# Patient Record
Sex: FEMALE | Race: WHITE | Employment: FULL TIME | URBAN - METROPOLITAN AREA
[De-identification: names, ages, dates, MRNs, and addresses within clinical notes are randomized per-mention and may not be internally consistent; named-entity substitution may affect disease eponyms.]

---

## 2017-01-04 ENCOUNTER — OFFICE VISIT (OUTPATIENT)
Dept: FAMILY MEDICINE CLINIC | Age: 57
End: 2017-01-04

## 2017-01-04 VITALS
HEART RATE: 85 BPM | RESPIRATION RATE: 17 BRPM | DIASTOLIC BLOOD PRESSURE: 76 MMHG | WEIGHT: 216 LBS | BODY MASS INDEX: 36.88 KG/M2 | SYSTOLIC BLOOD PRESSURE: 118 MMHG | HEIGHT: 64 IN | OXYGEN SATURATION: 97 %

## 2017-01-04 DIAGNOSIS — M79.604 CHRONIC PAIN OF BOTH LOWER EXTREMITIES: ICD-10-CM

## 2017-01-04 DIAGNOSIS — S52.125D CLOSED NONDISPLACED FRACTURE OF HEAD OF LEFT RADIUS WITH ROUTINE HEALING, SUBSEQUENT ENCOUNTER: Primary | ICD-10-CM

## 2017-01-04 DIAGNOSIS — G89.29 CHRONIC PAIN OF BOTH LOWER EXTREMITIES: ICD-10-CM

## 2017-01-04 DIAGNOSIS — M79.605 CHRONIC PAIN OF BOTH LOWER EXTREMITIES: ICD-10-CM

## 2017-01-04 DIAGNOSIS — F41.9 ANXIETY: ICD-10-CM

## 2017-01-04 PROCEDURE — 99213 OFFICE O/P EST LOW 20 MIN: CPT | Performed by: FAMILY MEDICINE

## 2017-01-04 RX ORDER — FLUOXETINE HYDROCHLORIDE 20 MG/1
20 CAPSULE ORAL DAILY
Qty: 30 CAPSULE | Refills: 5 | Status: SHIPPED | OUTPATIENT
Start: 2017-01-04 | End: 2017-03-20

## 2017-01-04 RX ORDER — TIZANIDINE 4 MG/1
4 TABLET ORAL NIGHTLY PRN
Qty: 30 TABLET | Refills: 0 | Status: SHIPPED | OUTPATIENT
Start: 2017-01-04 | End: 2017-02-03 | Stop reason: ALTCHOICE

## 2017-01-04 RX ORDER — ALPRAZOLAM 0.5 MG/1
0.5 TABLET ORAL NIGHTLY PRN
Qty: 30 TABLET | Refills: 0 | Status: SHIPPED | OUTPATIENT
Start: 2017-01-04 | End: 2017-02-15 | Stop reason: SDUPTHER

## 2017-01-04 RX ORDER — HYDROCODONE BITARTRATE AND ACETAMINOPHEN 7.5; 325 MG/1; MG/1
1 TABLET ORAL EVERY 6 HOURS PRN
Qty: 120 TABLET | Refills: 0 | Status: SHIPPED | OUTPATIENT
Start: 2017-01-04 | End: 2017-02-03 | Stop reason: SDUPTHER

## 2017-01-04 RX ORDER — HYDROCODONE BITARTRATE AND ACETAMINOPHEN 7.5; 325 MG/1; MG/1
TABLET ORAL
Qty: 120 TABLET | Refills: 0 | Status: SHIPPED | OUTPATIENT
Start: 2017-01-04 | End: 2017-03-20 | Stop reason: SDUPTHER

## 2017-01-24 ENCOUNTER — HOSPITAL ENCOUNTER (OUTPATIENT)
Dept: OTHER | Age: 57
Discharge: HOME | End: 2017-01-24
Attending: FAMILY MEDICINE | Admitting: FAMILY MEDICINE

## 2017-01-24 DIAGNOSIS — S52.125D CLOSED NONDISPLACED FRACTURE OF HEAD OF LEFT RADIUS WITH ROUTINE HEALING, SUBSEQUENT ENCOUNTER: ICD-10-CM

## 2017-01-30 RX ORDER — TIZANIDINE 4 MG/1
TABLET ORAL
Qty: 30 TABLET | Refills: 0 | OUTPATIENT
Start: 2017-01-30

## 2017-02-02 ENCOUNTER — TELEPHONE (OUTPATIENT)
Dept: FAMILY MEDICINE CLINIC | Age: 57
End: 2017-02-02

## 2017-02-03 ENCOUNTER — OFFICE VISIT (OUTPATIENT)
Dept: FAMILY MEDICINE CLINIC | Age: 57
End: 2017-02-03

## 2017-02-03 VITALS
WEIGHT: 204.4 LBS | TEMPERATURE: 98.3 F | DIASTOLIC BLOOD PRESSURE: 80 MMHG | HEIGHT: 64 IN | BODY MASS INDEX: 34.89 KG/M2 | SYSTOLIC BLOOD PRESSURE: 124 MMHG | HEART RATE: 92 BPM | OXYGEN SATURATION: 98 % | RESPIRATION RATE: 15 BRPM

## 2017-02-03 DIAGNOSIS — I49.9 IRREGULAR CARDIAC RHYTHM: ICD-10-CM

## 2017-02-03 DIAGNOSIS — R00.2 HEART PALPITATIONS: Primary | ICD-10-CM

## 2017-02-03 PROCEDURE — 99213 OFFICE O/P EST LOW 20 MIN: CPT | Performed by: NURSE PRACTITIONER

## 2017-02-03 PROCEDURE — 93000 ELECTROCARDIOGRAM COMPLETE: CPT | Performed by: NURSE PRACTITIONER

## 2017-02-06 ENCOUNTER — HOSPITAL ENCOUNTER (OUTPATIENT)
Dept: NON INVASIVE DIAGNOSTICS | Age: 57
Discharge: HOME | End: 2017-02-06
Attending: NURSE PRACTITIONER | Admitting: NURSE PRACTITIONER

## 2017-02-06 DIAGNOSIS — R00.2 PALPITATIONS: ICD-10-CM

## 2017-02-10 ENCOUNTER — TELEPHONE (OUTPATIENT)
Dept: FAMILY MEDICINE CLINIC | Age: 57
End: 2017-02-10

## 2017-02-10 LAB
ACQUISITION DURATION: NORMAL S
AVERAGE HEART RATE: 73 BPM
EKG DIAGNOSIS: NORMAL
FASTEST SUPRAVENTRICULAR RATE: 132 BPM
HOLTER MAX HEART RATE: 129 BPM
HOOKUP DATE: NORMAL
HOOKUP TIME: NORMAL
LONGEST SUPRAVENTRICULAR RATE: 106 BPM
Lab: NORMAL
MAX HEART RATE TIME/DATE: NORMAL
MIN HEART RATE TIME/DATE: NORMAL
MIN HEART RATE: 45 BPM
NUMBER OF FASTEST SUPRAVENTRICULAR BEATS: 5
NUMBER OF LONGEST SUPRAVENTRICULAR BEATS: 14
NUMBER OF QRS COMPLEXES: NORMAL
NUMBER OF SUPRAVENTRICULAR BEATS IN RUNS: 121
NUMBER OF SUPRAVENTRICULAR COUPLETS: 35
NUMBER OF SUPRAVENTRICULAR ECTOPICS: 289
NUMBER OF SUPRAVENTRICULAR ISOLATED BEATS: 98
NUMBER OF SUPRAVENTRICULAR RUNS: 28
NUMBER OF VENTRICULAR BEATS IN RUNS: 0
NUMBER OF VENTRICULAR BIGEMINAL CYCLES: 6155
NUMBER OF VENTRICULAR COUPLETS: 0
NUMBER OF VENTRICULAR ECTOPICS: 6262
NUMBER OF VENTRICULAR ISOLATED BEATS: 6262
NUMBER OF VENTRICULAR RUNS: 0

## 2017-02-15 ENCOUNTER — OFFICE VISIT (OUTPATIENT)
Dept: FAMILY MEDICINE CLINIC | Age: 57
End: 2017-02-15

## 2017-02-15 VITALS
HEART RATE: 50 BPM | RESPIRATION RATE: 16 BRPM | OXYGEN SATURATION: 99 % | DIASTOLIC BLOOD PRESSURE: 72 MMHG | HEIGHT: 64 IN | SYSTOLIC BLOOD PRESSURE: 112 MMHG | BODY MASS INDEX: 34.15 KG/M2 | WEIGHT: 200 LBS

## 2017-02-15 DIAGNOSIS — I49.3 PVC (PREMATURE VENTRICULAR CONTRACTION): ICD-10-CM

## 2017-02-15 DIAGNOSIS — F41.9 ANXIETY: Primary | ICD-10-CM

## 2017-02-15 PROCEDURE — 99213 OFFICE O/P EST LOW 20 MIN: CPT | Performed by: FAMILY MEDICINE

## 2017-02-15 RX ORDER — ALPRAZOLAM 0.5 MG/1
0.5 TABLET ORAL NIGHTLY PRN
Qty: 30 TABLET | Refills: 2 | Status: SHIPPED | OUTPATIENT
Start: 2017-02-15 | End: 2017-05-25 | Stop reason: SDUPTHER

## 2017-02-15 RX ORDER — VENLAFAXINE HYDROCHLORIDE 75 MG/1
CAPSULE, EXTENDED RELEASE ORAL
Qty: 30 CAPSULE | Refills: 0 | Status: SHIPPED | OUTPATIENT
Start: 2017-02-15 | End: 2017-03-02 | Stop reason: SDUPTHER

## 2017-02-15 RX ORDER — VENLAFAXINE HYDROCHLORIDE 150 MG/1
150 CAPSULE, EXTENDED RELEASE ORAL DAILY
Qty: 30 CAPSULE | Refills: 5 | Status: SHIPPED | OUTPATIENT
Start: 2017-02-15 | End: 2017-10-30 | Stop reason: SDUPTHER

## 2017-03-02 RX ORDER — VENLAFAXINE HYDROCHLORIDE 150 MG/1
CAPSULE, EXTENDED RELEASE ORAL
Qty: 30 CAPSULE | Refills: 5 | Status: SHIPPED | OUTPATIENT
Start: 2017-03-02 | End: 2017-06-19 | Stop reason: SDUPTHER

## 2017-03-20 ENCOUNTER — OFFICE VISIT (OUTPATIENT)
Dept: FAMILY MEDICINE CLINIC | Age: 57
End: 2017-03-20

## 2017-03-20 VITALS
WEIGHT: 191 LBS | RESPIRATION RATE: 14 BRPM | OXYGEN SATURATION: 98 % | HEART RATE: 90 BPM | SYSTOLIC BLOOD PRESSURE: 120 MMHG | HEIGHT: 64 IN | BODY MASS INDEX: 32.61 KG/M2 | DIASTOLIC BLOOD PRESSURE: 68 MMHG

## 2017-03-20 DIAGNOSIS — G89.29 CHRONIC PAIN OF BOTH LOWER EXTREMITIES: Primary | ICD-10-CM

## 2017-03-20 DIAGNOSIS — K59.03 DRUG-INDUCED CONSTIPATION: ICD-10-CM

## 2017-03-20 DIAGNOSIS — Z79.899 LONG TERM USE OF DRUG: ICD-10-CM

## 2017-03-20 DIAGNOSIS — I49.3 PVC (PREMATURE VENTRICULAR CONTRACTION): ICD-10-CM

## 2017-03-20 DIAGNOSIS — R73.03 PREDIABETES: ICD-10-CM

## 2017-03-20 DIAGNOSIS — R73.9 HYPERGLYCEMIA: ICD-10-CM

## 2017-03-20 DIAGNOSIS — Z12.11 COLON CANCER SCREENING: ICD-10-CM

## 2017-03-20 DIAGNOSIS — M79.605 CHRONIC PAIN OF BOTH LOWER EXTREMITIES: Primary | ICD-10-CM

## 2017-03-20 DIAGNOSIS — F41.9 ANXIETY: ICD-10-CM

## 2017-03-20 DIAGNOSIS — M79.604 CHRONIC PAIN OF BOTH LOWER EXTREMITIES: Primary | ICD-10-CM

## 2017-03-20 LAB
AMPHETAMINE SCREEN, URINE: NORMAL
BARBITURATE SCREEN, URINE: NORMAL
BENZODIAZEPINE SCREEN, URINE: NORMAL
COCAINE METABOLITE SCREEN URINE: NORMAL
HBA1C MFR BLD: 6 %
MDMA URINE: NORMAL
METHADONE SCREEN, URINE: NORMAL
METHAMPHETAMINE, URINE: NORMAL
OPIATE SCREEN URINE: NORMAL
OXYCODONE SCREEN URINE: NORMAL
PHENCYCLIDINE SCREEN URINE: NORMAL
PROPOXYPHENE SCREEN, URINE: NORMAL
THC: NORMAL
TRICYCLIC ANTIDEPRESSANTS, UR: NORMAL

## 2017-03-20 PROCEDURE — 83036 HEMOGLOBIN GLYCOSYLATED A1C: CPT | Performed by: FAMILY MEDICINE

## 2017-03-20 PROCEDURE — 99214 OFFICE O/P EST MOD 30 MIN: CPT | Performed by: FAMILY MEDICINE

## 2017-03-20 PROCEDURE — 80305 DRUG TEST PRSMV DIR OPT OBS: CPT | Performed by: FAMILY MEDICINE

## 2017-03-20 RX ORDER — HYDROCODONE BITARTRATE AND ACETAMINOPHEN 7.5; 325 MG/1; MG/1
TABLET ORAL
Qty: 90 TABLET | Refills: 0 | Status: SHIPPED | OUTPATIENT
Start: 2017-03-20 | End: 2017-04-19 | Stop reason: SDUPTHER

## 2017-04-19 ENCOUNTER — PATIENT MESSAGE (OUTPATIENT)
Dept: FAMILY MEDICINE CLINIC | Age: 57
End: 2017-04-19

## 2017-04-19 RX ORDER — HYDROCODONE BITARTRATE AND ACETAMINOPHEN 7.5; 325 MG/1; MG/1
TABLET ORAL
Qty: 90 TABLET | Refills: 0 | Status: SHIPPED | OUTPATIENT
Start: 2017-04-19 | End: 2017-05-16 | Stop reason: SDUPTHER

## 2017-05-15 RX ORDER — HYDROCODONE BITARTRATE AND ACETAMINOPHEN 7.5; 325 MG/1; MG/1
TABLET ORAL
Qty: 90 TABLET | Refills: 0 | Status: CANCELLED | OUTPATIENT
Start: 2017-05-15

## 2017-05-16 ENCOUNTER — PATIENT MESSAGE (OUTPATIENT)
Dept: FAMILY MEDICINE CLINIC | Age: 57
End: 2017-05-16

## 2017-05-16 RX ORDER — HYDROCODONE BITARTRATE AND ACETAMINOPHEN 7.5; 325 MG/1; MG/1
TABLET ORAL
Qty: 90 TABLET | Refills: 0 | Status: SHIPPED | OUTPATIENT
Start: 2017-05-16 | End: 2017-05-16 | Stop reason: SDUPTHER

## 2017-05-16 RX ORDER — HYDROCODONE BITARTRATE AND ACETAMINOPHEN 7.5; 325 MG/1; MG/1
TABLET ORAL
Qty: 90 TABLET | Refills: 0 | Status: SHIPPED | OUTPATIENT
Start: 2017-05-16 | End: 2017-06-19 | Stop reason: SDUPTHER

## 2017-05-25 RX ORDER — ALPRAZOLAM 0.5 MG/1
TABLET ORAL
Qty: 30 TABLET | Refills: 1 | Status: SHIPPED | OUTPATIENT
Start: 2017-05-25 | End: 2017-07-31 | Stop reason: SDUPTHER

## 2017-06-19 ENCOUNTER — OFFICE VISIT (OUTPATIENT)
Dept: FAMILY MEDICINE CLINIC | Age: 57
End: 2017-06-19

## 2017-06-19 VITALS
BODY MASS INDEX: 29.53 KG/M2 | SYSTOLIC BLOOD PRESSURE: 118 MMHG | WEIGHT: 173 LBS | OXYGEN SATURATION: 98 % | RESPIRATION RATE: 10 BRPM | HEART RATE: 54 BPM | DIASTOLIC BLOOD PRESSURE: 74 MMHG | HEIGHT: 64 IN

## 2017-06-19 DIAGNOSIS — F32.5 MAJOR DEPRESSION IN REMISSION (HCC): ICD-10-CM

## 2017-06-19 DIAGNOSIS — G89.29 CHRONIC PAIN OF BOTH LOWER EXTREMITIES: ICD-10-CM

## 2017-06-19 DIAGNOSIS — R00.2 PALPITATIONS: ICD-10-CM

## 2017-06-19 DIAGNOSIS — M25.522 LEFT ELBOW PAIN: Primary | ICD-10-CM

## 2017-06-19 DIAGNOSIS — M79.605 CHRONIC PAIN OF BOTH LOWER EXTREMITIES: ICD-10-CM

## 2017-06-19 DIAGNOSIS — M79.604 CHRONIC PAIN OF BOTH LOWER EXTREMITIES: ICD-10-CM

## 2017-06-19 DIAGNOSIS — R73.9 HYPERGLYCEMIA: ICD-10-CM

## 2017-06-19 LAB — HBA1C MFR BLD: 5.9 %

## 2017-06-19 PROCEDURE — 83036 HEMOGLOBIN GLYCOSYLATED A1C: CPT | Performed by: FAMILY MEDICINE

## 2017-06-19 PROCEDURE — 99213 OFFICE O/P EST LOW 20 MIN: CPT | Performed by: FAMILY MEDICINE

## 2017-06-19 RX ORDER — VENLAFAXINE HYDROCHLORIDE 150 MG/1
CAPSULE, EXTENDED RELEASE ORAL
Qty: 30 CAPSULE | Refills: 5 | Status: SHIPPED | OUTPATIENT
Start: 2017-06-19 | End: 2017-10-30 | Stop reason: ALTCHOICE

## 2017-06-19 RX ORDER — HYDROCODONE BITARTRATE AND ACETAMINOPHEN 7.5; 325 MG/1; MG/1
TABLET ORAL
Qty: 270 TABLET | Refills: 0 | Status: SHIPPED | OUTPATIENT
Start: 2017-06-19 | End: 2017-07-17 | Stop reason: SDUPTHER

## 2017-06-19 ASSESSMENT — PATIENT HEALTH QUESTIONNAIRE - PHQ9
1. LITTLE INTEREST OR PLEASURE IN DOING THINGS: 0
2. FEELING DOWN, DEPRESSED OR HOPELESS: 0
SUM OF ALL RESPONSES TO PHQ QUESTIONS 1-9: 0
SUM OF ALL RESPONSES TO PHQ9 QUESTIONS 1 & 2: 0

## 2017-07-05 ENCOUNTER — OFFICE VISIT (OUTPATIENT)
Dept: ORTHOPEDIC SURGERY | Age: 57
End: 2017-07-05

## 2017-07-05 VITALS
HEIGHT: 63 IN | HEART RATE: 55 BPM | DIASTOLIC BLOOD PRESSURE: 78 MMHG | WEIGHT: 170 LBS | SYSTOLIC BLOOD PRESSURE: 125 MMHG | BODY MASS INDEX: 30.12 KG/M2

## 2017-07-05 DIAGNOSIS — S52.125S CLOSED NONDISPLACED FRACTURE OF HEAD OF LEFT RADIUS, SEQUELA: Primary | ICD-10-CM

## 2017-07-05 PROCEDURE — 73080 X-RAY EXAM OF ELBOW: CPT | Performed by: ORTHOPAEDIC SURGERY

## 2017-07-05 PROCEDURE — 99243 OFF/OP CNSLTJ NEW/EST LOW 30: CPT | Performed by: ORTHOPAEDIC SURGERY

## 2017-07-17 ENCOUNTER — TELEPHONE (OUTPATIENT)
Dept: FAMILY MEDICINE CLINIC | Age: 57
End: 2017-07-17

## 2017-07-17 RX ORDER — HYDROCODONE BITARTRATE AND ACETAMINOPHEN 7.5; 325 MG/1; MG/1
TABLET ORAL
Qty: 270 TABLET | Refills: 0 | Status: CANCELLED | OUTPATIENT
Start: 2017-07-17

## 2017-07-17 RX ORDER — HYDROCODONE BITARTRATE AND ACETAMINOPHEN 7.5; 325 MG/1; MG/1
TABLET ORAL
Qty: 90 TABLET | Refills: 0 | Status: SHIPPED | OUTPATIENT
Start: 2017-07-17 | End: 2017-08-16 | Stop reason: SDUPTHER

## 2017-08-16 ENCOUNTER — PATIENT MESSAGE (OUTPATIENT)
Dept: FAMILY MEDICINE CLINIC | Age: 57
End: 2017-08-16

## 2017-08-16 RX ORDER — HYDROCODONE BITARTRATE AND ACETAMINOPHEN 7.5; 325 MG/1; MG/1
TABLET ORAL
Qty: 90 TABLET | Refills: 0 | Status: SHIPPED | OUTPATIENT
Start: 2017-08-16 | End: 2017-09-14 | Stop reason: SDUPTHER

## 2017-08-16 RX ORDER — ATORVASTATIN CALCIUM 40 MG/1
TABLET, FILM COATED ORAL
Qty: 30 TABLET | Refills: 11 | Status: SHIPPED | OUTPATIENT
Start: 2017-08-16 | End: 2017-10-30 | Stop reason: SDUPTHER

## 2017-08-16 RX ORDER — HYDROCODONE BITARTRATE AND ACETAMINOPHEN 7.5; 325 MG/1; MG/1
TABLET ORAL
Qty: 90 TABLET | Refills: 0 | OUTPATIENT
Start: 2017-08-16

## 2017-09-14 RX ORDER — HYDROCODONE BITARTRATE AND ACETAMINOPHEN 7.5; 325 MG/1; MG/1
TABLET ORAL
Qty: 90 TABLET | Refills: 0 | Status: SHIPPED | OUTPATIENT
Start: 2017-09-14 | End: 2017-10-13 | Stop reason: SDUPTHER

## 2017-10-12 ENCOUNTER — PATIENT MESSAGE (OUTPATIENT)
Dept: FAMILY MEDICINE CLINIC | Age: 57
End: 2017-10-12

## 2017-10-12 RX ORDER — HYDROCODONE BITARTRATE AND ACETAMINOPHEN 7.5; 325 MG/1; MG/1
TABLET ORAL
Qty: 90 TABLET | Refills: 0 | Status: CANCELLED | OUTPATIENT
Start: 2017-10-12

## 2017-10-12 RX ORDER — HYDROCODONE BITARTRATE AND ACETAMINOPHEN 7.5; 325 MG/1; MG/1
TABLET ORAL
Qty: 90 TABLET | Refills: 0 | OUTPATIENT
Start: 2017-10-12

## 2017-10-13 RX ORDER — HYDROCODONE BITARTRATE AND ACETAMINOPHEN 7.5; 325 MG/1; MG/1
TABLET ORAL
Qty: 90 TABLET | Refills: 0 | Status: SHIPPED | OUTPATIENT
Start: 2017-10-13 | End: 2017-10-31 | Stop reason: SDUPTHER

## 2017-10-13 NOTE — TELEPHONE ENCOUNTER
From: Michoacano Lazaro  To: Ludy Friend MD  Sent: 10/12/2017 7:50 PM EDT  Subject: Prescription Question    I have a appointment for October 30 for my physical and need a refill on my pain medication before my appointment.

## 2017-10-13 NOTE — TELEPHONE ENCOUNTER
Pt sent in two request for the same medication. One was denied and the other is still pending. Are we going to fill this for pt? .  Please call her

## 2017-10-30 ENCOUNTER — HOSPITAL ENCOUNTER (OUTPATIENT)
Dept: WOMENS IMAGING | Age: 57
Discharge: HOME | End: 2017-10-30
Attending: FAMILY MEDICINE | Admitting: FAMILY MEDICINE

## 2017-10-30 ENCOUNTER — OFFICE VISIT (OUTPATIENT)
Dept: FAMILY MEDICINE CLINIC | Age: 57
End: 2017-10-30

## 2017-10-30 VITALS
HEIGHT: 63 IN | BODY MASS INDEX: 30.12 KG/M2 | RESPIRATION RATE: 16 BRPM | TEMPERATURE: 98 F | DIASTOLIC BLOOD PRESSURE: 70 MMHG | WEIGHT: 170 LBS | HEART RATE: 56 BPM | SYSTOLIC BLOOD PRESSURE: 118 MMHG

## 2017-10-30 DIAGNOSIS — Z12.11 COLON CANCER SCREENING: ICD-10-CM

## 2017-10-30 DIAGNOSIS — M79.604 PAIN IN BOTH LOWER EXTREMITIES: ICD-10-CM

## 2017-10-30 DIAGNOSIS — M79.605 PAIN IN BOTH LOWER EXTREMITIES: ICD-10-CM

## 2017-10-30 DIAGNOSIS — E78.00 PURE HYPERCHOLESTEROLEMIA: ICD-10-CM

## 2017-10-30 DIAGNOSIS — Z23 NEED FOR INFLUENZA VACCINATION: ICD-10-CM

## 2017-10-30 DIAGNOSIS — E11.9 TYPE 2 DIABETES MELLITUS WITHOUT COMPLICATION, WITHOUT LONG-TERM CURRENT USE OF INSULIN (HCC): ICD-10-CM

## 2017-10-30 DIAGNOSIS — Z00.00 WELLNESS EXAMINATION: Primary | ICD-10-CM

## 2017-10-30 DIAGNOSIS — Z12.31 VISIT FOR SCREENING MAMMOGRAM: ICD-10-CM

## 2017-10-30 DIAGNOSIS — R39.9 UTI SYMPTOMS: ICD-10-CM

## 2017-10-30 DIAGNOSIS — F33.0 MILD EPISODE OF RECURRENT MAJOR DEPRESSIVE DISORDER (HCC): ICD-10-CM

## 2017-10-30 DIAGNOSIS — Z23 NEED FOR TDAP VACCINATION: ICD-10-CM

## 2017-10-30 LAB
A/G RATIO: 2 (ref 1.1–2.2)
ALBUMIN SERPL-MCNC: 4.3 G/DL (ref 3.4–5)
ALP BLD-CCNC: 65 U/L (ref 40–129)
ALT SERPL-CCNC: 39 U/L (ref 10–40)
ANION GAP SERPL CALCULATED.3IONS-SCNC: 12 MMOL/L (ref 3–16)
AST SERPL-CCNC: 21 U/L (ref 15–37)
BILIRUB SERPL-MCNC: 0.3 MG/DL (ref 0–1)
BILIRUBIN, POC: ABNORMAL
BLOOD URINE, POC: ABNORMAL
BUN BLDV-MCNC: 17 MG/DL (ref 7–20)
CALCIUM SERPL-MCNC: 9.5 MG/DL (ref 8.3–10.6)
CHLORIDE BLD-SCNC: 102 MMOL/L (ref 99–110)
CHOLESTEROL, TOTAL: 147 MG/DL (ref 0–199)
CLARITY, POC: CLEAR
CO2: 31 MMOL/L (ref 21–32)
COLOR, POC: YELLOW
CREAT SERPL-MCNC: 0.6 MG/DL (ref 0.6–1.1)
CREATININE URINE: 212.7 MG/DL (ref 28–259)
GFR AFRICAN AMERICAN: >60
GFR NON-AFRICAN AMERICAN: >60
GLOBULIN: 2.2 G/DL
GLUCOSE BLD-MCNC: 100 MG/DL (ref 70–99)
GLUCOSE URINE, POC: ABNORMAL
HBA1C MFR BLD: 5.7 %
HDLC SERPL-MCNC: 54 MG/DL (ref 40–60)
KETONES, POC: ABNORMAL
LDL CHOLESTEROL CALCULATED: 82 MG/DL
LEUKOCYTE EST, POC: ABNORMAL
MICROALBUMIN UR-MCNC: <1.2 MG/DL
MICROALBUMIN/CREAT UR-RTO: NORMAL MG/G (ref 0–30)
NITRITE, POC: ABNORMAL
PH, POC: 7
POTASSIUM SERPL-SCNC: 4.6 MMOL/L (ref 3.5–5.1)
PROTEIN, POC: ABNORMAL
SODIUM BLD-SCNC: 145 MMOL/L (ref 136–145)
SPECIFIC GRAVITY, POC: 1.02
TOTAL PROTEIN: 6.5 G/DL (ref 6.4–8.2)
TRIGL SERPL-MCNC: 53 MG/DL (ref 0–150)
UROBILINOGEN, POC: 0.2
VITAMIN D 25-HYDROXY: 25.5 NG/ML
VLDLC SERPL CALC-MCNC: 11 MG/DL

## 2017-10-30 PROCEDURE — 99396 PREV VISIT EST AGE 40-64: CPT | Performed by: REGISTERED NURSE

## 2017-10-30 PROCEDURE — 90630 INFLUENZA, QUADV, 18-64 YRS, ID, PF, MICRO INJ, 0.1ML (FLUZONE QUADV, PF): CPT | Performed by: REGISTERED NURSE

## 2017-10-30 PROCEDURE — 36415 COLL VENOUS BLD VENIPUNCTURE: CPT | Performed by: REGISTERED NURSE

## 2017-10-30 PROCEDURE — 83036 HEMOGLOBIN GLYCOSYLATED A1C: CPT | Performed by: REGISTERED NURSE

## 2017-10-30 PROCEDURE — 90715 TDAP VACCINE 7 YRS/> IM: CPT | Performed by: REGISTERED NURSE

## 2017-10-30 PROCEDURE — 90472 IMMUNIZATION ADMIN EACH ADD: CPT | Performed by: REGISTERED NURSE

## 2017-10-30 PROCEDURE — 81002 URINALYSIS NONAUTO W/O SCOPE: CPT | Performed by: REGISTERED NURSE

## 2017-10-30 PROCEDURE — 90471 IMMUNIZATION ADMIN: CPT | Performed by: REGISTERED NURSE

## 2017-10-30 RX ORDER — HYDROCODONE BITARTRATE AND ACETAMINOPHEN 7.5; 325 MG/1; MG/1
TABLET ORAL
Qty: 90 TABLET | Refills: 0 | Status: CANCELLED | OUTPATIENT
Start: 2017-10-30

## 2017-10-30 RX ORDER — NITROFURANTOIN 25; 75 MG/1; MG/1
100 CAPSULE ORAL 2 TIMES DAILY
Qty: 14 CAPSULE | Refills: 0 | Status: SHIPPED | OUTPATIENT
Start: 2017-10-30 | End: 2017-11-06

## 2017-10-30 RX ORDER — ATORVASTATIN CALCIUM 40 MG/1
TABLET, FILM COATED ORAL
Qty: 30 TABLET | Refills: 11 | Status: SHIPPED | OUTPATIENT
Start: 2017-10-30 | End: 2019-01-23 | Stop reason: SDUPTHER

## 2017-10-30 RX ORDER — VENLAFAXINE HYDROCHLORIDE 150 MG/1
150 CAPSULE, EXTENDED RELEASE ORAL DAILY
Qty: 30 CAPSULE | Refills: 5 | Status: SHIPPED | OUTPATIENT
Start: 2017-10-30 | End: 2018-02-12 | Stop reason: SDUPTHER

## 2017-10-30 NOTE — LETTER
MEDICATION AGREEMENT     Olamide Woo  [birth year]      For certain conditions, multiple classes of medications may be used to help better manage your symptoms, and to improve your ability to function at home, work and in social settings. However, these medications do have risks, which will be discussed with you, including addiction and dependency. The following prescribed medications need frequent monitoring and will require you to partner and assist in your healthcare. Medication  Dose, instructions and quantity as indicated on current prescription bottle Diagnosis/Reason(s) for Taking Category   NORCO 7.5-325MG Q8H PRN #90   PAIN     XANAX 0.5 MG NIGHTLY PRN #30   ANXIETY                      Benefits and goals of Controlled Substance Medications: There are two potential goals for your treatment: (1) decreased pain and suffering (2) improved daily life functions. There are many possible treatments for your chronic condition(s), and, in addition to controlled substance medications, we will try alternatives such as physical therapy, yoga, massage, home daily exercise, meditation, relaxation techniques, injections, chiropractic manipulations, surgery, cognitive therapy, hypnosis and many medications that are not habit-forming. Use of controlled substance medications may be helpful, but they are unlikely to resolve all of your symptoms or restore all function. Risks of Controlled Substance Medications:    Opioid pain medications: These medications can lead to problems such as addiction/dependence, sedation, lightheadedness/dizziness, memory issues, falls, constipation, nausea, or vomiting. They may also impair the ability to drive or operate machinery. Additionally, these medications may lower testosterone levels, leading to loss of bone strength, stamina and sex drive.   They may cause problems with breathing, sleep apnea and reduced coughing, which are especially dangerous for patients with lung which may also result in my being prevented from receiving further care from this office. · Other:____________________________________________________________________    AGREEMENT:    I have read the above and have had all of my questions answered. For chronic disease management, I know that my symptoms can be managed with many types of treatments. A chronic medication trial may be part of my treatment, but I must be an active participant in my care. Medication therapy is only one part of my symptom management plan. In some cases, there may be limited scientific evidence to support the chronic use of certain medications to improve symptoms and daily function. Furthermore, in certain circumstances, there may be scientific information that suggests that use of chronic controlled substances may actually worsen my symptoms and increase my risk of unintentional death directly related to this medication therapy. I know that if my provider feels my risk from controlled medications is greater than my benefit, I will have my controlled substance medication(s) compassionately lowered or removed altogether. I agree to a controlled substance medication trial.      I further agree to allow this office to contact family or friends if there are concerns about my safety and use of the controlled medications. I have agreed to use the following medications above as instructed by my physician and as stated in this [id number].      Patient Signature:  ______________________  Date:10/30/2017 or _____________    Provider Signature:______________________  Date:10/30/2017 or _____________

## 2017-10-30 NOTE — PROGRESS NOTES
Otilio Sprague  YOB: 1960    Date of Service:  10/30/2017    Chief Complaint:   Otilio Sprague is a 62 y.o. female who presents for complete physical examination. HPI: Patient presents for routine physical with fasting labs. Patient is also requesting refill on pain medication. She is not due to refill until 11/14/17. Patient also having complaints of urinary frequency and urgency. Has been going on for awhile. She would like a UA checked. Denies fever, chills, blood in urine, vaginal discharge, and flank pain.     Wt Readings from Last 3 Encounters:   10/30/17 170 lb (77.1 kg)   07/05/17 170 lb (77.1 kg)   06/19/17 173 lb (78.5 kg)     BP Readings from Last 3 Encounters:   10/30/17 118/70   07/05/17 125/78   06/19/17 118/74       Patient Active Problem List   Diagnosis    Lumbosacral strain    Urinary frequency    Phlebitis and thrombophlebitis of superficial vessels of lower extremities    Leg pain    Preoperative examination    Peritonsillar abscess    Insomnia    Palpitations    Other specified abnormal findings of blood chemistry    Other and unspecified ovarian cyst    Depression    Plantar fasciitis    Encounter for long-term (current) use of other medications    Pure hypercholesterolemia    Diabetes mellitus, type 2 ([facility] [address]    CTS (carpal tunnel syndrome)       Preventive Care:  Health Maintenance   Topic Date Due    Hepatitis C screen  1960    HIV screen  05/11/1975    Colon cancer screen colonoscopy  05/11/2010    Diabetic retinal exam  06/18/2015    Diabetic microalbuminuria test  07/25/2017    Lipid screen  10/28/2017    Diabetic hemoglobin A1C test  06/19/2018    Breast cancer screen  10/13/2018    Diabetic foot exam  10/30/2018    DTaP/Tdap/Td vaccine (2 - Td) 10/30/2027    Flu vaccine  Completed    Pneumococcal med risk  Completed        Hx abnormal PAP: no  Sexual activity: none   Self-breast exams: yes  Last eye exam: 2015, abnormal  corrective lenses  Exercise: none  Seatbelt use: yes  Calcium/vitamin D supplement: none  Tetanus: Due  Lipid panel:   Lab Results   Component Value Date    TRIG 100 10/28/2016    HDL 56 10/28/2016    HDL 57 10/04/2011    LDLCALC 94 10/28/2016      Living will:  no,   patient declined    Immunization History   Administered Date(s) Administered    Influenza Virus Vaccine 10/20/2008, 09/21/2009, 10/12/2010, 10/04/2011, 10/07/2014    Influenza Whole 10/12/2010    Influenza, Intradermal, Preservative free 10/12/2012, 09/16/2013    Influenza, Intradermal, Quadrivalent, Preservative Free 10/30/2017    Influenza, Quadv, 3 Years and older, IM 10/28/2016    Pneumococcal Polysaccharide ([id number]) 12/20/2013    Td 11/01/2007    Tdap (Boostrix, Adacel) 10/30/2017    Zoster 01/01/2014       Allergies   Allergen Reactions    Sulfa Antibiotics      Current Outpatient Prescriptions   Medication Sig Dispense Refill    atorvastatin (LIPITOR) 40 MG tablet TAKE ONE TABLET BY MOUTH EVERY DAY 30 tablet 11    venlafaxine (EFFEXOR XR) 150 MG extended release capsule Take 1 capsule by mouth daily 30 capsule 5    nitrofurantoin, macrocrystal-monohydrate, (MACROBID) 100 MG capsule Take 1 capsule by mouth 2 times daily for 7 days 14 capsule 0    HYDROcodone-acetaminophen (NORCO) 7.5-325 MG per tablet TAKE ONE TABLET BY MOUTH EVERY 8 HOURS AS NEEDED. 90 tablet 0    ALPRAZolam (XANAX) 0.5 MG tablet TAKE ONE TABLET BY MOUTH ONCE NIGHTLY AS NEEDED FOR SLEEP OR ANXIETY 30 tablet 2    Multiple Vitamins-Minerals (MULTIVITAMIN ADULT PO) Take 1 tablet by mouth daily       No current facility-administered medications for this visit.         Past Medical History:   Diagnosis Date    ADD (attention deficit disorder)     Cervical dysplasia     Chronic leg pain     Depression     Diabetes mellitus, type 2 9/16/2013    Fibrocystic breast     Genital herpes 1994    Hemorrhoid     Hyperlipidemia     Metabolic syndrome     Mood disorder (HCC)  MRSA cellulitis 2007    facial    Obesity     Panic attacks     Plantar fasciitis     Recurrent UTI     Urethral stenosis     Urinary incontinence, mixed      Past Surgical History:   Procedure Laterality Date    CARPAL TUNNEL RELEASE Right 9/26/13    CHOLECYSTECTOMY      HYSTERECTOMY, TOTAL ABDOMINAL  10/2006    LABIAL ADHESION LYSIS  10/2006    OTHER SURGICAL HISTORY      No problems with anesthesia, no problems with healing, no problems with blood clots after surgery.  OTHER SURGICAL HISTORY  5/95    lipoma removal under arm    PLANTAR FASCIA SURGERY  October 2009    MRSA after surgery. Family History   Problem Relation Age of Onset    Diabetes Mother     Drug Abuse Mother     Psoriasis Mother     Asthma Mother    Janeece Moots COPD Mother     Other Mother      shingles; cervical dysplasia    Heart Disease Father     Diabetes Father     Other Father      kidney stones    Asthma Maternal Aunt     Diabetes Maternal Aunt     Heart Disease Maternal Aunt     Other Maternal Aunt      hep B/cirrhosis    Diabetes Other     Other Sister      1958 gallstones    Diabetes Sister     Cancer Sister      1958 hyst d/t Ca    Scoliosis Son     Other Son      hx of Cat scratch disease?  Heart Disease Son      cardiomyopathy    Asthma Son      Social History     Social History    Marital status:      Spouse name: N/A    Number of children: 3    Years of education: N/A     Occupational History          Social History Main Topics    Smoking status: Former Smoker     Packs/day: 1.00     Years: 30.00     Start date: 10/30/1974     Quit date: 10/30/2004    Smokeless tobacco: Never Used      Comment: Year started: 1974 Year Quit: 2004; Educated not to start back.     Alcohol use Yes      Comment: occasionally     Drug use: No    Sexual activity: Yes     Partners: Male     Other Topics Concern    Not on file     Social History Narrative    No narrative on file Will refill Lipitor. Rechecking lipids today. -     atorvastatin (LIPITOR) 40 MG tablet; TAKE ONE TABLET BY MOUTH EVERY DAY  -     Lipid Panel; Future  -     Lipid Panel    [facility]- needs refills. -     venlafaxine (EFFEXOR XR) 150 MG extended release capsule; Take 1 capsule by mouth daily    Colon cancer screening-Due. Will return tomorrow. -     POCT Fecal Immunochemical Test (FIT); Future    Other orders- Will  Norco Rx tomorrow when dropping off the FIT test. Too early to attempt to refill.  -     Cancel: HYDROcodone-acetaminophen (NORCO) 7.5-325 MG per tablet; TAKE ONE TABLET BY MOUTH EVERY 8 HOURS AS NEEDED. Earliest Fill Date: 10/30/17    Follow up in 3 months for controlled substance refills.

## 2017-10-31 ENCOUNTER — NURSE ONLY (OUTPATIENT)
Dept: FAMILY MEDICINE CLINIC | Age: 57
End: 2017-10-31

## 2017-10-31 DIAGNOSIS — Z12.11 COLON CANCER SCREENING: ICD-10-CM

## 2017-10-31 DIAGNOSIS — E55.9 VITAMIN D INSUFFICIENCY: Primary | ICD-10-CM

## 2017-10-31 LAB
CONTROL: YES
HEMOCCULT STL QL: NORMAL
URINE CULTURE, ROUTINE: NORMAL

## 2017-10-31 PROCEDURE — 82274 ASSAY TEST FOR BLOOD FECAL: CPT | Performed by: REGISTERED NURSE

## 2017-10-31 RX ORDER — CHOLECALCIFEROL (VITAMIN D3) 50 MCG
2000 TABLET ORAL DAILY
Qty: 30 TABLET | Refills: 5 | Status: SHIPPED | OUTPATIENT
Start: 2017-10-31 | End: 2018-10-30

## 2017-10-31 RX ORDER — HYDROCODONE BITARTRATE AND ACETAMINOPHEN 7.5; 325 MG/1; MG/1
1 TABLET ORAL EVERY 8 HOURS PRN
Qty: 90 TABLET | Refills: 0 | Status: SHIPPED | OUTPATIENT
Start: 2018-01-13 | End: 2018-02-12 | Stop reason: SDUPTHER

## 2017-10-31 RX ORDER — HYDROCODONE BITARTRATE AND ACETAMINOPHEN 7.5; 325 MG/1; MG/1
1 TABLET ORAL EVERY 8 HOURS PRN
Qty: 90 TABLET | Refills: 0 | Status: SHIPPED | OUTPATIENT
Start: 2017-11-14 | End: 2017-10-31 | Stop reason: SDUPTHER

## 2017-10-31 RX ORDER — HYDROCODONE BITARTRATE AND ACETAMINOPHEN 7.5; 325 MG/1; MG/1
1 TABLET ORAL EVERY 8 HOURS PRN
Qty: 90 TABLET | Refills: 0 | Status: SHIPPED | OUTPATIENT
Start: 2017-12-14 | End: 2017-10-31 | Stop reason: SDUPTHER

## 2017-10-31 RX ORDER — HYDROCODONE BITARTRATE AND ACETAMINOPHEN 7.5; 325 MG/1; MG/1
TABLET ORAL
Qty: 90 TABLET | Refills: 0 | Status: SHIPPED | OUTPATIENT
Start: 2017-10-31 | End: 2017-10-31 | Stop reason: SDUPTHER

## 2018-02-12 ENCOUNTER — OFFICE VISIT (OUTPATIENT)
Dept: FAMILY MEDICINE CLINIC | Age: 58
End: 2018-02-12

## 2018-02-12 VITALS
SYSTOLIC BLOOD PRESSURE: 120 MMHG | RESPIRATION RATE: 12 BRPM | DIASTOLIC BLOOD PRESSURE: 76 MMHG | HEART RATE: 74 BPM | BODY MASS INDEX: 30.12 KG/M2 | HEIGHT: 63 IN | WEIGHT: 170 LBS

## 2018-02-12 DIAGNOSIS — M79.604 PAIN IN BOTH LOWER EXTREMITIES: ICD-10-CM

## 2018-02-12 DIAGNOSIS — M79.605 PAIN IN BOTH LOWER EXTREMITIES: ICD-10-CM

## 2018-02-12 DIAGNOSIS — G89.29 CHRONIC PAIN OF RIGHT KNEE: Primary | ICD-10-CM

## 2018-02-12 DIAGNOSIS — M25.561 CHRONIC PAIN OF RIGHT KNEE: Primary | ICD-10-CM

## 2018-02-12 DIAGNOSIS — F33.0 MILD EPISODE OF RECURRENT MAJOR DEPRESSIVE DISORDER (HCC): ICD-10-CM

## 2018-02-12 DIAGNOSIS — R00.2 PALPITATIONS: ICD-10-CM

## 2018-02-12 DIAGNOSIS — E55.9 VITAMIN D DEFICIENCY: ICD-10-CM

## 2018-02-12 DIAGNOSIS — E78.00 PURE HYPERCHOLESTEROLEMIA: ICD-10-CM

## 2018-02-12 PROCEDURE — 3017F COLORECTAL CA SCREEN DOC REV: CPT | Performed by: FAMILY MEDICINE

## 2018-02-12 PROCEDURE — G8427 DOCREV CUR MEDS BY ELIG CLIN: HCPCS | Performed by: FAMILY MEDICINE

## 2018-02-12 PROCEDURE — G8484 FLU IMMUNIZE NO ADMIN: HCPCS | Performed by: FAMILY MEDICINE

## 2018-02-12 PROCEDURE — G8417 CALC BMI ABV UP PARAM F/U: HCPCS | Performed by: FAMILY MEDICINE

## 2018-02-12 PROCEDURE — 99214 OFFICE O/P EST MOD 30 MIN: CPT | Performed by: FAMILY MEDICINE

## 2018-02-12 PROCEDURE — 1036F TOBACCO NON-USER: CPT | Performed by: FAMILY MEDICINE

## 2018-02-12 PROCEDURE — 3014F SCREEN MAMMO DOC REV: CPT | Performed by: FAMILY MEDICINE

## 2018-02-12 RX ORDER — VENLAFAXINE HYDROCHLORIDE 150 MG/1
150 CAPSULE, EXTENDED RELEASE ORAL DAILY
Qty: 90 CAPSULE | Refills: 3 | Status: SHIPPED | OUTPATIENT
Start: 2018-02-12 | End: 2018-10-30 | Stop reason: SDUPTHER

## 2018-02-12 RX ORDER — HYDROCODONE BITARTRATE AND ACETAMINOPHEN 7.5; 325 MG/1; MG/1
1 TABLET ORAL EVERY 8 HOURS PRN
Qty: 90 TABLET | Refills: 0 | Status: SHIPPED | OUTPATIENT
Start: 2018-02-12 | End: 2018-05-11 | Stop reason: SDUPTHER

## 2018-02-12 RX ORDER — HYDROCODONE BITARTRATE AND ACETAMINOPHEN 7.5; 325 MG/1; MG/1
1 TABLET ORAL EVERY 8 HOURS PRN
Qty: 90 TABLET | Refills: 0 | Status: SHIPPED | OUTPATIENT
Start: 2018-03-14 | End: 2018-04-13

## 2018-02-12 RX ORDER — HYDROCODONE BITARTRATE AND ACETAMINOPHEN 7.5; 325 MG/1; MG/1
1 TABLET ORAL EVERY 8 HOURS PRN
Qty: 90 TABLET | Refills: 0 | Status: SHIPPED | OUTPATIENT
Start: 2018-02-12 | End: 2018-03-14

## 2018-05-11 ENCOUNTER — OFFICE VISIT (OUTPATIENT)
Dept: FAMILY MEDICINE CLINIC | Age: 58
End: 2018-05-11

## 2018-05-11 VITALS
SYSTOLIC BLOOD PRESSURE: 118 MMHG | WEIGHT: 174 LBS | HEART RATE: 80 BPM | RESPIRATION RATE: 16 BRPM | HEIGHT: 63 IN | BODY MASS INDEX: 30.83 KG/M2 | DIASTOLIC BLOOD PRESSURE: 74 MMHG

## 2018-05-11 DIAGNOSIS — M79.604 PAIN IN BOTH LOWER EXTREMITIES: ICD-10-CM

## 2018-05-11 DIAGNOSIS — F41.9 ANXIETY: ICD-10-CM

## 2018-05-11 DIAGNOSIS — E78.00 HYPERCHOLESTEREMIA: ICD-10-CM

## 2018-05-11 DIAGNOSIS — E55.9 VITAMIN D DEFICIENCY: ICD-10-CM

## 2018-05-11 DIAGNOSIS — G89.29 CHRONIC PAIN OF RIGHT KNEE: Primary | ICD-10-CM

## 2018-05-11 DIAGNOSIS — M79.605 PAIN IN BOTH LOWER EXTREMITIES: ICD-10-CM

## 2018-05-11 DIAGNOSIS — M25.561 CHRONIC PAIN OF RIGHT KNEE: Primary | ICD-10-CM

## 2018-05-11 PROCEDURE — G8427 DOCREV CUR MEDS BY ELIG CLIN: HCPCS | Performed by: FAMILY MEDICINE

## 2018-05-11 PROCEDURE — 3017F COLORECTAL CA SCREEN DOC REV: CPT | Performed by: FAMILY MEDICINE

## 2018-05-11 PROCEDURE — G8417 CALC BMI ABV UP PARAM F/U: HCPCS | Performed by: FAMILY MEDICINE

## 2018-05-11 PROCEDURE — 99214 OFFICE O/P EST MOD 30 MIN: CPT | Performed by: FAMILY MEDICINE

## 2018-05-11 PROCEDURE — 1036F TOBACCO NON-USER: CPT | Performed by: FAMILY MEDICINE

## 2018-05-11 RX ORDER — HYDROCODONE BITARTRATE AND ACETAMINOPHEN 7.5; 325 MG/1; MG/1
1 TABLET ORAL EVERY 6 HOURS PRN
Qty: 90 TABLET | Refills: 0 | Status: SHIPPED | OUTPATIENT
Start: 2018-05-11 | End: 2018-08-09 | Stop reason: SDUPTHER

## 2018-05-11 RX ORDER — HYDROCODONE BITARTRATE AND ACETAMINOPHEN 7.5; 325 MG/1; MG/1
1 TABLET ORAL EVERY 6 HOURS PRN
Qty: 90 TABLET | Refills: 0 | Status: SHIPPED | OUTPATIENT
Start: 2018-06-13 | End: 2018-07-13

## 2018-05-11 RX ORDER — HYDROCODONE BITARTRATE AND ACETAMINOPHEN 7.5; 325 MG/1; MG/1
1 TABLET ORAL EVERY 6 HOURS PRN
Qty: 90 TABLET | Refills: 0 | Status: SHIPPED | OUTPATIENT
Start: 2018-05-14 | End: 2018-06-13

## 2018-05-11 RX ORDER — GABAPENTIN 300 MG/1
CAPSULE ORAL
Qty: 120 CAPSULE | Refills: 2 | Status: SHIPPED | OUTPATIENT
Start: 2018-05-11 | End: 2018-08-29

## 2018-06-01 ENCOUNTER — TELEPHONE (OUTPATIENT)
Dept: FAMILY MEDICINE CLINIC | Age: 58
End: 2018-06-01

## 2018-06-01 NOTE — TELEPHONE ENCOUNTER
They have three Norco rx the ne for July says 5-14-18 for the earliest fill date. This month say 5-11-18  And 6-13-18 and then she has 5-14-18 that pt is saying is for July. The pharmacy does have them. Was the 5-14-18 a mistake?

## 2018-07-31 ENCOUNTER — TELEPHONE (OUTPATIENT)
Dept: FAMILY MEDICINE CLINIC | Age: 58
End: 2018-07-31

## 2018-08-09 ENCOUNTER — TELEPHONE (OUTPATIENT)
Dept: FAMILY MEDICINE CLINIC | Age: 58
End: 2018-08-09

## 2018-08-09 DIAGNOSIS — M25.561 CHRONIC PAIN OF RIGHT KNEE: ICD-10-CM

## 2018-08-09 DIAGNOSIS — G89.29 CHRONIC PAIN OF RIGHT KNEE: ICD-10-CM

## 2018-08-09 DIAGNOSIS — M79.605 PAIN IN BOTH LOWER EXTREMITIES: ICD-10-CM

## 2018-08-09 DIAGNOSIS — M79.604 PAIN IN BOTH LOWER EXTREMITIES: ICD-10-CM

## 2018-08-09 RX ORDER — HYDROCODONE BITARTRATE AND ACETAMINOPHEN 7.5; 325 MG/1; MG/1
1 TABLET ORAL EVERY 6 HOURS PRN
Qty: 90 TABLET | Refills: 0 | Status: SHIPPED | OUTPATIENT
Start: 2018-08-09 | End: 2018-08-29 | Stop reason: SDUPTHER

## 2018-08-29 ENCOUNTER — OFFICE VISIT (OUTPATIENT)
Dept: FAMILY MEDICINE CLINIC | Age: 58
End: 2018-08-29

## 2018-08-29 VITALS
WEIGHT: 184 LBS | HEIGHT: 63 IN | SYSTOLIC BLOOD PRESSURE: 96 MMHG | RESPIRATION RATE: 16 BRPM | DIASTOLIC BLOOD PRESSURE: 62 MMHG | HEART RATE: 70 BPM | BODY MASS INDEX: 32.6 KG/M2

## 2018-08-29 DIAGNOSIS — G89.29 CHRONIC PAIN OF RIGHT KNEE: ICD-10-CM

## 2018-08-29 DIAGNOSIS — I95.9 HYPOTENSION, UNSPECIFIED HYPOTENSION TYPE: ICD-10-CM

## 2018-08-29 DIAGNOSIS — N30.00 ACUTE CYSTITIS WITHOUT HEMATURIA: ICD-10-CM

## 2018-08-29 DIAGNOSIS — M79.604 PAIN IN BOTH LOWER EXTREMITIES: ICD-10-CM

## 2018-08-29 DIAGNOSIS — M25.561 CHRONIC PAIN OF RIGHT KNEE: ICD-10-CM

## 2018-08-29 DIAGNOSIS — M79.605 PAIN IN BOTH LOWER EXTREMITIES: ICD-10-CM

## 2018-08-29 DIAGNOSIS — N76.1 CHRONIC VAGINITIS: Primary | ICD-10-CM

## 2018-08-29 LAB
AMPHETAMINE SCREEN, URINE: NORMAL
BARBITURATE SCREEN, URINE: NORMAL
BENZODIAZEPINE SCREEN, URINE: NORMAL
BILIRUBIN, POC: ABNORMAL
BLOOD URINE, POC: ABNORMAL
BUPRENORPHINE URINE: NORMAL
CLARITY, POC: ABNORMAL
COCAINE METABOLITE SCREEN URINE: NORMAL
COLOR, POC: ABNORMAL
GABAPENTIN SCREEN, URINE: NORMAL
GLUCOSE URINE, POC: ABNORMAL
KETONES, POC: ABNORMAL
LEUKOCYTE EST, POC: ABNORMAL
METHADONE SCREEN, URINE: NORMAL
METHAMPHETAMINE, URINE: NORMAL
NITRITE, POC: ABNORMAL
OPIATE SCREEN URINE: NORMAL
OXYCODONE SCREEN URINE: NORMAL
PH, POC: 6.5
PHENCYCLIDINE SCREEN URINE: NORMAL
PROPOXYPHENE SCREEN, URINE: NORMAL
PROTEIN, POC: 30
SPECIFIC GRAVITY, POC: 1.03
THC SCREEN, URINE: NORMAL
TRICYCLIC ANTIDEPRESSANTS, UR: NORMAL
UROBILINOGEN, POC: 1

## 2018-08-29 PROCEDURE — G8417 CALC BMI ABV UP PARAM F/U: HCPCS | Performed by: FAMILY MEDICINE

## 2018-08-29 PROCEDURE — 81002 URINALYSIS NONAUTO W/O SCOPE: CPT | Performed by: FAMILY MEDICINE

## 2018-08-29 PROCEDURE — 1036F TOBACCO NON-USER: CPT | Performed by: FAMILY MEDICINE

## 2018-08-29 PROCEDURE — 99213 OFFICE O/P EST LOW 20 MIN: CPT | Performed by: FAMILY MEDICINE

## 2018-08-29 PROCEDURE — G8427 DOCREV CUR MEDS BY ELIG CLIN: HCPCS | Performed by: FAMILY MEDICINE

## 2018-08-29 PROCEDURE — 3017F COLORECTAL CA SCREEN DOC REV: CPT | Performed by: FAMILY MEDICINE

## 2018-08-29 PROCEDURE — 80305 DRUG TEST PRSMV DIR OPT OBS: CPT | Performed by: FAMILY MEDICINE

## 2018-08-29 RX ORDER — HYDROCODONE BITARTRATE AND ACETAMINOPHEN 7.5; 325 MG/1; MG/1
1 TABLET ORAL EVERY 6 HOURS PRN
Qty: 90 TABLET | Refills: 0 | Status: SHIPPED | OUTPATIENT
Start: 2018-09-01 | End: 2018-10-30 | Stop reason: SDUPTHER

## 2018-08-29 RX ORDER — HYDROCODONE BITARTRATE AND ACETAMINOPHEN 7.5; 325 MG/1; MG/1
1 TABLET ORAL EVERY 6 HOURS PRN
Qty: 90 TABLET | Refills: 0 | Status: SHIPPED | OUTPATIENT
Start: 2018-09-09 | End: 2018-08-29 | Stop reason: SDUPTHER

## 2018-08-29 RX ORDER — PREGABALIN 75 MG/1
CAPSULE ORAL
Qty: 90 CAPSULE | Refills: 0 | Status: SHIPPED | OUTPATIENT
Start: 2018-08-29 | End: 2018-10-30

## 2018-08-29 RX ORDER — HYDROCODONE BITARTRATE AND ACETAMINOPHEN 7.5; 325 MG/1; MG/1
1 TABLET ORAL EVERY 6 HOURS PRN
Qty: 90 TABLET | Refills: 0 | Status: SHIPPED | OUTPATIENT
Start: 2018-10-01 | End: 2018-08-29 | Stop reason: SDUPTHER

## 2018-08-29 RX ORDER — PREGABALIN 150 MG/1
150 CAPSULE ORAL 2 TIMES DAILY
Qty: 60 CAPSULE | Refills: 1 | Status: SHIPPED | OUTPATIENT
Start: 2018-08-29 | End: 2018-10-30

## 2018-08-29 RX ORDER — HYDROCODONE BITARTRATE AND ACETAMINOPHEN 7.5; 325 MG/1; MG/1
1 TABLET ORAL EVERY 6 HOURS PRN
Qty: 90 TABLET | Refills: 0 | Status: SHIPPED | OUTPATIENT
Start: 2018-10-09 | End: 2018-08-29 | Stop reason: SDUPTHER

## 2018-08-29 NOTE — PROGRESS NOTES
DISCONTINUED: HYDROcodone-acetaminophen (NORCO) 7.5-325 MG per tablet    DISCONTINUED: HYDROcodone-acetaminophen (NORCO) 7.5-325 MG per tablet    DISCONTINUED: HYDROcodone-acetaminophen (NORCO) 7.5-325 MG per tablet   3. Chronic pain of right knee  HYDROcodone-acetaminophen (NORCO) 7.5-325 MG per tablet    DISCONTINUED: HYDROcodone-acetaminophen (NORCO) 7.5-325 MG per tablet    DISCONTINUED: HYDROcodone-acetaminophen (NORCO) 7.5-325 MG per tablet    DISCONTINUED: HYDROcodone-acetaminophen (NORCO) 7.5-325 MG per tablet   4. Acute cystitis without hematuria  Urine Culture   5.  Hypotension, unspecified hypotension type             Plan:      D/C GABAPENTIN  Start lyrica 75 bid and work up to tid  Refill norco for 2 months  Refer gyn  terazol topically in meantime for vaginal irritation  ua okay- await culture  oarrs reviewed and results c/w rx'd meds  F/u 2 months/ prn  Tolerating meds pedro Frey MD

## 2018-08-31 LAB
ORGANISM: ABNORMAL
URINE CULTURE, ROUTINE: ABNORMAL
URINE CULTURE, ROUTINE: ABNORMAL

## 2018-08-31 RX ORDER — NITROFURANTOIN 25; 75 MG/1; MG/1
100 CAPSULE ORAL 2 TIMES DAILY
Qty: 14 CAPSULE | Refills: 0 | Status: SHIPPED | OUTPATIENT
Start: 2018-08-31 | End: 2018-09-07

## 2018-09-05 ENCOUNTER — TELEPHONE (OUTPATIENT)
Dept: ORTHOPEDIC SURGERY | Age: 58
End: 2018-09-05

## 2018-09-05 NOTE — TELEPHONE ENCOUNTER
SUBMITTED A PA VIA CMM FOR Lyrica 150MG OR CAPS  Key: [id number]  PA Case ID: [id number]   STATUS: PENDING

## 2018-09-10 NOTE — TELEPHONE ENCOUNTER
CALLED AND SPOKE TO PATIENT AND ADVISED. ABOUT NANCY APPROVAL. PATIENT ASKED THAT WE REFILL THE VAGINAL CREAM FOR HER, SHE IS STILL TRYING TO GET INTO DR. VÁSQUEZ. MED PENDED.

## 2018-09-23 ENCOUNTER — PATIENT MESSAGE (OUTPATIENT)
Dept: FAMILY MEDICINE CLINIC | Age: 58
End: 2018-09-23

## 2018-09-24 NOTE — TELEPHONE ENCOUNTER
From: William Looney  To: Petar Stark MD  Sent: 9/23/2018 1:54 PM EDT  Subject: Prescription Question    I couldn't afford the Lyrica it was 400$ that's what I would have to pay.

## 2018-09-28 ENCOUNTER — OFFICE VISIT (OUTPATIENT)
Dept: GYNECOLOGY | Age: 58
End: 2018-09-28
Payer: COMMERCIAL

## 2018-09-28 VITALS
BODY MASS INDEX: 32.85 KG/M2 | HEIGHT: 63 IN | DIASTOLIC BLOOD PRESSURE: 72 MMHG | SYSTOLIC BLOOD PRESSURE: 127 MMHG | TEMPERATURE: 98 F | WEIGHT: 185.4 LBS | HEART RATE: 60 BPM

## 2018-09-28 DIAGNOSIS — N90.5 VULVAR ATROPHY: ICD-10-CM

## 2018-09-28 DIAGNOSIS — L29.2 VULVAR ITCHING: ICD-10-CM

## 2018-09-28 DIAGNOSIS — Z78.0 MENOPAUSE: ICD-10-CM

## 2018-09-28 DIAGNOSIS — Z78.0 MENOPAUSE: Primary | ICD-10-CM

## 2018-09-28 LAB
BILIRUBIN, POC: NORMAL
BLOOD URINE, POC: NORMAL
CLARITY, POC: CLEAR
COLOR, POC: YELLOW
FOLLICLE STIMULATING HORMONE: 67.1 MIU/ML
GLUCOSE URINE, POC: NORMAL
KETONES, POC: NORMAL
LEUKOCYTE EST, POC: NORMAL
LUTEINIZING HORMONE: 38.9 MIU/ML
NITRITE, POC: NORMAL
PH, POC: 7
PROTEIN, POC: NORMAL
SPECIFIC GRAVITY, POC: 1.01
UROBILINOGEN, POC: NORMAL

## 2018-09-28 PROCEDURE — 99203 OFFICE O/P NEW LOW 30 MIN: CPT | Performed by: OBSTETRICS & GYNECOLOGY

## 2018-09-28 PROCEDURE — G8427 DOCREV CUR MEDS BY ELIG CLIN: HCPCS | Performed by: OBSTETRICS & GYNECOLOGY

## 2018-09-28 PROCEDURE — G8417 CALC BMI ABV UP PARAM F/U: HCPCS | Performed by: OBSTETRICS & GYNECOLOGY

## 2018-09-28 PROCEDURE — 3017F COLORECTAL CA SCREEN DOC REV: CPT | Performed by: OBSTETRICS & GYNECOLOGY

## 2018-09-28 PROCEDURE — 1036F TOBACCO NON-USER: CPT | Performed by: OBSTETRICS & GYNECOLOGY

## 2018-09-28 PROCEDURE — 81002 URINALYSIS NONAUTO W/O SCOPE: CPT | Performed by: OBSTETRICS & GYNECOLOGY

## 2018-09-28 RX ORDER — CLOBETASOL PROPIONATE 0.5 MG/G
OINTMENT TOPICAL
Qty: 1 TUBE | Refills: 4 | Status: SHIPPED | OUTPATIENT
Start: 2018-09-28 | End: 2020-08-26 | Stop reason: ALTCHOICE

## 2018-09-30 LAB
CANDIDA SPECIES, DNA PROBE: NORMAL
GARDNERELLA VAGINALIS, DNA PROBE: NORMAL
TRICHOMONAS VAGINALIS DNA: NORMAL

## 2018-10-01 LAB
C TRACH DNA GENITAL QL NAA+PROBE: NEGATIVE
N. GONORRHOEAE DNA: NEGATIVE

## 2018-10-30 ENCOUNTER — OFFICE VISIT (OUTPATIENT)
Dept: FAMILY MEDICINE CLINIC | Age: 58
End: 2018-10-30
Payer: COMMERCIAL

## 2018-10-30 VITALS
SYSTOLIC BLOOD PRESSURE: 100 MMHG | BODY MASS INDEX: 33.66 KG/M2 | DIASTOLIC BLOOD PRESSURE: 80 MMHG | RESPIRATION RATE: 14 BRPM | HEART RATE: 58 BPM | WEIGHT: 190 LBS | HEIGHT: 63 IN

## 2018-10-30 DIAGNOSIS — F33.0 MILD EPISODE OF RECURRENT MAJOR DEPRESSIVE DISORDER (HCC): ICD-10-CM

## 2018-10-30 DIAGNOSIS — Z12.11 COLON CANCER SCREENING: ICD-10-CM

## 2018-10-30 DIAGNOSIS — E55.9 VITAMIN D INSUFFICIENCY: ICD-10-CM

## 2018-10-30 DIAGNOSIS — G89.29 CHRONIC PAIN OF RIGHT KNEE: Primary | ICD-10-CM

## 2018-10-30 DIAGNOSIS — Z23 NEED FOR INFLUENZA VACCINATION: ICD-10-CM

## 2018-10-30 DIAGNOSIS — M79.604 PAIN IN BOTH LOWER EXTREMITIES: ICD-10-CM

## 2018-10-30 DIAGNOSIS — M25.561 CHRONIC PAIN OF RIGHT KNEE: Primary | ICD-10-CM

## 2018-10-30 DIAGNOSIS — M79.605 PAIN IN BOTH LOWER EXTREMITIES: ICD-10-CM

## 2018-10-30 DIAGNOSIS — Z00.00 WELLNESS EXAMINATION: ICD-10-CM

## 2018-10-30 LAB
A/G RATIO: 2.1 (ref 1.1–2.2)
ALBUMIN SERPL-MCNC: 4.7 G/DL (ref 3.4–5)
ALP BLD-CCNC: 76 U/L (ref 40–129)
ALT SERPL-CCNC: 18 U/L (ref 10–40)
ANION GAP SERPL CALCULATED.3IONS-SCNC: 10 MMOL/L (ref 3–16)
AST SERPL-CCNC: 19 U/L (ref 15–37)
BILIRUB SERPL-MCNC: 0.3 MG/DL (ref 0–1)
BUN BLDV-MCNC: 13 MG/DL (ref 7–20)
CALCIUM SERPL-MCNC: 9.4 MG/DL (ref 8.3–10.6)
CHLORIDE BLD-SCNC: 103 MMOL/L (ref 99–110)
CHOLESTEROL, TOTAL: 197 MG/DL (ref 0–199)
CO2: 30 MMOL/L (ref 21–32)
CREAT SERPL-MCNC: 0.6 MG/DL (ref 0.6–1.1)
GFR AFRICAN AMERICAN: >60
GFR NON-AFRICAN AMERICAN: >60
GLOBULIN: 2.2 G/DL
GLUCOSE BLD-MCNC: 90 MG/DL (ref 70–99)
HDLC SERPL-MCNC: 56 MG/DL (ref 40–60)
LDL CHOLESTEROL CALCULATED: 123 MG/DL
POTASSIUM SERPL-SCNC: 4.6 MMOL/L (ref 3.5–5.1)
SODIUM BLD-SCNC: 143 MMOL/L (ref 136–145)
TOTAL PROTEIN: 6.9 G/DL (ref 6.4–8.2)
TRIGL SERPL-MCNC: 90 MG/DL (ref 0–150)
VITAMIN D 25-HYDROXY: 38.3 NG/ML
VLDLC SERPL CALC-MCNC: 18 MG/DL

## 2018-10-30 PROCEDURE — 1036F TOBACCO NON-USER: CPT | Performed by: REGISTERED NURSE

## 2018-10-30 PROCEDURE — G8427 DOCREV CUR MEDS BY ELIG CLIN: HCPCS | Performed by: REGISTERED NURSE

## 2018-10-30 PROCEDURE — 3017F COLORECTAL CA SCREEN DOC REV: CPT | Performed by: REGISTERED NURSE

## 2018-10-30 PROCEDURE — 36415 COLL VENOUS BLD VENIPUNCTURE: CPT | Performed by: REGISTERED NURSE

## 2018-10-30 PROCEDURE — 90471 IMMUNIZATION ADMIN: CPT | Performed by: REGISTERED NURSE

## 2018-10-30 PROCEDURE — 90686 IIV4 VACC NO PRSV 0.5 ML IM: CPT | Performed by: REGISTERED NURSE

## 2018-10-30 PROCEDURE — G8417 CALC BMI ABV UP PARAM F/U: HCPCS | Performed by: REGISTERED NURSE

## 2018-10-30 PROCEDURE — 99213 OFFICE O/P EST LOW 20 MIN: CPT | Performed by: REGISTERED NURSE

## 2018-10-30 PROCEDURE — G8482 FLU IMMUNIZE ORDER/ADMIN: HCPCS | Performed by: REGISTERED NURSE

## 2018-10-30 RX ORDER — VENLAFAXINE HYDROCHLORIDE 150 MG/1
150 CAPSULE, EXTENDED RELEASE ORAL DAILY
Qty: 90 CAPSULE | Refills: 3 | Status: SHIPPED | OUTPATIENT
Start: 2018-10-30 | End: 2019-01-23 | Stop reason: SDUPTHER

## 2018-10-30 RX ORDER — HYDROCODONE BITARTRATE AND ACETAMINOPHEN 7.5; 325 MG/1; MG/1
1 TABLET ORAL EVERY 6 HOURS PRN
Qty: 90 TABLET | Refills: 0 | Status: SHIPPED | OUTPATIENT
Start: 2018-12-01 | End: 2018-10-30 | Stop reason: SDUPTHER

## 2018-10-30 RX ORDER — HYDROCODONE BITARTRATE AND ACETAMINOPHEN 7.5; 325 MG/1; MG/1
1 TABLET ORAL EVERY 6 HOURS PRN
Qty: 90 TABLET | Refills: 0 | Status: SHIPPED | OUTPATIENT
Start: 2019-01-01 | End: 2019-01-23 | Stop reason: SDUPTHER

## 2018-10-30 RX ORDER — HYDROCODONE BITARTRATE AND ACETAMINOPHEN 7.5; 325 MG/1; MG/1
1 TABLET ORAL EVERY 6 HOURS PRN
Qty: 90 TABLET | Refills: 0 | Status: SHIPPED | OUTPATIENT
Start: 2018-10-30 | End: 2018-10-30 | Stop reason: SDUPTHER

## 2018-10-30 ASSESSMENT — PATIENT HEALTH QUESTIONNAIRE - PHQ9
SUM OF ALL RESPONSES TO PHQ9 QUESTIONS 1 & 2: 1
2. FEELING DOWN, DEPRESSED OR HOPELESS: 1
1. LITTLE INTEREST OR PLEASURE IN DOING THINGS: 0
SUM OF ALL RESPONSES TO PHQ QUESTIONS 1-9: 1
SUM OF ALL RESPONSES TO PHQ QUESTIONS 1-9: 1

## 2018-10-30 NOTE — PATIENT INSTRUCTIONS
Start each exercise slowly. Ease off the exercise if you start to have pain. Your doctor or physical therapist will tell you when you can start these exercises and which ones will work best for you. Toe stretch  Toe stretch    1. Sit in a chair, and extend your affected leg so that your heel is on the floor. 2. With your hand, reach down and pull your big toe up and back. Pull toward your ankle and away from the floor. 3. Hold the position for at least 15 to 30 seconds. 4. Repeat 2 to 4 times a session, several times a day. Calf-plantar fascia stretch    1. Sit with your legs extended and knees straight. 2. Place a towel around your foot just under the toes. 3. Hold each end of the towel in each hand, with your hands above your knees. 4. Pull back with the towel so that your foot stretches toward you. 5. Hold the position for at least 15 to 30 seconds. 6. Repeat 2 to 4 times a session, up to 5 sessions a day. Floor stretch    1. Stand about 2 feet from a wall, and place your hands on the wall at about shoulder height. Or you can stand behind a chair, placing your hands on the back of it for balance. 2. Step back with the leg you want to stretch. Keep the leg straight, and press your heel into the floor with your toe turned slightly in.  3. Lean forward, and bend your other leg slightly. Feel the stretch in the Achilles tendon of your back leg. Hold for at least 15 to 30 seconds. 4. Repeat 2 to 4 times a session, up to 5 sessions a day. Stair stretch    1. Stand with the balls of both feet on the edge of a step or curb (or a medium-sized phone book). With at least one hand, hold onto something solid for balance, such as a banister or handrail. 2. Keeping your affected leg straight, slowly let that heel hang down off of the step or curb until you feel a stretch in the back of your calf and/or Achilles area. Some of your weight should still be on the other leg.   3. Hold this position for at least 15 to

## 2018-10-30 NOTE — PROGRESS NOTES
10/2006    OTHER SURGICAL HISTORY      No problems with anesthesia, no problems with healing, no problems with blood clots after surgery.  OTHER SURGICAL HISTORY  5/95    lipoma removal under arm    PLANTAR FASCIA SURGERY  October 2009    MRSA after surgery. Orders Only on 09/28/2018   Component Date Value Ref Range Status    Renetta Sanchez 09/28/2018 67.1  mIU/mL Final    Comment: Default Normal Ranges    Female                        Male  Follicular:  1.5-13.1      1.4-18.1  Midcycle:    3.4-33.4  Luteal:      1.5-9.1  Pregnant:    <0.3  Postmeno:    23.0-116.3      LH 09/28/2018 38.9  mIU/mL Final    Comment:                     Default Normal Ranges    Female                   Male  Follic:  6.2-67.8        1.5-9.3  Midcycle:  8.7-76.3  Luteal:  0.5-16.9  Pregnant:  <0.1-1.5  Postmeno:  15.9-54  Contracep:  0.7-5.6                         Age Related Normal Ranges        0 to 6 Years  Female:  Not Established  Male:    Not Established          15 Years  Female:  <0.1-6.0  Male:    <0.1-6.0          21 Years  Female:                   Male: Follic:  7.5-34.1         Not Established  Midcycle:  8.7-76.3  Luteal:  0.5-16.9  Pregnant:  <0.1-1.15  Postmeno:  15.9-54  Contracep:  0.7-5.6         79 Years  Female:                  Male: Follic:  5.5-81.8        1.5-9.3  Midcycle:  8.7-76.3  Luteal:  0.5-16.9  Pregnant:  <0.1-1.5  Postmeno:  15.9-54  Contracep:  0.7-5.6         89 Years  Female:                  Male:   Follic:  2.8-00.4        3.1-34.6  Midcycle:  8.7-76.3  Luteal:  0.5-16.9  Pregnant:  <0.1-1.5  Postmeno:  15.9-54  Contracep:  0.7-5.6               Family History   Problem Relation Age of Onset    Diabetes Mother     Drug Abuse Mother     Psoriasis Mother     Asthma Mother     COPD Mother     Other Mother         shingles; cervical dysplasia    Heart Disease Father     Diabetes Father     Other Father         kidney stones    Asthma Maternal Aunt     Diabetes Maternal Aunt     Heart

## 2018-10-31 LAB
ESTIMATED AVERAGE GLUCOSE: 122.6 MG/DL
HBA1C MFR BLD: 5.9 %

## 2018-11-14 ENCOUNTER — OFFICE VISIT (OUTPATIENT)
Dept: FAMILY MEDICINE CLINIC | Age: 58
End: 2018-11-14
Payer: COMMERCIAL

## 2018-11-14 VITALS
WEIGHT: 181 LBS | BODY MASS INDEX: 32.07 KG/M2 | DIASTOLIC BLOOD PRESSURE: 72 MMHG | SYSTOLIC BLOOD PRESSURE: 112 MMHG | HEIGHT: 63 IN

## 2018-11-14 DIAGNOSIS — R73.03 PREDIABETES: Primary | ICD-10-CM

## 2018-11-14 DIAGNOSIS — M79.605 PAIN IN BOTH LOWER EXTREMITIES: ICD-10-CM

## 2018-11-14 DIAGNOSIS — E78.00 HYPERCHOLESTEREMIA: ICD-10-CM

## 2018-11-14 DIAGNOSIS — N90.5 VULVAR ATROPHY: ICD-10-CM

## 2018-11-14 DIAGNOSIS — M79.604 PAIN IN BOTH LOWER EXTREMITIES: ICD-10-CM

## 2018-11-14 DIAGNOSIS — R00.1 BRADYCARDIA: ICD-10-CM

## 2018-11-14 PROCEDURE — 99214 OFFICE O/P EST MOD 30 MIN: CPT | Performed by: FAMILY MEDICINE

## 2018-11-14 RX ORDER — OXCARBAZEPINE 150 MG/1
TABLET, FILM COATED ORAL
Qty: 60 TABLET | Refills: 0 | Status: SHIPPED | OUTPATIENT
Start: 2018-11-14 | End: 2019-04-24 | Stop reason: ALTCHOICE

## 2018-11-20 ASSESSMENT — ENCOUNTER SYMPTOMS
SHORTNESS OF BREATH: 0
CHEST TIGHTNESS: 0
WHEEZING: 0
COUGH: 0

## 2019-01-23 ENCOUNTER — OFFICE VISIT (OUTPATIENT)
Dept: FAMILY MEDICINE CLINIC | Age: 59
End: 2019-01-23
Payer: COMMERCIAL

## 2019-01-23 VITALS
BODY MASS INDEX: 32.85 KG/M2 | DIASTOLIC BLOOD PRESSURE: 62 MMHG | HEIGHT: 63 IN | WEIGHT: 185.4 LBS | SYSTOLIC BLOOD PRESSURE: 118 MMHG | TEMPERATURE: 97.7 F | RESPIRATION RATE: 18 BRPM | HEART RATE: 58 BPM | OXYGEN SATURATION: 98 %

## 2019-01-23 DIAGNOSIS — M79.604 PAIN IN BOTH LOWER EXTREMITIES: ICD-10-CM

## 2019-01-23 DIAGNOSIS — F33.0 MILD EPISODE OF RECURRENT MAJOR DEPRESSIVE DISORDER (HCC): ICD-10-CM

## 2019-01-23 DIAGNOSIS — G89.29 CHRONIC PAIN OF RIGHT KNEE: ICD-10-CM

## 2019-01-23 DIAGNOSIS — M79.605 PAIN IN BOTH LOWER EXTREMITIES: ICD-10-CM

## 2019-01-23 DIAGNOSIS — M25.561 CHRONIC PAIN OF RIGHT KNEE: ICD-10-CM

## 2019-01-23 DIAGNOSIS — E78.00 PURE HYPERCHOLESTEROLEMIA: ICD-10-CM

## 2019-01-23 PROCEDURE — 99213 OFFICE O/P EST LOW 20 MIN: CPT | Performed by: FAMILY MEDICINE

## 2019-01-23 RX ORDER — HYDROCODONE BITARTRATE AND ACETAMINOPHEN 7.5; 325 MG/1; MG/1
1 TABLET ORAL EVERY 6 HOURS PRN
Qty: 90 TABLET | Refills: 0 | Status: SHIPPED | OUTPATIENT
Start: 2019-01-24 | End: 2019-01-23 | Stop reason: SDUPTHER

## 2019-01-23 RX ORDER — VENLAFAXINE HYDROCHLORIDE 150 MG/1
150 CAPSULE, EXTENDED RELEASE ORAL DAILY
Qty: 30 CAPSULE | Refills: 5 | Status: SHIPPED | OUTPATIENT
Start: 2019-01-23 | End: 2019-04-24 | Stop reason: SDUPTHER

## 2019-01-23 RX ORDER — HYDROCODONE BITARTRATE AND ACETAMINOPHEN 7.5; 325 MG/1; MG/1
1 TABLET ORAL EVERY 6 HOURS PRN
Qty: 90 TABLET | Refills: 0 | Status: SHIPPED | OUTPATIENT
Start: 2019-03-25 | End: 2019-04-24 | Stop reason: SDUPTHER

## 2019-01-23 RX ORDER — ATORVASTATIN CALCIUM 40 MG/1
TABLET, FILM COATED ORAL
Qty: 30 TABLET | Refills: 5 | Status: SHIPPED | OUTPATIENT
Start: 2019-01-23 | End: 2019-04-24 | Stop reason: SDUPTHER

## 2019-01-23 RX ORDER — HYDROCODONE BITARTRATE AND ACETAMINOPHEN 7.5; 325 MG/1; MG/1
1 TABLET ORAL EVERY 6 HOURS PRN
Qty: 90 TABLET | Refills: 0 | Status: SHIPPED | OUTPATIENT
Start: 2019-02-23 | End: 2019-01-23 | Stop reason: SDUPTHER

## 2019-01-23 RX ORDER — FLUCONAZOLE 150 MG/1
150 TABLET ORAL ONCE
Qty: 1 TABLET | Refills: 0 | Status: SHIPPED | OUTPATIENT
Start: 2019-01-23 | End: 2019-01-23

## 2019-02-26 ENCOUNTER — TELEPHONE (OUTPATIENT)
Dept: INTERNAL MEDICINE CLINIC | Age: 59
End: 2019-02-26

## 2019-04-24 ENCOUNTER — OFFICE VISIT (OUTPATIENT)
Dept: FAMILY MEDICINE CLINIC | Age: 59
End: 2019-04-24
Payer: COMMERCIAL

## 2019-04-24 VITALS
SYSTOLIC BLOOD PRESSURE: 104 MMHG | BODY MASS INDEX: 33.31 KG/M2 | WEIGHT: 188 LBS | HEART RATE: 70 BPM | HEIGHT: 63 IN | RESPIRATION RATE: 12 BRPM | DIASTOLIC BLOOD PRESSURE: 70 MMHG

## 2019-04-24 DIAGNOSIS — M25.561 CHRONIC PAIN OF RIGHT KNEE: ICD-10-CM

## 2019-04-24 DIAGNOSIS — G89.29 CHRONIC PAIN OF RIGHT KNEE: ICD-10-CM

## 2019-04-24 DIAGNOSIS — E78.00 PURE HYPERCHOLESTEROLEMIA: ICD-10-CM

## 2019-04-24 DIAGNOSIS — R73.03 PREDIABETES: Primary | ICD-10-CM

## 2019-04-24 DIAGNOSIS — F33.0 MILD EPISODE OF RECURRENT MAJOR DEPRESSIVE DISORDER (HCC): ICD-10-CM

## 2019-04-24 DIAGNOSIS — M79.604 PAIN IN BOTH LOWER EXTREMITIES: ICD-10-CM

## 2019-04-24 DIAGNOSIS — M79.605 PAIN IN BOTH LOWER EXTREMITIES: ICD-10-CM

## 2019-04-24 LAB — HBA1C MFR BLD: 5.7 %

## 2019-04-24 PROCEDURE — 83036 HEMOGLOBIN GLYCOSYLATED A1C: CPT | Performed by: FAMILY MEDICINE

## 2019-04-24 PROCEDURE — 99213 OFFICE O/P EST LOW 20 MIN: CPT | Performed by: FAMILY MEDICINE

## 2019-04-24 RX ORDER — HYDROCODONE BITARTRATE AND ACETAMINOPHEN 7.5; 325 MG/1; MG/1
1 TABLET ORAL EVERY 6 HOURS PRN
Qty: 90 TABLET | Refills: 0 | Status: SHIPPED | OUTPATIENT
Start: 2019-05-24 | End: 2019-04-24 | Stop reason: SDUPTHER

## 2019-04-24 RX ORDER — HYDROCODONE BITARTRATE AND ACETAMINOPHEN 7.5; 325 MG/1; MG/1
1 TABLET ORAL EVERY 6 HOURS PRN
Qty: 90 TABLET | Refills: 0 | Status: SHIPPED | OUTPATIENT
Start: 2019-04-24 | End: 2019-04-24 | Stop reason: SDUPTHER

## 2019-04-24 RX ORDER — VENLAFAXINE HYDROCHLORIDE 150 MG/1
150 CAPSULE, EXTENDED RELEASE ORAL DAILY
Qty: 90 CAPSULE | Refills: 3 | Status: SHIPPED | OUTPATIENT
Start: 2019-04-24 | End: 2019-07-25 | Stop reason: SDUPTHER

## 2019-04-24 RX ORDER — HYDROCODONE BITARTRATE AND ACETAMINOPHEN 7.5; 325 MG/1; MG/1
1 TABLET ORAL EVERY 6 HOURS PRN
Qty: 60 TABLET | Refills: 0 | Status: SHIPPED | OUTPATIENT
Start: 2019-06-23 | End: 2019-07-25 | Stop reason: SDUPTHER

## 2019-04-24 RX ORDER — ATORVASTATIN CALCIUM 40 MG/1
TABLET, FILM COATED ORAL
Qty: 90 TABLET | Refills: 1 | Status: SHIPPED | OUTPATIENT
Start: 2019-04-24 | End: 2020-01-09

## 2019-04-24 NOTE — PROGRESS NOTES
Subjective:      Patient ID: Minnie Ward is a 62 y.o. female. HPI  Chief Complaint   Patient presents with    Pain     3 MO PAIN ROUTINE FOLLOW UP OARRS TODAY Anuel Guzman AND JORGE FABIAND      Needs pain med today but interested in trying to wean down on pain med   Legs not as bad as sitting more on new job at [employer]-way to reduce overall dose  a1c today 5.7%  Taking 3-5 tabs/ day of norco depending on activity  Going up and down steps   irs job is okay  BP Readings from Last 3 Encounters:   04/24/19 104/70   01/23/19 118/62   11/14/18 112/72     Pulse Readings from Last 3 Encounters:   04/24/19 70   01/23/19 58   10/30/18 58     Wt Readings from Last 3 Encounters:   04/24/19 188 lb (85.3 kg)   01/23/19 185 lb 6.4 oz (84.1 kg)   11/14/18 181 lb (82.1 kg)     Watching carbs - limited potatoes - just not real active w/ sedentary job  Labs okay 10/18 - taking lipitor daily now as lipids slightly higher  Review of Systems  Some pain in right upper back - burning pain  O/w feeling good -   Objective:   Physical Exam   Constitutional: She is oriented to person, place, and time. She appears well-developed and well-nourished. No distress. HENT:   Head: Normocephalic and atraumatic. Eyes: Conjunctivae are normal. No scleral icterus. Cardiovascular: Normal rate and regular rhythm. Murmur heard. Pulmonary/Chest: Effort normal and breath sounds normal. No respiratory distress. She has no wheezes. She has no rales. Abdominal: Soft. Bowel sounds are normal. She exhibits no distension. There is no tenderness. Musculoskeletal: She exhibits no edema. Neurological: She is alert and oriented to person, place, and time. Skin: Skin is warm and dry. Psychiatric: She has a normal mood and affect. Assessment:       Diagnosis Orders   1. Prediabetes  POCT glycosylated hemoglobin (Hb A1C)   2. Pure hypercholesterolemia  atorvastatin (LIPITOR) 40 MG tablet   3.  Mild episode of recurrent major depressive disorder ([address] venlafaxine (EFFEXOR XR) 150 MG extended release capsule   4. Pain in both lower extremities  HYDROcodone-acetaminophen (NORCO) 7.5-325 MG per tablet    DISCONTINUED: HYDROcodone-acetaminophen (NORCO) 7.5-325 MG per tablet    DISCONTINUED: HYDROcodone-acetaminophen (NORCO) 7.5-325 MG per tablet   5. Chronic pain of right knee  HYDROcodone-acetaminophen (NORCO) 7.5-325 MG per tablet    DISCONTINUED: HYDROcodone-acetaminophen (NORCO) 7.5-325 MG per tablet    DISCONTINUED: HYDROcodone-acetaminophen (NORCO) 7.5-325 MG per tablet           Plan:      Chronic knee/ leg pain and new muscular pain upper back  Back care/ posture/ stretch/ heat/ otc analgesics prn  Refill norco w/ goal to reduce overalldose from 90 to 60/d in 3rd month  effexor working well for mood  bs stable in prediabetes range  Refill statin - stable on labs 6 months ago  F/u 3 months.         Dash Billy MD

## 2019-06-03 ENCOUNTER — TELEPHONE (OUTPATIENT)
Dept: INTERNAL MEDICINE CLINIC | Age: 59
End: 2019-06-03

## 2019-06-14 ENCOUNTER — OFFICE VISIT (OUTPATIENT)
Dept: GYNECOLOGY | Age: 59
End: 2019-06-14
Payer: COMMERCIAL

## 2019-06-14 VITALS
BODY MASS INDEX: 34.91 KG/M2 | WEIGHT: 197 LBS | HEIGHT: 63 IN | TEMPERATURE: 98 F | DIASTOLIC BLOOD PRESSURE: 66 MMHG | HEART RATE: 57 BPM | SYSTOLIC BLOOD PRESSURE: 123 MMHG

## 2019-06-14 DIAGNOSIS — Z01.419 WELL WOMAN EXAM WITH ROUTINE GYNECOLOGICAL EXAM: ICD-10-CM

## 2019-06-14 DIAGNOSIS — N90.5 VULVAR ATROPHY: Primary | ICD-10-CM

## 2019-06-14 DIAGNOSIS — N89.8 VAGINAL ODOR: ICD-10-CM

## 2019-06-14 LAB
BILIRUBIN, POC: NORMAL
BLOOD URINE, POC: NORMAL
CLARITY, POC: CLEAR
COLOR, POC: YELLOW
GLUCOSE URINE, POC: NORMAL
KETONES, POC: NORMAL
LEUKOCYTE EST, POC: NORMAL
NITRITE, POC: NORMAL
PH, POC: 7.5
PROTEIN, POC: NORMAL
SPECIFIC GRAVITY, POC: 1.01
UROBILINOGEN, POC: NORMAL

## 2019-06-14 PROCEDURE — 81002 URINALYSIS NONAUTO W/O SCOPE: CPT | Performed by: OBSTETRICS & GYNECOLOGY

## 2019-06-14 PROCEDURE — 99396 PREV VISIT EST AGE 40-64: CPT | Performed by: OBSTETRICS & GYNECOLOGY

## 2019-06-14 NOTE — LETTER
[facility]  [address] [place] [address] Drive. [address] [place] [address]  Phone: [phone]  Fax: [phone]    Dalia Darnell MD        June 27, 2019    [address] [place] [id number]      Dear Kyler Graham:    Our office has tried to contact you several times. We are writing to let you know that we were unable to get a result off of your pap smear as it showed scant cellularity. He just wants you to come in to do your pap smear again. Please give us a call to set that up [phone]. If you have any questions or concerns, please don't hesitate to call.     Sincerely,        Anne Marie VÁSQUEZ MD

## 2019-06-14 NOTE — PROGRESS NOTES
Subjective:      Patient ID: Karolyn Patel is a 61 y.o. female. HPI  pts here for annual gyn exam.  She has been using the clobetasol sporadically only when it irritates her- I advised her to use it twice daily. She also notes a vag odor but no d/c. Up to date with mammogram, declines colonoscopy for colon cancer screening. Going to [facility] for a week leaving tonight. Review of Systems Pertinent review of systems items discussed above. All others systems items not discussed above were negative. Objective:   Physical Exam   Constitutional: She is oriented to person, place, and time. She appears well-developed and well-nourished. HENT:   Head: Normocephalic and atraumatic. Neck: No tracheal deviation present. No thyromegaly present. Cardiovascular: Normal rate, regular rhythm and normal heart sounds. No murmur heard. Pulmonary/Chest: Effort normal and breath sounds normal. No respiratory distress. She has no wheezes. She has no rales. Right breast exhibits no mass, no nipple discharge and no skin change. Left breast exhibits no mass, no nipple discharge and no skin change. No breast tenderness (no masses), discharge or bleeding. Abdominal: Soft. She exhibits no distension and no mass. There is no tenderness. There is no rebound. Genitourinary: Rectum normal and vagina normal. Rectal exam shows no external hemorrhoid, no mass and guaiac negative stool. No breast tenderness (no masses), discharge or bleeding. There is no lesion on the right labia. There is no lesion on the left labia. Right adnexum displays no mass and no tenderness. Left adnexum displays no mass and no tenderness. No foreign body in the vagina. No vaginal discharge found. Genitourinary Comments: Pap performed. Uterus and cx absent. Musculoskeletal: Normal range of motion. Lymphadenopathy:     She has no cervical adenopathy. Neurological: She is alert and oriented to person, place, and time.      Dna, affirm, mycoplasma  Assessment:      Normal gyn exam, vag odor      Plan:      Call with results. F/u annual gyn exam or sooner if needed.         Francisco Dubois MD

## 2019-06-17 LAB
C TRACH DNA GENITAL QL NAA+PROBE: NEGATIVE
N. GONORRHOEAE DNA: NEGATIVE

## 2019-06-18 LAB
HPV COMMENT: NORMAL
HPV TYPE 16: NOT DETECTED
HPV TYPE 18: NOT DETECTED
HPVOH (OTHER TYPES): NOT DETECTED

## 2019-06-19 LAB
FINAL REPORT: NORMAL
PRELIMINARY: NORMAL

## 2019-06-27 ENCOUNTER — TELEPHONE (OUTPATIENT)
Dept: FAMILY MEDICINE CLINIC | Age: 59
End: 2019-06-27

## 2019-06-27 NOTE — TELEPHONE ENCOUNTER
Please return patient's call. Unclear when follow up PAP needs to be scheduled. Patient told 4 months but if this needs to be sooner please call patient and schedule.

## 2019-07-16 ENCOUNTER — OFFICE VISIT (OUTPATIENT)
Dept: GYNECOLOGY | Age: 59
End: 2019-07-16
Payer: COMMERCIAL

## 2019-07-16 VITALS
RESPIRATION RATE: 16 BRPM | OXYGEN SATURATION: 99 % | DIASTOLIC BLOOD PRESSURE: 78 MMHG | SYSTOLIC BLOOD PRESSURE: 122 MMHG | TEMPERATURE: 97.7 F | HEART RATE: 65 BPM | WEIGHT: 203.4 LBS | HEIGHT: 64 IN | BODY MASS INDEX: 34.72 KG/M2

## 2019-07-16 DIAGNOSIS — R87.615 ENCOUNTER FOR REPEAT PAP SMEAR DUE TO PREVIOUS INSUFFICIENT CERVICAL CELLS: Primary | ICD-10-CM

## 2019-07-16 PROCEDURE — 99213 OFFICE O/P EST LOW 20 MIN: CPT | Performed by: OBSTETRICS & GYNECOLOGY

## 2019-07-16 NOTE — PROGRESS NOTES
Pt's here for f/u pap. She denies complaints. Recently returned from family trip to [facility], planning to go next month to [place] to visit her son and grandkids.   Af, vss  Ext- no lesions  Meatus- no lesions  Vag- no d/c  cx- n/a  Ut- n/a  adn- nt, no masses  Pap  Assess:  H/o abnormal pap smear due to insufficient cells  Plan:  F/u pap annual gyn exam.

## 2019-07-25 ENCOUNTER — OFFICE VISIT (OUTPATIENT)
Dept: FAMILY MEDICINE CLINIC | Age: 59
End: 2019-07-25
Payer: COMMERCIAL

## 2019-07-25 VITALS
DIASTOLIC BLOOD PRESSURE: 72 MMHG | SYSTOLIC BLOOD PRESSURE: 120 MMHG | BODY MASS INDEX: 34.49 KG/M2 | HEIGHT: 64 IN | WEIGHT: 202 LBS

## 2019-07-25 DIAGNOSIS — G89.29 CHRONIC PAIN OF RIGHT KNEE: ICD-10-CM

## 2019-07-25 DIAGNOSIS — M79.604 PAIN IN BOTH LOWER EXTREMITIES: ICD-10-CM

## 2019-07-25 DIAGNOSIS — M79.605 PAIN IN BOTH LOWER EXTREMITIES: ICD-10-CM

## 2019-07-25 DIAGNOSIS — M25.561 CHRONIC PAIN OF RIGHT KNEE: ICD-10-CM

## 2019-07-25 DIAGNOSIS — E78.00 PURE HYPERCHOLESTEROLEMIA: ICD-10-CM

## 2019-07-25 DIAGNOSIS — F41.9 ANXIETY: Primary | ICD-10-CM

## 2019-07-25 DIAGNOSIS — R00.2 PALPITATIONS: ICD-10-CM

## 2019-07-25 DIAGNOSIS — F33.0 MILD EPISODE OF RECURRENT MAJOR DEPRESSIVE DISORDER (HCC): ICD-10-CM

## 2019-07-25 PROCEDURE — 99213 OFFICE O/P EST LOW 20 MIN: CPT | Performed by: FAMILY MEDICINE

## 2019-07-25 RX ORDER — VENLAFAXINE HYDROCHLORIDE 150 MG/1
150 CAPSULE, EXTENDED RELEASE ORAL DAILY
Qty: 90 CAPSULE | Refills: 3 | Status: SHIPPED | OUTPATIENT
Start: 2019-07-25 | End: 2019-10-25 | Stop reason: SDUPTHER

## 2019-07-25 RX ORDER — HYDROCODONE BITARTRATE AND ACETAMINOPHEN 7.5; 325 MG/1; MG/1
1 TABLET ORAL EVERY 6 HOURS PRN
Qty: 80 TABLET | Refills: 0 | Status: SHIPPED | OUTPATIENT
Start: 2019-09-23 | End: 2019-10-25 | Stop reason: SDUPTHER

## 2019-07-25 RX ORDER — HYDROCODONE BITARTRATE AND ACETAMINOPHEN 7.5; 325 MG/1; MG/1
1 TABLET ORAL EVERY 6 HOURS PRN
Qty: 80 TABLET | Refills: 0 | Status: SHIPPED | OUTPATIENT
Start: 2019-07-25 | End: 2019-07-25 | Stop reason: SDUPTHER

## 2019-07-25 RX ORDER — HYDROCODONE BITARTRATE AND ACETAMINOPHEN 7.5; 325 MG/1; MG/1
1 TABLET ORAL EVERY 6 HOURS PRN
Qty: 80 TABLET | Refills: 0 | Status: SHIPPED | OUTPATIENT
Start: 2019-08-24 | End: 2019-07-25 | Stop reason: SDUPTHER

## 2019-07-25 NOTE — PROGRESS NOTES
heard.  Pulmonary/Chest: Effort normal and breath sounds normal. No respiratory distress. She has no wheezes. She has no rales. Abdominal: Soft. Bowel sounds are normal. She exhibits no distension. There is no tenderness. Musculoskeletal: She exhibits no edema. Neurological: She is alert and oriented to person, place, and time. Skin: Skin is warm and dry. Psychiatric: She has a normal mood and affect. Assessment:       Diagnosis Orders   1. Anxiety     2. Pain in both lower extremities  HYDROcodone-acetaminophen (NORCO) 7.5-325 MG per tablet    DISCONTINUED: HYDROcodone-acetaminophen (NORCO) 7.5-325 MG per tablet    DISCONTINUED: HYDROcodone-acetaminophen (NORCO) 7.5-325 MG per tablet   3. Chronic pain of right knee  HYDROcodone-acetaminophen (NORCO) 7.5-325 MG per tablet    DISCONTINUED: HYDROcodone-acetaminophen (NORCO) 7.5-325 MG per tablet    DISCONTINUED: HYDROcodone-acetaminophen (NORCO) 7.5-325 MG per tablet   4. Mild episode of recurrent major depressive disorder (HCC)  venlafaxine (EFFEXOR XR) 150 MG extended release capsule   5. Palpitations     6. Pure hypercholesterolemia             Plan:      Diet/ exercise d/w pt    utd on gyn exam/ pap  mmg soon  Fit test planned soon for colon ca screening - labs on f/u visit  Suspect oa knees - encourage getting up and moving around as often as possible/ changing position  Cont effexor - mood fairly stable  Refill pain med -norco w/ goal of reducing dose gradually 80 tabs from 90/ month to stretch for 3 months  F/u 3 months PE/ labs fasting  Consider imaging knees if worsens  ?  Restless leg type pain  Marsha Jeffrey MD

## 2019-10-25 ENCOUNTER — OFFICE VISIT (OUTPATIENT)
Dept: FAMILY MEDICINE CLINIC | Age: 59
End: 2019-10-25
Payer: COMMERCIAL

## 2019-10-25 VITALS
WEIGHT: 193 LBS | SYSTOLIC BLOOD PRESSURE: 120 MMHG | HEIGHT: 64 IN | BODY MASS INDEX: 32.95 KG/M2 | DIASTOLIC BLOOD PRESSURE: 76 MMHG | HEART RATE: 74 BPM | RESPIRATION RATE: 14 BRPM

## 2019-10-25 DIAGNOSIS — R20.9 DISTURBANCE OF SKIN SENSATION: ICD-10-CM

## 2019-10-25 DIAGNOSIS — G89.29 CHRONIC PAIN OF RIGHT KNEE: ICD-10-CM

## 2019-10-25 DIAGNOSIS — E78.00 PURE HYPERCHOLESTEROLEMIA: Primary | ICD-10-CM

## 2019-10-25 DIAGNOSIS — E78.00 HYPERCHOLESTEREMIA: ICD-10-CM

## 2019-10-25 DIAGNOSIS — L91.8 SKIN TAG: ICD-10-CM

## 2019-10-25 DIAGNOSIS — M25.561 CHRONIC PAIN OF RIGHT KNEE: ICD-10-CM

## 2019-10-25 DIAGNOSIS — Z53.20 HIV SCREENING DECLINED: ICD-10-CM

## 2019-10-25 DIAGNOSIS — M79.605 PAIN IN BOTH LOWER EXTREMITIES: ICD-10-CM

## 2019-10-25 DIAGNOSIS — Z11.59 ENCOUNTER FOR HEPATITIS C SCREENING TEST FOR LOW RISK PATIENT: ICD-10-CM

## 2019-10-25 DIAGNOSIS — M79.604 PAIN IN BOTH LOWER EXTREMITIES: ICD-10-CM

## 2019-10-25 DIAGNOSIS — I49.9 IRREGULAR HEART BEAT: ICD-10-CM

## 2019-10-25 DIAGNOSIS — F33.0 MILD EPISODE OF RECURRENT MAJOR DEPRESSIVE DISORDER (HCC): ICD-10-CM

## 2019-10-25 DIAGNOSIS — L29.9 PRURITUS: ICD-10-CM

## 2019-10-25 DIAGNOSIS — Z11.4 ENCOUNTER FOR SCREENING FOR HIV: ICD-10-CM

## 2019-10-25 LAB
A/G RATIO: 2.9 (ref 1.1–2.2)
ALBUMIN SERPL-MCNC: 4.9 G/DL (ref 3.4–5)
ALP BLD-CCNC: 68 U/L (ref 40–129)
ALT SERPL-CCNC: 24 U/L (ref 10–40)
ANION GAP SERPL CALCULATED.3IONS-SCNC: 10 MMOL/L (ref 3–16)
AST SERPL-CCNC: 22 U/L (ref 15–37)
BASOPHILS ABSOLUTE: 0 K/UL (ref 0–0.2)
BASOPHILS RELATIVE PERCENT: 0.6 %
BILIRUB SERPL-MCNC: 0.3 MG/DL (ref 0–1)
BUN BLDV-MCNC: 19 MG/DL (ref 7–20)
CALCIUM SERPL-MCNC: 9.6 MG/DL (ref 8.3–10.6)
CHLORIDE BLD-SCNC: 102 MMOL/L (ref 99–110)
CHOLESTEROL, TOTAL: 161 MG/DL (ref 0–199)
CO2: 31 MMOL/L (ref 21–32)
CREAT SERPL-MCNC: 0.6 MG/DL (ref 0.6–1.1)
EOSINOPHILS ABSOLUTE: 0.2 K/UL (ref 0–0.6)
EOSINOPHILS RELATIVE PERCENT: 3.9 %
GFR AFRICAN AMERICAN: >60
GFR NON-AFRICAN AMERICAN: >60
GLOBULIN: 1.7 G/DL
GLUCOSE BLD-MCNC: 113 MG/DL (ref 70–99)
HBV SURFACE AB TITR SER: 9.79 MIU/ML
HCT VFR BLD CALC: 41.4 % (ref 36–48)
HDLC SERPL-MCNC: 44 MG/DL (ref 40–60)
HEMOGLOBIN: 13.7 G/DL (ref 12–16)
HEPATITIS B SURFACE ANTIGEN INTERPRETATION: NORMAL
HEPATITIS C ANTIBODY INTERPRETATION: NORMAL
HIV AG/AB: NORMAL
HIV ANTIGEN: NORMAL
HIV-1 ANTIBODY: NORMAL
HIV-2 AB: NORMAL
LDL CHOLESTEROL CALCULATED: 97 MG/DL
LYMPHOCYTES ABSOLUTE: 1.6 K/UL (ref 1–5.1)
LYMPHOCYTES RELATIVE PERCENT: 31 %
MCH RBC QN AUTO: 30.3 PG (ref 26–34)
MCHC RBC AUTO-ENTMCNC: 33 G/DL (ref 31–36)
MCV RBC AUTO: 91.8 FL (ref 80–100)
MONOCYTES ABSOLUTE: 0.5 K/UL (ref 0–1.3)
MONOCYTES RELATIVE PERCENT: 10.1 %
NEUTROPHILS ABSOLUTE: 2.9 K/UL (ref 1.7–7.7)
NEUTROPHILS RELATIVE PERCENT: 54.4 %
PDW BLD-RTO: 13.5 % (ref 12.4–15.4)
PLATELET # BLD: 202 K/UL (ref 135–450)
PMV BLD AUTO: 10.6 FL (ref 5–10.5)
POTASSIUM SERPL-SCNC: 4.6 MMOL/L (ref 3.5–5.1)
RBC # BLD: 4.51 M/UL (ref 4–5.2)
SODIUM BLD-SCNC: 143 MMOL/L (ref 136–145)
TOTAL PROTEIN: 6.6 G/DL (ref 6.4–8.2)
TRIGL SERPL-MCNC: 100 MG/DL (ref 0–150)
TSH REFLEX: 3.67 UIU/ML (ref 0.27–4.2)
VLDLC SERPL CALC-MCNC: 20 MG/DL
WBC # BLD: 5.2 K/UL (ref 4–11)

## 2019-10-25 PROCEDURE — 93000 ELECTROCARDIOGRAM COMPLETE: CPT | Performed by: FAMILY MEDICINE

## 2019-10-25 PROCEDURE — 11200 RMVL SKIN TAGS UP TO&INC 15: CPT | Performed by: FAMILY MEDICINE

## 2019-10-25 PROCEDURE — 99214 OFFICE O/P EST MOD 30 MIN: CPT | Performed by: FAMILY MEDICINE

## 2019-10-25 RX ORDER — HYDROCODONE BITARTRATE AND ACETAMINOPHEN 7.5; 325 MG/1; MG/1
1 TABLET ORAL EVERY 6 HOURS PRN
Qty: 70 TABLET | Refills: 0 | Status: SHIPPED | OUTPATIENT
Start: 2019-12-24 | End: 2020-01-23 | Stop reason: SDUPTHER

## 2019-10-25 RX ORDER — HYDROCODONE BITARTRATE AND ACETAMINOPHEN 7.5; 325 MG/1; MG/1
1 TABLET ORAL EVERY 6 HOURS PRN
Qty: 70 TABLET | Refills: 0 | Status: SHIPPED | OUTPATIENT
Start: 2019-10-25 | End: 2019-10-25 | Stop reason: SDUPTHER

## 2019-10-25 RX ORDER — DOXEPIN HYDROCHLORIDE 25 MG/1
25-50 CAPSULE ORAL NIGHTLY
Qty: 60 CAPSULE | Refills: 2 | Status: SHIPPED | OUTPATIENT
Start: 2019-10-25 | End: 2020-01-23

## 2019-10-25 RX ORDER — DESONIDE 0.5 MG/ML
LOTION TOPICAL
Qty: 118 ML | Refills: 2 | Status: SHIPPED | OUTPATIENT
Start: 2019-10-25 | End: 2020-08-26 | Stop reason: ALTCHOICE

## 2019-10-25 RX ORDER — HYDROCODONE BITARTRATE AND ACETAMINOPHEN 7.5; 325 MG/1; MG/1
1 TABLET ORAL EVERY 6 HOURS PRN
Qty: 70 TABLET | Refills: 0 | Status: SHIPPED | OUTPATIENT
Start: 2019-11-24 | End: 2019-10-25 | Stop reason: SDUPTHER

## 2019-10-25 RX ORDER — VENLAFAXINE HYDROCHLORIDE 150 MG/1
150 CAPSULE, EXTENDED RELEASE ORAL DAILY
Qty: 90 CAPSULE | Refills: 3 | Status: SHIPPED | OUTPATIENT
Start: 2019-10-25 | End: 2020-01-23

## 2019-10-28 ENCOUNTER — TELEPHONE (OUTPATIENT)
Dept: FAMILY MEDICINE CLINIC | Age: 59
End: 2019-10-28

## 2019-11-05 ENCOUNTER — TELEPHONE (OUTPATIENT)
Dept: FAMILY MEDICINE CLINIC | Age: 59
End: 2019-11-05

## 2020-01-09 RX ORDER — ATORVASTATIN CALCIUM 40 MG/1
TABLET, FILM COATED ORAL
Qty: 90 TABLET | Refills: 1 | Status: SHIPPED | OUTPATIENT
Start: 2020-01-09 | End: 2020-08-26 | Stop reason: SDUPTHER

## 2020-01-23 ENCOUNTER — OFFICE VISIT (OUTPATIENT)
Dept: FAMILY MEDICINE CLINIC | Age: 60
End: 2020-01-23
Payer: COMMERCIAL

## 2020-01-23 VITALS
HEART RATE: 84 BPM | HEIGHT: 64 IN | BODY MASS INDEX: 33.65 KG/M2 | WEIGHT: 197.1 LBS | SYSTOLIC BLOOD PRESSURE: 128 MMHG | DIASTOLIC BLOOD PRESSURE: 72 MMHG | RESPIRATION RATE: 14 BRPM

## 2020-01-23 LAB
BILIRUBIN, POC: ABNORMAL
BLOOD URINE, POC: ABNORMAL
CLARITY, POC: CLEAR
COLOR, POC: CLEAR
GLUCOSE URINE, POC: ABNORMAL
KETONES, POC: ABNORMAL
LEUKOCYTE EST, POC: ABNORMAL
NITRITE, POC: ABNORMAL
PH, POC: 5.5
PROTEIN, POC: 30
SPECIFIC GRAVITY, POC: 1.03
UROBILINOGEN, POC: 0.2

## 2020-01-23 PROCEDURE — 99214 OFFICE O/P EST MOD 30 MIN: CPT | Performed by: FAMILY MEDICINE

## 2020-01-23 PROCEDURE — 81002 URINALYSIS NONAUTO W/O SCOPE: CPT | Performed by: FAMILY MEDICINE

## 2020-01-23 RX ORDER — HYDROXYZINE 50 MG/1
50-100 TABLET, FILM COATED ORAL NIGHTLY
Qty: 60 TABLET | Refills: 0 | Status: SHIPPED | OUTPATIENT
Start: 2020-01-23 | End: 2020-01-27

## 2020-01-23 RX ORDER — HYDROCODONE BITARTRATE AND ACETAMINOPHEN 7.5; 325 MG/1; MG/1
1 TABLET ORAL EVERY 6 HOURS PRN
Qty: 90 TABLET | Refills: 0 | Status: SHIPPED | OUTPATIENT
Start: 2020-01-23 | End: 2020-02-25 | Stop reason: SDUPTHER

## 2020-01-23 RX ORDER — SERTRALINE HYDROCHLORIDE 100 MG/1
100 TABLET, FILM COATED ORAL DAILY
Qty: 30 TABLET | Refills: 2 | Status: SHIPPED | OUTPATIENT
Start: 2020-01-23 | End: 2020-04-20

## 2020-01-23 NOTE — PROGRESS NOTES
Dr. Margarita Cano [address], [place], [address]  Phone: [phone]    HPI:  Chief Complaint   Patient presents with    Anxiety     WANTS TO [address] NOT WANTING TO STAY ON [address]    Urinary Tract Infection     POSS UTI JUST FEELS OFF OVERALLY TIRED FREQUENCY     Insomnia     NOT SLEEPING WELL HAS JENN ON PHONE SHOWING HOW [facility] SHE IS SLEEPING         Samreen Evans is a 61 y.o. female here for evaluation of anxiety, UTI, and insomnia. Patient states that she had an echocardiogram in December 201. She reports that her cardiologist put her on metoprolol for premature atrial contractions and palpitations. She notes that she will be returning to her cardiologist in February 11, 2020. Patient states that she has felt anxious and is averaging 4 hours of sleep nightly. She notes that she has pain in the ball of her left foot that goes in between her toes. She states that she has a history of plantar fascia surgery of her left foot. Patient notes that she does not feel depressed. She reports that she took zoloft in the past that was well tolerated. She reports that ibuprofen helps the pain on the ball of her foot. She notes that she has been taking norco 7.5-235 mg daily. Patient notes that she is on the keto diet for around a week and is losing weight. She states that she does not have difficulty going to sleep but once she is awake, she cannot go back to sleep. Patient notes that doxepin 25 mg 1 to 2 capsules nightly did not help her sleep. She reports that she has urinary frequency.      Vitals:  BP Readings from Last 3 Encounters:   01/23/20 128/72   10/25/19 120/76   07/25/19 120/72       Pulse Readings from Last 3 Encounters:   01/23/20 84   10/25/19 74   07/16/19 65        Wt Readings from Last 3 Encounters:   01/23/20 197 lb 1.6 oz (89.4 kg)   10/25/19 193 lb (87.5 kg)   07/25/19 202 lb (91.6 kg)        Medication Review:  Current Outpatient Medications   Medication Sig Dispense Refill    sertraline (ZOLOFT) 100 MG tablet Take 1 tablet by mouth daily 30 tablet 2    HYDROcodone-acetaminophen (NORCO) 7.5-325 MG per tablet Take 1 tablet by mouth every 6 hours as needed for Pain for up to 30 days. 90 tablet 0    diclofenac sodium 1 % GEL Apply 4 g topically 4 times daily 1 Tube 0    hydrOXYzine (ATARAX) 50 MG tablet Take 1-2 tablets by mouth nightly 60 tablet 0    atorvastatin (LIPITOR) 40 MG tablet TAKE 1 TABLET BY MOUTH ONE TIME A DAY 90 tablet 1    desonide (DESOWEN) 0.05 % lotion Apply topically 2 times daily. 118 mL 2    clobetasol (TEMOVATE) 0.05 % ointment Apply topically 2 times daily. 1 Tube 4     No current facility-administered medications for this visit. Review of Systems:   All others are negative, except as noted in the HPI. Patient History:  Past Medical History:   Diagnosis Date    ADD (attention deficit disorder)     Cervical dysplasia     Chronic leg pain     Depression     Diabetes mellitus, type 2 9/16/2013    Fibrocystic breast     Genital herpes 1994    Hemorrhoid     Hyperlipidemia     Metabolic syndrome     Mood disorder (HCC)     MRSA cellulitis 2007    facial    Obesity     Panic attacks     Plantar fasciitis     Recurrent UTI     Urethral stenosis     Urinary incontinence, mixed         Past Surgical History:   Procedure Laterality Date    CARPAL TUNNEL RELEASE Right 9/26/13    CHOLECYSTECTOMY      HYSTERECTOMY, TOTAL ABDOMINAL  10/2006    LABIAL ADHESION LYSIS  10/2006    OTHER SURGICAL HISTORY      No problems with anesthesia, no problems with healing, no problems with blood clots after surgery.  OTHER SURGICAL HISTORY  5/95    lipoma removal under arm    PLANTAR FASCIA SURGERY  October 2009    MRSA after surgery.         Social History     Socioeconomic History    Marital status:      Spouse name: Not on file    Number of children: 4    Years of education: Not on file    Highest education level: Not on file   Occupational History    Occupation:    Social Needs    Financial resource strain: Not on file    Food insecurity:     Worry: Not on file     Inability: Not on file    Transportation needs:     Medical: Not on file     Non-medical: Not on file   Tobacco Use    Smoking status: Former Smoker     Packs/day: 1.00     Years: 30.00     Pack years: 30.00     Start date: 10/30/1974     Last attempt to quit: 10/30/2004     Years since quitting: 15.2    Smokeless tobacco: Never Used    Tobacco comment: Year started: 65 Year Quit: 2004; Educated not to start back.    Substance and Sexual Activity    Alcohol use: No     Comment: occasionally     Drug use: No    Sexual activity: Never     Partners: Male   Lifestyle    Physical activity:     Days per week: Not on file     Minutes per session: Not on file    Stress: Not on file   Relationships    Social connections:     Talks on phone: Not on file     Gets together: Not on file     Attends Buddhism service: Not on file     Active member of club or organization: Not on file     Attends meetings of clubs or organizations: Not on file     Relationship status: Not on file    Intimate partner violence:     Fear of current or ex partner: Not on file     Emotionally abused: Not on file     Physically abused: Not on file     Forced sexual activity: Not on file   Other Topics Concern    Not on file   Social History Narrative    Not on file        Family History   Problem Relation Age of Onset    Diabetes Mother     Drug Abuse Mother     Psoriasis Mother     Asthma Mother     COPD Mother     Other Mother         shingles; cervical dysplasia    Heart Disease Father     Diabetes Father     Other Father         kidney stones    Asthma Maternal Aunt     Diabetes Maternal Aunt     Heart Disease Maternal Aunt     Other Maternal Aunt         hep B/cirrhosis    Diabetes Other     Other Sister         80 Maintenance Review:  Health Maintenance Due   Topic Date Due    Colon Cancer Screen FIT/FOBT  10/31/2018    Breast cancer screen  10/30/2019    Shingles Vaccine (3 of 3) 12/24/2019         Immunizations:  Immunization History   Administered Date(s) Administered    Hepatitis B Adult (Engerix-B) 09/13/2019    Influenza 10/04/2011    Influenza Virus Vaccine 10/20/2008, 09/21/2009, 10/12/2010, 10/07/2014    Influenza Whole 10/12/2010    Influenza, Intradermal, Preservative free 10/12/2012, 09/16/2013    Influenza, Intradermal, Quadrivalent, Preservative Free 10/30/2017    Influenza, Quadv, IM, (6 mo and older Fluzone, Flulaval, Fluarix and 3 yrs and older Afluria) 10/28/2016    Influenza, Quadv, IM, PF (6 mo and older Fluzone, Flulaval, Fluarix, and 3 yrs and older Afluria) 10/30/2018    Influenza, Quadv, Recombinant, IM PF (Flublok 18 yrs and older) 10/07/2019    Pneumococcal Polysaccharide (Khfkgetho33) 12/20/2013    Td, unspecified formulation 11/01/2007    Tdap (Boostrix, Adacel) 10/30/2017    Zoster Live (Zostavax) 01/01/2014    Zoster Recombinant (Shingrix) 10/29/2019         Assessment:   1. Urinary frequency    2. Palpitations    3. Stuart neuroma, left    4. Anxiety    5. Pain in both lower extremities    6. Chronic pain of right knee           Plan:  Urinary frequency  -     POCT Urinalysis no Micro  -     Urine Culture; Future  - Urine concentrated - Stay hydrated     Palpitations  - Continue on metoprolol 25 mg 1 tablet daily - f/u cardiology - but underlying issue of pac's d/w pt  Limit caffeine    Stuart neuroma, left  - dw/ pt - padding/ inserts encouraged - diclofenac gel topically for now but may need steroids systemically or injection for pain if persists  -f/u 1 month    Anxiety  - Take 0.5 tablet of zoloft 100 mg for 4 days;  Take effexor 150 mg every other day for 4 days  - Start on zoloft 100 mg 1 tablet daily after the 4 days  - se d/w pt  -f/u 1 month - sooner prn  -hydroxyzine 50mg hs/ prn anxiety / sleep    Pain in both lower extremities  -     HYDROcodone-acetaminophen (NORCO) 7.5-325 MG per tablet; Take 1 tablet by mouth every 6 hours as needed for Pain for up to 30 days. - increase to 90 for this month w/ increased pain in foot, but will need to resume taper down after this for chronic leg pain  OAR[place] report has been reviewed. Medication agreement form and urine drug screen up-to-date. No concerns for abuse of current medications. Chronic pain of right knee  -     HYDROcodone-acetaminophen (NORCO) 7.5-325 MG per tablet; Take 1 tablet by mouth every 6 hours as needed for Pain for up to 30 days. OARRS report has been reviewed. Medication agreement form and urine drug screen up-to-date. No concerns for abuse of current medications. Other orders  -     sertraline (ZOLOFT) 100 MG tablet; Take 1 tablet by mouth daily  -     diclofenac sodium 1 % GEL; Apply 4 g topically 4 times daily  -     hydrOXYzine (ATARAX) 50 MG tablet; Take 1-2 tablets by mouth nightly for insomnia  - Can take 0.5 to 2 tablets of atarax          I have reviewed patient's pertinent medical history, relevant laboratory and imaging studies, and past/future health maintenance. Patient's medications have been reviewed and were discussed with the patient. Refills given today, see note below. Discussed with the patient the importance of adhering to their current medication regimen as directed. Advised the patient that they should continue to work on eating a healthy balanced diet and staying active by exercising within their personal limits. Patient was advised to keep future appointments with their respective specialty care team(s). Patient had the opportunity to ask questions, all of which were answered to the best of my ability and with patient satisfaction. Patient advised to schedule a return visit for reevaluation in 1 month. Patient understands and is agreeable with the care plan following today's visit. Patient is to schedule an appointment for any new or worsening symptoms. Requested Prescriptions     Signed Prescriptions Disp Refills    sertraline (ZOLOFT) 100 MG tablet 30 tablet 2     Sig: Take 1 tablet by mouth daily    HYDROcodone-acetaminophen (NORCO) 7.5-325 MG per tablet 90 tablet 0     Sig: Take 1 tablet by mouth every 6 hours as needed for Pain for up to 30 days.  diclofenac sodium 1 % GEL 1 Tube 0     Sig: Apply 4 g topically 4 times daily    hydrOXYzine (ATARAX) 50 MG tablet 60 tablet 0     Sig: Take 1-2 tablets by mouth nightly      Orders Placed This Encounter   Procedures    Urine Culture     Standing Status:   Future     Number of Occurrences:   1     Standing Expiration Date:   1/22/2021     Order Specific Question:   Specify (ex-cath, midstream, cysto, etc)? Answer:   MIDSTREAM    POCT Urinalysis no Micro           By signing my name below, I, Sophia Camejo, attest that this documentation has been prepared under the direction and in the presence of Jen Serrano MD.   Electronically Signed: Alfredo Mike, 1/23/2020, 5:30 PM.      Levonia Harada, MD, personally performed the services described in this documentation. All medical record entries made by the tashiaibservando were at my direction and in my presence. I have reviewed the chart and discharge instructions (if applicable) and agree that the record reflects my personal performance and is accurate and complete.  Jen Serrano MD, 1/23/2020, 6:46 PM.

## 2020-01-24 LAB — URINE CULTURE, ROUTINE: NORMAL

## 2020-01-27 RX ORDER — TRAZODONE HYDROCHLORIDE 100 MG/1
50-100 TABLET ORAL NIGHTLY PRN
Qty: 30 TABLET | Refills: 2 | Status: SHIPPED | OUTPATIENT
Start: 2020-01-27 | End: 2020-02-25

## 2020-02-25 ENCOUNTER — OFFICE VISIT (OUTPATIENT)
Dept: FAMILY MEDICINE CLINIC | Age: 60
End: 2020-02-25
Payer: COMMERCIAL

## 2020-02-25 VITALS
DIASTOLIC BLOOD PRESSURE: 68 MMHG | HEART RATE: 56 BPM | RESPIRATION RATE: 12 BRPM | WEIGHT: 189 LBS | OXYGEN SATURATION: 99 % | SYSTOLIC BLOOD PRESSURE: 120 MMHG | HEIGHT: 64 IN | BODY MASS INDEX: 32.27 KG/M2

## 2020-02-25 LAB — HBA1C MFR BLD: 5.9 %

## 2020-02-25 PROCEDURE — 99214 OFFICE O/P EST MOD 30 MIN: CPT | Performed by: FAMILY MEDICINE

## 2020-02-25 PROCEDURE — 83036 HEMOGLOBIN GLYCOSYLATED A1C: CPT | Performed by: FAMILY MEDICINE

## 2020-02-25 RX ORDER — HYDROCODONE BITARTRATE AND ACETAMINOPHEN 7.5; 325 MG/1; MG/1
1 TABLET ORAL EVERY 6 HOURS PRN
Qty: 70 TABLET | Refills: 0 | Status: SHIPPED | OUTPATIENT
Start: 2020-02-25 | End: 2020-02-25 | Stop reason: SDUPTHER

## 2020-02-25 RX ORDER — METOPROLOL SUCCINATE 25 MG/1
25 TABLET, EXTENDED RELEASE ORAL SEE ADMIN INSTRUCTIONS
COMMUNITY
End: 2021-05-18

## 2020-02-25 RX ORDER — HYDROCODONE BITARTRATE AND ACETAMINOPHEN 7.5; 325 MG/1; MG/1
1 TABLET ORAL EVERY 6 HOURS PRN
Qty: 70 TABLET | Refills: 0 | Status: SHIPPED | OUTPATIENT
Start: 2020-04-25 | End: 2020-05-26 | Stop reason: SDUPTHER

## 2020-02-25 RX ORDER — HYDROCODONE BITARTRATE AND ACETAMINOPHEN 7.5; 325 MG/1; MG/1
1 TABLET ORAL EVERY 6 HOURS PRN
Qty: 70 TABLET | Refills: 0 | Status: SHIPPED | OUTPATIENT
Start: 2020-03-26 | End: 2020-02-25 | Stop reason: SDUPTHER

## 2020-02-25 NOTE — PROGRESS NOTES
[facility] [place], [address]  Phone: [phone]    HPI:  Chief Complaint   Patient presents with    Depression     1 MO DEPRESSION FOLLOW UP ON NEW MEDICATION         Aida Martin is a 61 y.o. female here for evaluation of depression. She notes that she recently visited her cardiologist and notes that her metoprolol was increased to 25 mg two times daily and her zoloft was increased to 100 mg. Patient states that her palpitations have been better. Patient notes that she does not take trazodone and states that she does not want to feel groggy the next day. Patient notes that she has tried melatonin in the past. She states that her legs are sore. Patient notes that she will be visiting a podiatrist in the near future for the pain in between her second and third toe on the left foot. She states that she was able to stretch out norco 7.5-325 mg 70 tablets for a month. She states that she has been eating a keto diet. Vitals:  BP Readings from Last 3 Encounters:   02/25/20 120/68   01/23/20 128/72   10/25/19 120/76       Pulse Readings from Last 3 Encounters:   02/25/20 56   01/23/20 84   10/25/19 74        Wt Readings from Last 3 Encounters:   02/25/20 189 lb (85.7 kg)   01/23/20 197 lb 1.6 oz (89.4 kg)   10/25/19 193 lb (87.5 kg)        Medication Review:  Current Outpatient Medications   Medication Sig Dispense Refill    metoprolol succinate (TOPROL XL) 25 MG extended release tablet Take 25 mg by mouth 2 times daily      [START ON 4/25/2020] HYDROcodone-acetaminophen (NORCO) 7.5-325 MG per tablet Take 1 tablet by mouth every 6 hours as needed for Pain for up to 30 days.  70 tablet 0    sertraline (ZOLOFT) 100 MG tablet Take 1 tablet by mouth daily 30 tablet 2    diclofenac sodium 1 % GEL Apply 4 g topically 4 times daily 1 Tube 0    atorvastatin (LIPITOR) 40 MG tablet TAKE 1 TABLET BY MOUTH ONE TIME A DAY 90 tablet 1    desonide (DESOWEN) 0.05 % lotion Apply topically 2 times daily. 118 mL 2    clobetasol (TEMOVATE) 0.05 % ointment Apply topically 2 times daily. 1 Tube 4     No current facility-administered medications for this visit. Review of Systems:   All others are negative, except as noted in the HPI. Patient History:  Past Medical History:   Diagnosis Date    ADD (attention deficit disorder)     Cervical dysplasia     Chronic leg pain     Depression     Diabetes mellitus, type 2 9/16/2013    Fibrocystic breast     Genital herpes 1994    Hemorrhoid     Hyperlipidemia     Metabolic syndrome     Mood disorder (HCC)     MRSA cellulitis 2007    facial    Obesity     Panic attacks     Plantar fasciitis     Recurrent UTI     Urethral stenosis     Urinary incontinence, mixed         Past Surgical History:   Procedure Laterality Date    CARPAL TUNNEL RELEASE Right 9/26/13    CHOLECYSTECTOMY      HYSTERECTOMY, TOTAL ABDOMINAL  10/2006    LABIAL ADHESION LYSIS  10/2006    OTHER SURGICAL HISTORY      No problems with anesthesia, no problems with healing, no problems with blood clots after surgery.  OTHER SURGICAL HISTORY  5/95    lipoma removal under arm    PLANTAR FASCIA SURGERY  October 2009    MRSA after surgery.         Social History     Socioeconomic History    Marital status:      Spouse name: Not on file    Number of children: 3    Years of education: Not on file    Highest education level: Not on file   Occupational History    Occupation:    Social Needs    Financial resource strain: Not on file    Food insecurity:     Worry: Not on file     Inability: Not on file    Transportation needs:     Medical: Not on file     Non-medical: Not on file   Tobacco Use    Smoking status: Former Smoker     Packs/day: 1.00     Years: 30.00     Pack years: 30.00     Start date: 10/30/1974     Last attempt to quit: 10/30/2004     Years since quitting: 15.3    Smokeless tobacco: Never Used    Tobacco comment: Year started: 65 Year Quit: 2004; Educated not to start back. Substance and Sexual Activity    Alcohol use: No     Comment: occasionally     Drug use: No    Sexual activity: Never     Partners: Male   Lifestyle    Physical activity:     Days per week: Not on file     Minutes per session: Not on file    Stress: Not on file   Relationships    Social connections:     Talks on phone: Not on file     Gets together: Not on file     Attends Caodaism service: Not on file     Active member of club or organization: Not on file     Attends meetings of clubs or organizations: Not on file     Relationship status: Not on file    Intimate partner violence:     Fear of current or ex partner: Not on file     Emotionally abused: Not on file     Physically abused: Not on file     Forced sexual activity: Not on file   Other Topics Concern    Not on file   Social History Narrative    Not on file        Family History   Problem Relation Age of Onset    Diabetes Mother     Drug Abuse Mother     Psoriasis Mother     Asthma Mother     COPD Mother     Other Mother         shingles; cervical dysplasia    Heart Disease Father     Diabetes Father     Other Father         kidney stones    Asthma Maternal Aunt     Diabetes Maternal Aunt     Heart Disease Maternal Aunt     Other Maternal Aunt         hep B/cirrhosis    Diabetes Other     Other Sister         1958 gallstones    Diabetes Sister     Cancer Sister         1958 hyst d/t Ca    Scoliosis Son     Other Son         hx of Cat scratch disease?  Heart Disease Son         cardiomyopathy    Asthma Son           Physical Exam:  Physical Exam  Vitals signs and nursing note reviewed. Constitutional:       General: She is not in acute distress. Appearance: She is well-developed. HENT:      Head: Normocephalic and atraumatic.       Right Ear: External ear normal.      Left Ear: External ear normal.   Eyes:      General: No scleral icterus. Conjunctiva/sclera: Conjunctivae normal.   Cardiovascular:      Rate and Rhythm: Normal rate and regular rhythm. Heart sounds: Normal heart sounds. No murmur. Pulmonary:      Effort: Pulmonary effort is normal. No respiratory distress. Breath sounds: Normal breath sounds. No wheezing or rales. Abdominal:      General: There is no distension. Palpations: Abdomen is soft. Tenderness: There is no abdominal tenderness. Musculoskeletal:         General: Tenderness (Pain in between second and third toe bases on left foot. No swelling or inflammation.) present. Skin:     General: Skin is warm and dry. Neurological:      Mental Status: She is alert and oriented to person, place, and time.               Laboratory Studies:  Lab Results   Component Value Date    LABA1C 5.7 04/24/2019    LABA1C 5.9 10/30/2018    LABA1C 5.7 10/30/2017        Lab Results   Component Value Date    WBC 5.2 10/25/2019    HGB 13.7 10/25/2019    HCT 41.4 10/25/2019    MCV 91.8 10/25/2019     10/25/2019        Lab Results   Component Value Date     10/25/2019    K 4.6 10/25/2019     10/25/2019    CO2 31 10/25/2019    BUN 19 10/25/2019    CREATININE 0.6 10/25/2019    GLUCOSE 113 (H) 10/25/2019    CALCIUM 9.6 10/25/2019    PROT 6.6 10/25/2019    LABALBU 4.9 10/25/2019    BILITOT 0.3 10/25/2019    ALKPHOS 68 10/25/2019    AST 22 10/25/2019    ALT 24 10/25/2019    LABGLOM >60 10/25/2019    GFRAA >60 10/25/2019    AGRATIO 2.9 (H) 10/25/2019    GLOB 1.7 10/25/2019       Lab Results   Component Value Date    CHOL 161 10/25/2019    TRIG 100 10/25/2019    HDL 44 10/25/2019    LDLCALC 97 10/25/2019    LABVLDL 20 10/25/2019       Lab Results   Component Value Date    TSH 2.37 01/22/2010        Lab Results   Component Value Date    VITD25 38.3 10/30/2018    VITD25 25.5 (L) 10/30/2017    VITD25 24.3 (L) 10/28/2016            Health Maintenance Review:  Health Maintenance Due   Topic Date Due    Colon type  - Stable -not using trazodone 2/2 am sedation and otc agents not effective - focus on sleep hygiene    Hyperglycemia-- on keto type / low carb diet but doesn't plan to remain so strict - just concerned about sugar - was 113 on cmp in fall     - 5.9 % A1C today - will monitor periodically w/ low cho diet    I have reviewed patient's pertinent medical history, relevant laboratory and imaging studies, and past/future health maintenance. Patient's medications have been reviewed and were discussed with the patient. Refills given today, see note below. Discussed with the patient the importance of adhering to their current medication regimen as directed. Advised the patient that they should continue to work on eating a healthy balanced diet and staying active by exercising within their personal limits. Patient was advised to keep future appointments with their respective specialty care team(s). Patient had the opportunity to ask questions, all of which were answered to the best of my ability and with patient satisfaction. Patient advised to schedule a return visit for reevaluation in 3 months. Patient understands and is agreeable with the care plan following today's visit. Patient is to schedule an appointment for any new or worsening symptoms. Requested Prescriptions     Signed Prescriptions Disp Refills    HYDROcodone-acetaminophen (NORCO) 7.5-325 MG per tablet 70 tablet 0     Sig: Take 1 tablet by mouth every 6 hours as needed for Pain for up to 30 days. No orders of the defined types were placed in this encounter. By signing my name below, Ric BISWAS, attest that this documentation has been prepared under the direction and in the presence of Jamie Reid MD.   Electronically Signed: Alfredo Rebollar, 2/25/2020, 10:18 AM.      Jamie BISWAS MD, personally performed the services described in this documentation.  All medical record entries made by the scribe were at my direction and in my presence. I have reviewed the chart and discharge instructions (if applicable) and agree that the record reflects my personal performance and is accurate and complete.  Joel Bone MD, 2/25/2020, 12:30 PM.

## 2020-04-20 RX ORDER — SERTRALINE HYDROCHLORIDE 100 MG/1
TABLET, FILM COATED ORAL
Qty: 30 TABLET | Refills: 2 | Status: SHIPPED | OUTPATIENT
Start: 2020-04-20 | End: 2020-05-26

## 2020-04-27 ENCOUNTER — TELEPHONE (OUTPATIENT)
Dept: ADMINISTRATIVE | Age: 60
End: 2020-04-27

## 2020-05-11 ENCOUNTER — TELEPHONE (OUTPATIENT)
Dept: FAMILY MEDICINE CLINIC | Age: 60
End: 2020-05-11

## 2020-05-11 NOTE — TELEPHONE ENCOUNTER
PT @  [phone]    PT CALLING SHE STUBBED HER TOE POSSIBLY BROKE IT - SHE IS LOOKING FOR ADVICE ON WHAT TO DO - WHETHER TO TAPE IT OR NOT?   ETX

## 2020-05-26 ENCOUNTER — TELEMEDICINE (OUTPATIENT)
Dept: FAMILY MEDICINE CLINIC | Age: 60
End: 2020-05-26
Payer: COMMERCIAL

## 2020-05-26 PROCEDURE — 99214 OFFICE O/P EST MOD 30 MIN: CPT | Performed by: FAMILY MEDICINE

## 2020-05-26 RX ORDER — SERTRALINE HYDROCHLORIDE 100 MG/1
150 TABLET, FILM COATED ORAL DAILY
Qty: 45 TABLET | Refills: 5 | Status: SHIPPED | OUTPATIENT
Start: 2020-05-26 | End: 2020-08-26 | Stop reason: SDUPTHER

## 2020-05-26 RX ORDER — HYDROCODONE BITARTRATE AND ACETAMINOPHEN 7.5; 325 MG/1; MG/1
1 TABLET ORAL EVERY 6 HOURS PRN
Qty: 70 TABLET | Refills: 0 | Status: SHIPPED | OUTPATIENT
Start: 2020-07-25 | End: 2020-08-24

## 2020-05-26 RX ORDER — HYDROCODONE BITARTRATE AND ACETAMINOPHEN 7.5; 325 MG/1; MG/1
1 TABLET ORAL EVERY 6 HOURS PRN
Qty: 70 TABLET | Refills: 0 | Status: SHIPPED | OUTPATIENT
Start: 2020-06-25 | End: 2020-07-25

## 2020-05-26 RX ORDER — HYDROCODONE BITARTRATE AND ACETAMINOPHEN 7.5; 325 MG/1; MG/1
1 TABLET ORAL EVERY 6 HOURS PRN
Qty: 70 TABLET | Refills: 0 | Status: SHIPPED | OUTPATIENT
Start: 2020-05-26 | End: 2020-08-26 | Stop reason: SDUPTHER

## 2020-05-26 NOTE — PROGRESS NOTES
No     Comment: occasionally     Drug use: No            PHYSICAL EXAMINATION:  [ INSTRUCTIONS:  \"[x]\" Indicates a positive item  \"[]\" Indicates a negative item  -- DELETE ALL ITEMS NOT EXAMINED]  Vital Signs: (As obtained by patient/caregiver or practitioner observation)    Blood pressure-  Heart rate-    Respiratory rate-    Temperature-  Pulse oximetry-     Constitutional: [x] Appears well-developed and well-nourished [x] No apparent distress      [] Abnormal-   Mental status  [x] Alert and awake  [x] Oriented to person/place/time [x]Able to follow commands      Eyes:  EOM    [x]  Normal  [] Abnormal-  Sclera  [x]  Normal  [] Abnormal -         Discharge [x]  None visible  [] Abnormal -    HENT:   [x] Normocephalic, atraumatic. [] Abnormal   [] Mouth/Throat: Mucous membranes are moist.     External Ears [x] Normal  [] Abnormal-     Neck: [x] No visualized mass     Pulmonary/Chest: [x] Respiratory effort normal.  [x] No visualized signs of difficulty breathing or respiratory distress        [] Abnormal-      Musculoskeletal:   [] Normal gait with no signs of ataxia         [] Normal range of motion of neck        [] Abnormal-       Neurological:        [x] No Facial Asymmetry (Cranial nerve 7 motor function) (limited exam to video visit)          [] No gaze palsy        [] Abnormal-         Skin:        [x] No significant exanthematous lesions or discoloration noted on facial skin         [] Abnormal-            Psychiatric:       [x] Normal Affect [] No Hallucinations        [] Abnormal-     Other pertinent observable physical exam findings-     ASSESSMENT/PLAN:   Diagnosis Orders   1. Palpitations     2. Pain in both lower extremities  HYDROcodone-acetaminophen (NORCO) 7.5-325 MG per tablet    HYDROcodone-acetaminophen (NORCO) 7.5-325 MG per tablet    HYDROcodone-acetaminophen (NORCO) 7.5-325 MG per tablet   3.  Chronic pain of right knee  HYDROcodone-acetaminophen (NORCO) 7.5-325 MG per tablet used to authenticate this note.

## 2020-07-07 ENCOUNTER — OFFICE VISIT (OUTPATIENT)
Dept: PRIMARY CARE CLINIC | Age: 60
End: 2020-07-07
Payer: COMMERCIAL

## 2020-07-07 PROCEDURE — 99211 OFF/OP EST MAY X REQ PHY/QHP: CPT | Performed by: FAMILY MEDICINE

## 2020-07-07 NOTE — PROGRESS NOTES
Lolita Murillo received a viral test for COVID-19. They were educated on isolation and quarantine as appropriate. For any symptoms, they were directed to seek care from their PCP, given contact information to establish with a doctor, directed to an urgent care or the emergency room.

## 2020-07-07 NOTE — PATIENT INSTRUCTIONS
Steps to help prevent the spread of COVID-19 if you are sick  SOURCE - https://lewis-jackson.info/. html     Stay home except to get medical care   ; Stay home: People who are mildly ill with COVID-19 are able to isolate at home during their illness. You should restrict activities outside your home, except for getting medical care.   ; Avoid public areas: Do not go to work, school, or public areas.   ; Avoid public transportation: Avoid using public transportation, ride-sharing, or taxis.  ; Separate yourself from other people and animals in your home   ; Stay away from others: As much as possible, you should stay in a specific room and away from other people in your home. Also, you should use a separate bathroom, if available.   ; Limit contact with pets & animals: You should restrict contact with pets and other animals while you are sick with COVID-19, just like you would around other people. Although there have not been reports of pets or other animals becoming sick with COVID-19, it is still recommended that people sick with COVID-19 limit contact with animals until more information is known about the virus. ; When possible, have another member of your household care for your animals while you are sick. If you are sick with COVID-19, avoid contact with your pet, including petting, snuggling, being kissed or licked, and sharing food. If you must care for your pet or be around animals while you are sick, wash your hands before and after you interact with pets and wear a facemask. See COVID-19 and Animals for more information. Other considerations   The ill person should eat/be fed in their room if possible. Non-disposable  items used should be handled with gloves and washed with hot water or in a . Clean hands after handling used  items.  If possible, dedicate a lined trash can for the ill person.  Use gloves when removing garbage bags, handling, and disposing of trash. Wash hands after handling or disposing of trash.  Consider consulting with your local health department about trash disposal guidance if available. Information for Household Members and Caregivers of Someone who is Sick   Call ahead before visiting your doctor   Call ahead: If you have a medical appointment, call the healthcare provider and tell them that you have or may have COVID-19. This will help the healthcare provider's office take steps to keep other people from getting infected or exposed. Wear a facemask if you are sick   ; If you are sick: You should wear a facemask when you are around other people (e.g., sharing a room or vehicle) or pets and before you enter a healthcare provider's office. ; If you are caring for others: If the person who is sick is not able to wear a facemask (for example, because it causes trouble breathing), then people who live with the person who is sick should not stay in the same room with them, or they should wear a facemask if they enter a room with the person who is sick. Cover your coughs and sneezes   ; Cover: Cover your mouth and nose with a tissue when you cough or sneeze.   ; Dispose: Throw used tissues in a lined trash can.   ; Wash hands: Immediately wash your hands with soap and water for at least 20 seconds or, if soap and water are not available, clean your hands with an alcohol-based hand  that contains at least 60% alcohol. Clean your hands often   ;  Wash hands: Wash your hands often with soap and water for at least 20 seconds, especially after blowing your nose, coughing, or sneezing; going to the bathroom; and before eating or preparing food.   ; Hand : If soap and water are not readily available, use an alcohol-based hand  with at least 60% alcohol, covering all surfaces of your hands and rubbing them together until they feel dry.   ; Soap and water: Soap and water are the best option if facility. These steps will help the healthcare provider's office to keep other people in the office or waiting room from getting infected or exposed. ; Alert health department: Ask your healthcare provider to call the local or state health department. Persons who are placed under active monitoring or facilitated self-monitoring should follow instructions provided by their local health department or occupational health professionals, as appropriate.  ; Call 911 if you have a medical emergency: If you have a medical emergency and need to call 911, notify the dispatch personnel that you have, or are being evaluated for COVID-19. If possible, put on a facemask before emergency medical services arrive.

## 2020-07-10 LAB
SARS-COV-2: NOT DETECTED
SOURCE: NORMAL

## 2020-08-26 ENCOUNTER — OFFICE VISIT (OUTPATIENT)
Dept: FAMILY MEDICINE CLINIC | Age: 60
End: 2020-08-26
Payer: COMMERCIAL

## 2020-08-26 VITALS
RESPIRATION RATE: 12 BRPM | WEIGHT: 211 LBS | DIASTOLIC BLOOD PRESSURE: 70 MMHG | HEIGHT: 64 IN | HEART RATE: 90 BPM | SYSTOLIC BLOOD PRESSURE: 126 MMHG | BODY MASS INDEX: 36.02 KG/M2 | TEMPERATURE: 97.6 F

## 2020-08-26 LAB
AMPHETAMINE SCREEN, URINE: NORMAL
BARBITURATE SCREEN, URINE: NORMAL
BENZODIAZEPINE SCREEN, URINE: NORMAL
BUPRENORPHINE URINE: NORMAL
COCAINE METABOLITE SCREEN URINE: NORMAL
GABAPENTIN SCREEN, URINE: NORMAL
HBA1C MFR BLD: 6 %
MDMA URINE: NORMAL
METHADONE SCREEN, URINE: NORMAL
METHAMPHETAMINE, URINE: NORMAL
OPIATE SCREEN URINE: NORMAL
OXYCODONE SCREEN URINE: NORMAL
PHENCYCLIDINE SCREEN URINE: NORMAL
PROPOXYPHENE SCREEN, URINE: NORMAL
THC SCREEN, URINE: NORMAL
TRICYCLIC ANTIDEPRESSANTS, UR: NORMAL

## 2020-08-26 PROCEDURE — 80305 DRUG TEST PRSMV DIR OPT OBS: CPT | Performed by: NURSE PRACTITIONER

## 2020-08-26 PROCEDURE — 99213 OFFICE O/P EST LOW 20 MIN: CPT | Performed by: NURSE PRACTITIONER

## 2020-08-26 PROCEDURE — 83036 HEMOGLOBIN GLYCOSYLATED A1C: CPT | Performed by: NURSE PRACTITIONER

## 2020-08-26 RX ORDER — HYDROCODONE BITARTRATE AND ACETAMINOPHEN 7.5; 325 MG/1; MG/1
1 TABLET ORAL EVERY 6 HOURS PRN
Qty: 70 TABLET | Refills: 0 | Status: SHIPPED | OUTPATIENT
Start: 2020-08-26 | End: 2020-09-25

## 2020-08-26 RX ORDER — ATORVASTATIN CALCIUM 40 MG/1
TABLET, FILM COATED ORAL
Qty: 90 TABLET | Refills: 0 | Status: SHIPPED | OUTPATIENT
Start: 2020-08-26 | End: 2021-02-22

## 2020-08-26 RX ORDER — SERTRALINE HYDROCHLORIDE 100 MG/1
150 TABLET, FILM COATED ORAL DAILY
Qty: 45 TABLET | Refills: 1 | Status: SHIPPED
Start: 2020-08-26 | End: 2020-12-03 | Stop reason: SDUPTHER

## 2020-08-26 RX ORDER — HYDROCODONE BITARTRATE AND ACETAMINOPHEN 7.5; 325 MG/1; MG/1
1 TABLET ORAL EVERY 8 HOURS PRN
Qty: 90 TABLET | Refills: 0 | Status: SHIPPED | OUTPATIENT
Start: 2020-09-25 | End: 2020-10-25

## 2020-08-26 RX ORDER — HYDROCODONE BITARTRATE AND ACETAMINOPHEN 7.5; 325 MG/1; MG/1
1 TABLET ORAL EVERY 8 HOURS PRN
Qty: 90 TABLET | Refills: 0 | Status: SHIPPED | OUTPATIENT
Start: 2020-10-25 | End: 2020-11-15 | Stop reason: SDUPTHER

## 2020-08-26 ASSESSMENT — PATIENT HEALTH QUESTIONNAIRE - PHQ9
SUM OF ALL RESPONSES TO PHQ QUESTIONS 1-9: 0
SUM OF ALL RESPONSES TO PHQ QUESTIONS 1-9: 0
2. FEELING DOWN, DEPRESSED OR HOPELESS: 0
1. LITTLE INTEREST OR PLEASURE IN DOING THINGS: 0
SUM OF ALL RESPONSES TO PHQ9 QUESTIONS 1 & 2: 0

## 2020-08-26 ASSESSMENT — ENCOUNTER SYMPTOMS: BACK PAIN: 1

## 2020-08-26 NOTE — PROGRESS NOTES
2020     Juan Pablo Portillo (:  1960) is a 61 y.o. female, here for evaluation of the following medical concerns:    HPI   Chief Complaint   Patient presents with    Pain     3 MO PAIN ROUTINE FOLLOW  [facility]- CHRONIC BACK PAIN MORE THAN TEN YEARS NO INJURY NOTED- WORSE WITH LIFTING OR MOVING A CERTAIN WAY FEELS A TWINGE OF PAIN  - THE ACHING PAIN IS MORE IN HER THIGHS- HAS A HARD TIME GETTING COMFORTABLE AT NIGHT SHE TAKES IBUPROFEN AS NEEDED AND THIS HELPS - SHE RATES THE PAIN/7/10- NORCO 1-2 TABS DAILY AND THIS TAKES THE EDGE OFF- NO ISSUES WITH THE MEDICATION  Change in quality of symptoms: no.  Associated symptoms: none. She denies any other symptoms. Current treatment: NORCO 7.5/32 MG , IIBUPROFEN AS NEED Medication side effects: none. Recent diagnostic testing: none. Review of Systems   Musculoskeletal: Positive for arthralgias and back pain. All other systems reviewed and are negative. Prior to Visit Medications    Medication Sig Taking? Authorizing Provider   sertraline (ZOLOFT) 100 MG tablet Take 1.5 tablets by mouth daily Yes Felicity Felipe MD   metoprolol succinate (TOPROL XL) 25 MG extended release tablet Take 25 mg by mouth 2 times daily Yes Historical Provider, MD   atorvastatin (LIPITOR) 40 MG tablet TAKE 1 TABLET BY MOUTH ONE TIME A DAY Yes Felicity Felipe MD        Social History     Tobacco Use    Smoking status: Former Smoker     Packs/day: 1.00     Years: 30.00     Pack years: 30.00     Start date: 10/30/1974     Last attempt to quit: 10/30/2004     Years since quitting: 15.8    Smokeless tobacco: Never Used    Tobacco comment: Year started: 65 Year Quit: ; Educated not to start back.    Substance Use Topics    Alcohol use: No     Comment: occasionally         Vitals:    20 0942   BP: 126/70   Site: Right Upper Arm   Position: Sitting   Cuff Size: Large Adult   Pulse: 90   Resp: 12   Temp: 97.6 °F (36.4 °C)   TempSrc: Infrared   Weight: 211 lb (95.7 kg)   Height: 5' 3.5\" (1.613 m)     Estimated body mass index is 36.79 kg/m² as calculated from the following:    Height as of this encounter: 5' 3.5\" (1.613 m). Weight as of this encounter: 211 lb (95.7 kg). Physical Exam  Vitals signs reviewed. Constitutional:       General: She is awake. She is not in acute distress. Appearance: Normal appearance. She is well-developed and well-groomed. She is obese. She is not ill-appearing, toxic-appearing or diaphoretic. Skin:     Comments: LEFT HAND BASE OF THUMB  SLIGHTLY TENDER TO TOUCH - NO SWELLING NOTED   Neurological:      Mental Status: She is alert. GCS: GCS eye subscore is 4. GCS verbal subscore is 5. GCS motor subscore is 6. Psychiatric:         Attention and Perception: Attention and perception normal.         Mood and Affect: Mood and affect normal.         Speech: Speech normal.         Behavior: Behavior normal. Behavior is cooperative. Thought Content: Thought content normal.         Cognition and Memory: Cognition and memory normal.         Judgment: Judgment normal.         Wander Antonio was seen today for pain. Diagnoses and all orders for this visit:    Chronic pain of right knee  Pain in both lower extremities  Controlled substances monitoring: possible medication side effects, risk of tolerance and/or dependence, and alternative treatments discussed, no signs of potential drug abuse or diversion identified, [facility]AR[facility] report reviewed today- activity consistent with treatment plan, medication contract signed today and random urine drug screen sent today. -     POCT Rapid Drug Screen- WNL  -     HYDROcodone-acetaminophen (NORCO) 7.5-325 MG per tablet; Take 1 tablet by mouth every 6 hours as needed for Pain for up to 30 days. FILL 8/26  -     HYDROcodone-acetaminophen (NORCO) 7.5-325 MG per tablet; Take 1 tablet by mouth every 8 hours as needed for Pain for up to 30 days.  FILL 9/25 Intended supply: 27 days  -     HYDROcodone-acetaminophen (NORCO) 7.5-325 MG per tablet; Take 1 tablet by mouth every 8 hours as needed for Pain for up to 30 days. FILL 10/25 Intended supply: 30 days        Hand pain, left  DIFFERENTIAL DIAGNOSES ARTHRITIS  -     diclofenac sodium (VOLTAREN) 1 % GEL; Apply 4 g topically 4 times daily  EXERCISES REVIEWED WITH PT AS WELL    Breast cancer screening  -     [facility] DIGITAL SCREEN W OR WO CAD BILATERAL; Future    Pre-diabetes  -     POCT glycosylated hemoglobin (Hb A1C) 6.0  ENCOURAGED HER TO  decrease intake of simple carbs- white rice,bread,pasta ,sugar increasing complex carbs oats- grains- recheck in three months    Pure hypercholesterolemia  -     atorvastatin (LIPITOR) 40 MG tablet; TAKE 1 TABLET BY MOUTH ONE TIME A DAY    -     sertraline (ZOLOFT) 100 MG tablet;  Take 1.5 tablets by mouth daily

## 2020-08-26 NOTE — LETTER
MEDICATION AGREEMENT     Yanira Precise  [birth year]      For certain conditions, multiple classes of medications may be used to help better manage your symptoms, and to improve your ability to function at home, work and in social settings. However, these medications do have risks, which will be discussed with you, including addiction and dependency. The following prescribed medications need frequent monitoring and will require you to partner and assist in your healthcare. Medication  Dose, instructions and quantity as indicated on current prescription bottle Diagnosis/Reason(s) for Taking Category   NORCO 7.5-325MG TAKE 1 TABLET BY MOUTH EVERY 6 HOURS #70   PAIN                             Benefits and goals of Controlled Substance Medications: There are two potential goals for your treatment: (1) decreased pain and suffering (2) improved daily life functions. There are many possible treatments for your chronic condition(s), and, in addition to controlled substance medications, we will try alternatives such as physical therapy, yoga, massage, home daily exercise, meditation, relaxation techniques, injections, chiropractic manipulations, surgery, cognitive therapy, hypnosis and many medications that are not habit-forming. Use of controlled substance medications may be helpful, but they are unlikely to resolve all of your symptoms or restore all function. Risks of Controlled Substance Medications:    Opioid pain medications: These medications can lead to problems such as addiction/dependence, sedation, lightheadedness/dizziness, memory issues, falls, constipation, nausea, or vomiting. They may also impair the ability to drive or operate machinery. Additionally, these medications may lower testosterone levels, leading to loss of bone strength, stamina and sex drive.   They may cause problems with breathing, sleep apnea and reduced coughing, which are especially dangerous for patients with lung supplements and oral decongestants. Dependence withdrawal symptoms may include depressed mood, loss of interest, suicidal thoughts, anxiety, fatigue, appetite changes and agitation. Testosterone replacement therapy:  Potential side effects include increased risk of stroke and heart attack, blood clots, increased blood pressure, increased cholesterol, enlarged prostate, sleep apnea, irritability/aggression and other mood disorders, and decreased fertility. Other:     1. I understand that I have the following responsibilities:  · I will take medications at the dose and frequency prescribed. · I will not increase or change how I take my medications without the approval of the health care provider who signs this Medication Agreement. · I will arrange for refills at the prescribed interval ONLY during regular office hours. I will not ask for refills earlier than agreed, after-hours, on holidays or on weekends. · I will obtain all refills for these medications at  ·  _____[facility]_______  pharmacy (phone number  ·  __[phone] ___), with full consent for my provider and pharmacist to exchange information in writing or verbally. · I will not request any pain medications or controlled substances from other providers and will inform this provider of all other medications I am taking. · I will inform my other health care providers that I am taking these medications and of the existence of this [facility]. In the event of an emergency, I will provide the same information to the emergency department providers. · I will protect my prescriptions and medications. I understand that lost or misplaced prescriptions will not be replaced. · I will keep medications only for my own use and will not share them with others. I will keep all medications away from children. · I agree to participate in any medical, psychological or psychiatric assessments recommended by my provider. · I will actively participate in any program designed to improve function, including social, physical, psychological and daily or work activities. 2. I will not use illegal or street drugs or another person's prescription. If I have an addiction problem with drugs or alcohol and my provider asks me to enter a program to address this issue, I agree to follow through. Such programs may include:  · 12-Step program and securing a sponsor  · Individual counseling   · Inpatient or outpatient treatment  · Other:_____________________________________________________________________________________________________________________________________________    If in treatment, I will request that a copy of the programs initial evaluation and treatment recommendations be sent to this provider and will not expect refills until that is received. I will also request written monthly updates be sent to this provider to verify my continuing treatment. 3. I will consent to drug screening upon my providers request to assure I am only taking the prescribed drugs, described in this MEDICATION AGREEMENT. I understand that a drug screen is a laboratory test in which a sample of my urine, blood or saliva is checked to see what drugs I have been taking. 4. I agree that I will treat the providers and staff at this office with respect at all times. I will keep all of my scheduled appointments, but if I need to cancel my appointment, I will do so a minimum of 24 hours before it is scheduled. 5. I understand that this provider may stop prescribing the medications listed if:  · I do not show any improvement in pain, or my activity has not improved. · I develop rapid tolerance or loss of improvement, as described in my treatment plan. · I develop significant side effects from the medication.   · My behavior is inconsistent with the responsibilities outlined above, which may also result in my being prevented from receiving further care from this office. · Other:____________________________________________________________________    AGREEMENT:    I have read the above and have had all of my questions answered. For chronic disease management, I know that my symptoms can be managed with many types of treatments. A chronic medication trial may be part of my treatment, but I must be an active participant in my care. Medication therapy is only one part of my symptom management plan. In some cases, there may be limited scientific evidence to support the chronic use of certain medications to improve symptoms and daily function. Furthermore, in certain circumstances, there may be scientific information that suggests that use of chronic controlled substances may actually worsen my symptoms and increase my risk of unintentional death directly related to this medication therapy. I know that if my provider feels my risk from controlled medications is greater than my benefit, I will have my controlled substance medication(s) compassionately lowered or removed altogether. I agree to a controlled substance medication trial.      I further agree to allow this office to contact my HIPAA contact on file if there are concerns about my safety and use of controlled medications. I have agreed to use the following medications above as instructed by my physician and as stated in this [facility] [address].      Patient Signature:  ______________________  Date:8/26/2020 or _____________    Provider Signature:______________________  Date:8/26/2020 or _____________

## 2020-08-26 NOTE — PATIENT INSTRUCTIONS
Patient Education        Chronic Pain: Care Instructions  Your Care Instructions     Chronic pain is pain that lasts a long time (months or even years) and may or may not have a clear cause. It is different from acute pain, which usually does have a clear cause--like an injury or illness--and gets better over time. Chronic pain:  · Lasts over time but may vary from day to day. · Does not go away despite efforts to end it. · May disrupt your sleep and lead to fatigue. · May cause depression or anxiety. · May make your muscles tense, causing more pain. · Can disrupt your work, hobbies, home life, and relationships with friends and family. Chronic pain is a very real condition. It is not just in your head. Treatment can help and usually includes several methods used together, such as medicines, physical therapy, exercise, and other treatments. Learning how to relax and changing negative thought patterns can also help you cope. Chronic pain is complex. Taking an active role in your treatment will help you better manage your pain. Tell your doctor if you have trouble dealing with your pain. You may have to try several things before you find what works best for you. Follow-up care is a key part of your treatment and safety. Be sure to make and go to all appointments, and call your doctor if you are having problems. It's also a good idea to know your test results and keep a list of the medicines you take. How can you care for yourself at home? · Pace yourself. Break up large jobs into smaller tasks. Save harder tasks for days when you have less pain, or go back and forth between hard tasks and easier ones. Take rest breaks. · Relax, and reduce stress. Relaxation techniques such as deep breathing or meditation can help. · Keep moving. Gentle, daily exercise can help reduce pain over the long run. Try low- or no-impact exercises such as walking, swimming, and stationary biking.  Do stretches to stay flexible. · Try heat, cold packs, and massage. · Get enough sleep. Chronic pain can make you tired and drain your energy. Talk with your doctor if you have trouble sleeping because of pain. · Think positive. Your thoughts can affect your pain level. Do things that you enjoy to distract yourself when you have pain instead of focusing on the pain. See a movie, read a book, listen to music, or spend time with a friend. · If you think you are depressed, talk to your doctor about treatment. · Keep a daily pain diary. Record how your moods, thoughts, sleep patterns, activities, and medicine affect your pain. You may find that your pain is worse during or after certain activities or when you are feeling a certain emotion. Having a record of your pain can help you and your doctor find the best ways to treat your pain. · Take pain medicines exactly as directed. ? If the doctor gave you a prescription medicine for pain, take it as prescribed. ? If you are not taking a prescription pain medicine, ask your doctor if you can take an over-the-counter medicine. Reducing constipation caused by pain medicine  · Include fruits, vegetables, beans, and whole grains in your diet each day. These foods are high in fiber. · Drink plenty of fluids, enough so that your urine is light yellow or clear like water. If you have kidney, heart, or liver disease and have to limit fluids, talk with your doctor before you increase the amount of fluids you drink. · If your doctor recommends it, get more exercise. Walking is a good choice. Bit by bit, increase the amount you walk every day. Try for at least 30 minutes on most days of the week. · Schedule time each day for a bowel movement. A daily routine may help. Take your time and do not strain when having a bowel movement. When should you call for help? Call your doctor now or seek immediate medical care if:  · Your pain gets worse or is out of control.   · You feel down or blue, or you do not enjoy things like you once did. You may be depressed, which is common in people with chronic pain. Depression can be treated. · You have vomiting or cramps for more than 2 hours. Watch closely for changes in your health, and be sure to contact your doctor if:  · You cannot sleep because of pain. · You are very worried or anxious about your pain. · You have trouble taking your pain medicine. · You have any concerns about your pain medicine. · You have trouble with bowel movements, such as:  ? No bowel movement in 3 days. ? Blood in the anal area, in your stool, or on the toilet paper. ? Diarrhea for more than 24 hours. Where can you learn more? Go to https://LumaCyte.[employer]. org and sign in to your [employer] account. Enter N004 in the Spring Metrics box to learn more about \"Chronic Pain: Care Instructions. \"     If you do not have an account, please click on the \"Sign Up Now\" link. Current as of: November 20, 2019               Content Version: 12.5  © 5887-6084 [employer]. Care instructions adapted under license by [address] If you have questions about a medical condition or this instruction, always ask your healthcare professional. [place] [address] any warranty or liability for your use of this information. Patient Education        Learning About Safe Use of Long-Acting Opioids  Introduction    Long-acting opioids relieve moderate to severe pain. They are also called extended-release opioids. Opioids relieve pain by changing the way your body feels pain. They don't cure a health problem. They help you manage the pain. If you take a lot of short-acting pain medicine, your doctor may give you long-acting opioids. They help you avoid the ups and downs in pain relief that you may have with short-acting medicine. Opioids are strong medicines. They can help you manage pain when you use them the right way.  But if you misuse them, they can cause serious harm and even death. If you decide to take opioids, here are some things to remember. · Keep your doctor informed. You can develop opioid use disorder. Moderate to severe opioid use disorder is sometimes called addiction. The risk is higher if you have a history of substance use. Your doctor will monitor you closely for signs of opioid use disorder and to figure out when you no longer need to take opioids. · Make a treatment plan. The goal of your plan is to be able to function and do the things you need to do, even if you still have some pain. You might be able to manage your pain with other non-opioid options like physical therapy, relaxation, or over-the-counter pain medicines. · Be aware of the side effects. Opioids can cause serious side effects, such as constipation, dry mouth, and nausea. And over time, you may need a higher dose to get pain relief. This is called tolerance. Your body also gets used to opioids. This is called physical dependence. If you suddenly stop taking them, you may have withdrawal symptoms. Examples  · Fentanyl patch (Duragesic)  · Methadone (Dolophine)  · Morphine (Jodi)  · Oxycodone controlled-release (OxyContin)  Safety tips  If you need to take opioids to manage your pain, remember these safety tips. · Follow directions carefully. It's easy to misuse opioids if you take a dose other than what's prescribed by your doctor. This can lead to overdose and even death. Even sharing them with someone they weren't meant for is misuse. · Be cautious. Opioids may affect your judgment and decision making. Do not drive or operate machinery until you can think clearly. Talk with your doctor about when it is safe to drive. · Reduce the risk of drug interactions. Opioids can be dangerous if you take them with alcohol or with certain drugs like sleeping pills and muscle relaxers.  Make sure your doctor knows about all the other medicines you take, including over-the-counter medicines. Don't start any new medicines before you talk to your doctor or pharmacist.  · Safely store and dispose of opioids. Store opioids in a safe and secure place. Make sure that pets, children, friends, and family can't get to them. When you're done using opioids, make sure to dispose of them safely and as quickly as possible. The U.S. Food and Drug Administration (FDA) recommends these disposal options. ? The best option is to take your medicine to a drop-off box or take-back program that is authorized by the [facility] ([facility]). ? If these programs aren't available in your area and your medicine doesn't have specific disposal instructions (such as flushing), you can throw them into your household trash if you follow the FDA's instructions. Visit fda.gov and search for \"unused medicine disposal.\"  ? If you have opioid patches (used or unused), your options are to take them to a [facility]-authorized site or flush them down the toilet. Do not throw them in the trash. ? Only flush your medicine down the toilet if you can't get to a [facility]-approved site or your medicine instructions state clearly to flush them. · Reduce the risk of overdose. Misuse of opioids can be very dangerous. Protect yourself by asking your doctor about a naloxone rescue kit. It can help you--and even save your life--if you take too much of an opioid. Possible side effects  All medicines have side effects. But many people don't feel the side effects, or they are able to deal with them. You may:  · Feel confused or have a hard time thinking clearly. · Be constipated. · Feel faint, dizzy, or lightheaded. · Feel drowsy. · Feel sick to your stomach or vomit. · Have an allergic reaction. Where can you learn more? Go to https://myTomorrowsmarleneMob.ly.Angstro. org and sign in to your [employer] account.  Enter O105 in the Kudarom box to learn more about \"Learning About Safe Use of Long-Acting Opioids. \"     If you do not have an account, please click on the \"Sign Up Now\" link. Current as of: November 20, 2019               Content Version: 12.5  © 2006-2020 Healthwise, Incorporated. Care instructions adapted under license by [facility] ([facility]). If you have questions about a medical condition or this instruction, always ask your healthcare professional. [address] any warranty or liability for your use of this information. Patient Education        Hand Arthritis: Exercises  Introduction  Here are some examples of exercises for you to try. The exercises may be suggested for a condition or for rehabilitation. Start each exercise slowly. Ease off the exercises if you start to have pain. You will be told when to start these exercises and which ones will work best for you. How to do the exercises  Tendon glides   1. In this exercise, the steps follow one another to a make a continuous movement. 2. With your affected hand, point your fingers and thumb straight up. Your wrist should be relaxed, following the line of your fingers and thumb. 3. Curl your fingers so that the top two joints in them are bent, and your fingers wrap down. Your fingertips should touch or be near the base of your fingers. Your fingers will look like a hook. 4. Make a fist by bending your knuckles. Your thumb can gently rest against your index (pointing) finger. 5. Unwind your fingers slightly so that your fingertips can touch the base of your palm. Your thumb can rest against your index finger. 6. Move back to your starting position, with your fingers and thumb pointing up. 7. Repeat the series of motions 8 to 12 times. 8. Switch hands and repeat steps 1 through 6, even if only one hand is sore. Intrinsic flexion   1. Rest your affected hand on a table and bend the large joints where your fingers connect to your hand. Keep your thumb and the other joints in your fingers straight.   2. Slowly hand is sore. Follow-up care is a key part of your treatment and safety. Be sure to make and go to all appointments, and call your doctor if you are having problems. It's also a good idea to know your test results and keep a list of the medicines you take. Where can you learn more? Go to https://chpepiceweb.Peopleclick Authoria. org and sign in to your [employer] account. Enter I739 in the Tendril box to learn more about \"Hand Arthritis: Exercises. \"     If you do not have an account, please click on the \"Sign Up Now\" link. Current as of: March 2, 2020               Content Version: 12.5  © 2006-2020 Healthwise, Incorporated. Care instructions adapted under license by [facility] ([facility]). If you have questions about a medical condition or this instruction, always ask your healthcare professional. [address] any warranty or liability for your use of this information.

## 2020-11-09 ENCOUNTER — TELEPHONE (OUTPATIENT)
Dept: FAMILY MEDICINE CLINIC | Age: 60
End: 2020-11-09

## 2020-11-11 ENCOUNTER — NURSE ONLY (OUTPATIENT)
Dept: FAMILY MEDICINE CLINIC | Age: 60
End: 2020-11-11
Payer: COMMERCIAL

## 2020-11-11 VITALS — TEMPERATURE: 97 F

## 2020-11-11 LAB
A/G RATIO: 1.8 (ref 1.1–2.2)
ALBUMIN SERPL-MCNC: 4.1 G/DL (ref 3.4–5)
ALP BLD-CCNC: 63 U/L (ref 40–129)
ALT SERPL-CCNC: 23 U/L (ref 10–40)
ANION GAP SERPL CALCULATED.3IONS-SCNC: 12 MMOL/L (ref 3–16)
AST SERPL-CCNC: 20 U/L (ref 15–37)
BASOPHILS ABSOLUTE: 0 K/UL (ref 0–0.2)
BASOPHILS RELATIVE PERCENT: 0.5 %
BILIRUB SERPL-MCNC: 0.5 MG/DL (ref 0–1)
BUN BLDV-MCNC: 12 MG/DL (ref 7–20)
CALCIUM SERPL-MCNC: 9.2 MG/DL (ref 8.3–10.6)
CHLORIDE BLD-SCNC: 103 MMOL/L (ref 99–110)
CHOLESTEROL, TOTAL: 160 MG/DL (ref 0–199)
CO2: 27 MMOL/L (ref 21–32)
CREAT SERPL-MCNC: 0.7 MG/DL (ref 0.6–1.2)
EOSINOPHILS ABSOLUTE: 0.2 K/UL (ref 0–0.6)
EOSINOPHILS RELATIVE PERCENT: 3 %
GFR AFRICAN AMERICAN: >60
GFR NON-AFRICAN AMERICAN: >60
GLOBULIN: 2.3 G/DL
GLUCOSE BLD-MCNC: 117 MG/DL (ref 70–99)
HCT VFR BLD CALC: 40.2 % (ref 36–48)
HDLC SERPL-MCNC: 55 MG/DL (ref 40–60)
HEMOGLOBIN: 13.4 G/DL (ref 12–16)
LDL CHOLESTEROL CALCULATED: 80 MG/DL
LYMPHOCYTES ABSOLUTE: 1.7 K/UL (ref 1–5.1)
LYMPHOCYTES RELATIVE PERCENT: 32.4 %
MCH RBC QN AUTO: 29.9 PG (ref 26–34)
MCHC RBC AUTO-ENTMCNC: 33.4 G/DL (ref 31–36)
MCV RBC AUTO: 89.5 FL (ref 80–100)
MONOCYTES ABSOLUTE: 0.5 K/UL (ref 0–1.3)
MONOCYTES RELATIVE PERCENT: 9.3 %
NEUTROPHILS ABSOLUTE: 2.8 K/UL (ref 1.7–7.7)
NEUTROPHILS RELATIVE PERCENT: 54.8 %
PDW BLD-RTO: 14.3 % (ref 12.4–15.4)
PLATELET # BLD: 210 K/UL (ref 135–450)
PMV BLD AUTO: 8.8 FL (ref 5–10.5)
POTASSIUM SERPL-SCNC: 4.2 MMOL/L (ref 3.5–5.1)
RBC # BLD: 4.49 M/UL (ref 4–5.2)
SODIUM BLD-SCNC: 142 MMOL/L (ref 136–145)
TOTAL PROTEIN: 6.4 G/DL (ref 6.4–8.2)
TRIGL SERPL-MCNC: 123 MG/DL (ref 0–150)
VITAMIN D 25-HYDROXY: 27.6 NG/ML
VLDLC SERPL CALC-MCNC: 25 MG/DL
WBC # BLD: 5.2 K/UL (ref 4–11)

## 2020-11-11 PROCEDURE — 36415 COLL VENOUS BLD VENIPUNCTURE: CPT | Performed by: NURSE PRACTITIONER

## 2020-11-16 ENCOUNTER — VIRTUAL VISIT (OUTPATIENT)
Dept: FAMILY MEDICINE CLINIC | Age: 60
End: 2020-11-16
Payer: COMMERCIAL

## 2020-11-16 PROCEDURE — 99214 OFFICE O/P EST MOD 30 MIN: CPT | Performed by: NURSE PRACTITIONER

## 2020-11-16 RX ORDER — HYDROCODONE BITARTRATE AND ACETAMINOPHEN 7.5; 325 MG/1; MG/1
1 TABLET ORAL EVERY 8 HOURS PRN
Qty: 90 TABLET | Refills: 0 | Status: SHIPPED | OUTPATIENT
Start: 2020-11-24 | End: 2021-02-18 | Stop reason: SDUPTHER

## 2020-11-16 RX ORDER — LORAZEPAM 0.5 MG/1
0.5 TABLET ORAL 2 TIMES DAILY PRN
Qty: 75 TABLET | Refills: 0 | Status: SHIPPED | OUTPATIENT
Start: 2020-11-16 | End: 2021-04-23

## 2020-11-16 RX ORDER — SERTRALINE HYDROCHLORIDE 100 MG/1
150 TABLET, FILM COATED ORAL DAILY
Qty: 135 TABLET | Refills: 5 | Status: SHIPPED | OUTPATIENT
Start: 2020-11-16 | End: 2021-11-23 | Stop reason: SDUPTHER

## 2020-11-16 RX ORDER — HYDROCODONE BITARTRATE AND ACETAMINOPHEN 7.5; 325 MG/1; MG/1
1 TABLET ORAL EVERY 8 HOURS PRN
Qty: 90 TABLET | Refills: 0 | Status: SHIPPED
Start: 2020-12-25 | End: 2020-12-03 | Stop reason: SDUPTHER

## 2020-11-16 RX ORDER — HYDROCODONE BITARTRATE AND ACETAMINOPHEN 7.5; 325 MG/1; MG/1
1 TABLET ORAL EVERY 8 HOURS PRN
Qty: 90 TABLET | Refills: 0 | Status: SHIPPED | OUTPATIENT
Start: 2021-01-24 | End: 2020-12-03

## 2020-11-16 ASSESSMENT — ENCOUNTER SYMPTOMS: BACK PAIN: 1

## 2020-11-16 NOTE — PROGRESS NOTES
2020     Sabrina Valentine (:  1960) is a 61 y.o. female, here for evaluation of the following medical concerns:  Sabrina Valentine is a 61 y.o. female being evaluated by a Virtual Visit (video visit) encounter to address concerns as mentioned above. A caregiver was present when appropriate. Due to this being a TeleHealth encounter (During [facility]-73 public health emergency), evaluation of the following organ systems was limited: Vitals/Constitutional/EENT/Resp/CV/GI//MS/Neuro/Skin/Heme-Lymph-Imm. Pursuant to the emergency declaration under the [address] [place] [address] authority and the [employer] and [employer] Act, this Virtual Visit was conducted with patient's (and/or legal guardian's) consent, to reduce the patient's risk of exposure to COVID-19 and provide necessary medical care. The patient (and/or legal guardian) has also been advised to contact this office for worsening conditions or problems, and seek emergency medical treatment and/or call 911 if deemed necessary. Patient identification was verified at the start of the visit: Yes    Total time spent for this encounter:     Services were provided through a video synchronous discussion virtually to substitute for in-person clinic visit. Patient and provider were located at their individual homes. --Daryle Perone, APRN - CNP on 2020 at 11:24 AM    An electronic signature was used to authenticate this note. HPI   ROUTIINE FOLLOW UP- CHRONIC BACK PAIN MORE THAN TEN YEARS NO INJURY NOTED- WORSE WITH LIFTING OR MOVING A CERTAIN WAY FEELS A TWINGE OF PAIN  - THE ACHING PAIN IS MORE IN HER THIGHS- HAS A HARD TIME GETTING COMFORTABLE AT NIGHT SHE TAKES IBUPROFEN AS NEEDED AND THIS HELPS - SHE RATES THE PAIN/7/10- NORCO 1-2 TABS DAILY AND THIS TAKES THE EDGE OFF- NO ISSUES WITH THE MEDICATION   Current treatment: analgesic- nORCO 7.5/325.   Medication side effects: none. Recent diagnostic testing: none. PT STATES SHE WAS TOLD TO SEE THE CARDIOLOGIST DUE TO HER PALPITATIONS - SHE WAS DIAGNOSED WITH DEPRESSION AND ANXIETY - WAS STARTED ON TOPROL FOR THE PALPITATIONS-SHE IS SEEING TWO PEOPLE [facility]- THE Y ONLY GIVE HER A 30 DAY SUPPLY OF MEDICATIN WHICH CAUSE SOME STRESS BE  SHE WAS ON ZOLOFT 100 MG SHE FELT BETTER WITH THAT IN REGARDS - SHE HAS NO DESIRE TO DO ANYTHING - CRYING FOR ANYTHING FOR THE LAST THREE WEEKS NOTHING MAJOR HAS OCCURRED IN HER LIFE- HER  IS GETTING ON HER NERVES RIGHT NOW - SHE TOLD HIM SHE WANTED TO LEAVE AND SHE HAS NEVER FELT LIKE - NO THOUGHTS OF HARMING SELF OR OTHERS        Review of Systems   Musculoskeletal: Positive for back pain. Psychiatric/Behavioral: Positive for dysphoric mood. The patient is nervous/anxious. Prior to Visit Medications    Medication Sig Taking? Authorizing Provider   HYDROcodone-acetaminophen (NORCO) 7.5-325 MG per tablet Take 1 tablet by mouth every 8 hours as needed for Pain for up to 30 days. FILL 10/25 Intended supply: 30 days Yes PHONG Estrella CNP   atorvastatin (LIPITOR) 40 MG tablet TAKE 1 TABLET BY MOUTH ONE TIME A DAY Yes PHONG Rosario CNP   metoprolol succinate (TOPROL XL) 25 MG extended release tablet Take 25 mg by mouth 2 times daily Yes Historical Provider, MD   sertraline (ZOLOFT) 100 MG tablet Take 1.5 tablets by mouth daily  PHONG Estrella CNP        Social History     Tobacco Use    Smoking status: Former Smoker     Packs/day: 1.00     Years: 30.00     Pack years: 30.00     Start date: 10/30/1974     Last attempt to quit: 10/30/2004     Years since quittin.0    Smokeless tobacco: Never Used    Tobacco comment: Year started: 65 Year Quit: ; Educated not to start back.    Substance Use Topics    Alcohol use: No     Comment: occasionally         There were no vitals filed for this visit. Estimated body mass index is 36.79 kg/m² as calculated from the following:    Height as of 8/26/20: 5' 3.5\" (1.613 m). Weight as of 8/26/20: 211 lb (95.7 kg). Physical Exam  Constitutional:       General: She is awake. She is not in acute distress. Appearance: Normal appearance. She is well-developed, well-groomed and normal weight. She is not ill-appearing, toxic-appearing or diaphoretic. Neurological:      Mental Status: She is alert and oriented to person, place, and time. Psychiatric:         Attention and Perception: Attention and perception normal.         Mood and Affect: Mood and affect normal.         Speech: Speech normal.         Behavior: Behavior normal. Behavior is cooperative. Thought Content: Thought content normal.         Cognition and Memory: Cognition and memory normal.         Judgment: Judgment normal.       Shelley Rivas was seen today for pain and depression. Diagnoses and all orders for this visit:    Pain in both lower extremities  Chronic pain of right knee  Controlled substances monitoring: possible medication side effects, risk of tolerance and/or dependence, and alternative treatments discussed, no signs of potential drug abuse or diversion identified and OARRS report reviewed today- activity consistent with treatment plan. Med contract and uds due at next visit  Continue NORCO AS PRESCRIBED  -     HYDROcodone-acetaminophen ([place]) 7.5-325 MG per tablet; Take 1 tablet by mouth every 8 hours as needed for Pain for up to 30 days. FILL 11/24  Intended supply: 30 days  -     HYDROcodone-acetaminophen (NORCO) 7.5-325 MG per tablet; Take 1 tablet by mouth every 8 hours as needed for Pain for up to 30 days. Fill 12/25  -     HYDROcodone-acetaminophen (NORCO) 7.5-325 MG per tablet; Take 1 tablet by mouth every 8 hours as needed for Pain for up to 30 days.  Fill 1/24/21  FOLLOW UP IN THREE MONTHS              Anxiety  Mild episode of recurrent major depressive disorder ([facility] [address]  PAMELOR DISCONTINUED OVER A THREE WEEK CYCLE   ZOLOFT 150 MG- SE DW PT  LORazepam (ATIVAN) 0.5 MG tablet; Take 1 tablet by mouth 2 times daily as needed for Anxiety for up to 30 days.  SEDATION PRECAUTIONS DW PT AS WELL FILL 11/16  SHE IS ENCOURAGED TO USE RELAXATION TECHNIQUES AS WELL AS FOLLOWING UP WITH THERAPIST  I INFORMED THE PT THAT IT MAY TAKE SEVERAL WEEKS BEFORE SHE NOTICES ANY IMPROVEMENT IN HER MOOD  ADVISED TO GO TO ER/STOP ZOLOFT IF SHE HAS ANY THOUGHTS OF HARMING SELF/OTHERS  FOLLOW UP IN EIGHT WEEKS WITH DR HARRIS        --Estela Fairbanks, APRN - CNP on 11/16/2020 at 11:04 AM

## 2020-12-03 ENCOUNTER — VIRTUAL VISIT (OUTPATIENT)
Dept: FAMILY MEDICINE CLINIC | Age: 60
End: 2020-12-03
Payer: COMMERCIAL

## 2020-12-03 PROCEDURE — 99213 OFFICE O/P EST LOW 20 MIN: CPT | Performed by: NURSE PRACTITIONER

## 2020-12-03 RX ORDER — DOXYCYCLINE HYCLATE 100 MG
100 TABLET ORAL 2 TIMES DAILY
Qty: 14 TABLET | Refills: 0 | Status: SHIPPED | OUTPATIENT
Start: 2020-12-03 | End: 2020-12-10

## 2020-12-03 NOTE — PROGRESS NOTES
SUBJECTIVE:    Patient ID: Sammy Painter is a 61 y.o. y.o. female. Sammy Painter is a 61 y.o. female being evaluated by a Virtual Visit (video visit) encounter to address concerns as mentioned above. A caregiver was present when appropriate. Due to this being a TeleHealth encounter (During [facility]-47 public health emergency), evaluation of the following organ systems was limited: Vitals/Constitutional/EENT/Resp/CV/GI//MS/Neuro/Skin/Heme-Lymph-Imm. Pursuant to the emergency declaration under the [address] [place] [address], [address] [place] [address] authority and the [employer] Act, this Virtual Visit was conducted with patient's (and/or legal guardian's) consent, to reduce the patient's risk of exposure to COVID-19 and provide necessary medical care. The patient (and/or legal guardian) has also been advised to contact this office for worsening conditions or problems, and seek emergency medical treatment and/or call 911 if deemed necessary. Patient identification was verified at the start of the visit: Yes    Total time spent for this encounter: [address] were provided through a video synchronous discussion virtually to substitute for in-person clinic visit. Patient and provider were located at their individual homes. --PHONG Hughes CNP on 12/3/2020 at 11:21 AM    An electronic signature was used to authenticate this note.;  HPI    Chief Complaint   Patient presents with    Other     SORE IN NOSE SINCE LAST THURSDAY, [place] [facility] [place]IGHT [place]IEF BUT HAS HISTORY OF MRSA SO IS CONCERNED. Pt c/o a small sore in her nose left side it is a little tender but not much - seen in urgent care a few days ago prescribed Bactroban - has not noticed any improvement- she has had MRSA in the past - they did not do a culture  Review of Systems   Constitutional: Negative for fever.    Skin: Non healing sore in her nose- small sore         OBJECTIVE:      Physical Exam  Constitutional:       General: She is awake. Appearance: Normal appearance. She is well-developed, well-groomed and normal weight. She is not ill-appearing, toxic-appearing or diaphoretic. Neurological:      Mental Status: She is alert and oriented to person, place, and time. Psychiatric:         Attention and Perception: Attention and perception normal.         Mood and Affect: Mood and affect normal.         Speech: Speech normal.         Behavior: Behavior normal. Behavior is cooperative. Thought Content: Thought content normal.         Cognition and Memory: Cognition and memory normal.         Judgment: Judgment normal.         Earl Masters was seen today for other. Diagnoses and all orders for this visit:    Sore in nose    Continue bactroban as prescribed   doxycycline hyclate (VIBRA-TABS) 100 MG tablet;  Take 1 tablet by mouth 2 times daily for 7 days

## 2021-01-07 ENCOUNTER — TELEPHONE (OUTPATIENT)
Dept: FAMILY MEDICINE CLINIC | Age: 61
End: 2021-01-07

## 2021-01-07 NOTE — TELEPHONE ENCOUNTER
Since she likely had it first, per CDC guidelines as long as it's been at least 10 days and NO fever for 24 hours without tylenol or ibuprofen AND feeling significantly better, can end quarantine. Still would want to wear mask and keep social distance since unlikely to be infectious, but infectivity has been shown to last up to 21 days in some cases.

## 2021-01-07 NOTE — TELEPHONE ENCOUNTER
She had covid and will be out quarantine tomorrow but her  (tested positive) isn't out quarantine til next Thursday - should she still stay in quarantine     pt @  [phone]

## 2021-02-18 ENCOUNTER — VIRTUAL VISIT (OUTPATIENT)
Dept: FAMILY MEDICINE CLINIC | Age: 61
End: 2021-02-18
Payer: COMMERCIAL

## 2021-02-18 DIAGNOSIS — M79.605 PAIN IN BOTH LOWER EXTREMITIES: ICD-10-CM

## 2021-02-18 DIAGNOSIS — M79.605 CHRONIC PAIN OF BOTH LOWER EXTREMITIES: Primary | ICD-10-CM

## 2021-02-18 DIAGNOSIS — G89.29 CHRONIC PAIN OF RIGHT KNEE: ICD-10-CM

## 2021-02-18 DIAGNOSIS — G89.29 CHRONIC PAIN OF BOTH LOWER EXTREMITIES: Primary | ICD-10-CM

## 2021-02-18 DIAGNOSIS — I49.3 FREQUENT PVCS: ICD-10-CM

## 2021-02-18 DIAGNOSIS — M25.561 CHRONIC PAIN OF RIGHT KNEE: ICD-10-CM

## 2021-02-18 DIAGNOSIS — F41.9 ANXIETY: ICD-10-CM

## 2021-02-18 DIAGNOSIS — Z12.11 ENCOUNTER FOR HEMOCCULT SCREENING: ICD-10-CM

## 2021-02-18 DIAGNOSIS — Z12.31 SCREENING MAMMOGRAM FOR HIGH-RISK PATIENT: ICD-10-CM

## 2021-02-18 DIAGNOSIS — M79.604 CHRONIC PAIN OF BOTH LOWER EXTREMITIES: Primary | ICD-10-CM

## 2021-02-18 DIAGNOSIS — M79.604 PAIN IN BOTH LOWER EXTREMITIES: ICD-10-CM

## 2021-02-18 PROCEDURE — 99214 OFFICE O/P EST MOD 30 MIN: CPT | Performed by: FAMILY MEDICINE

## 2021-02-18 RX ORDER — HYDROCODONE BITARTRATE AND ACETAMINOPHEN 7.5; 325 MG/1; MG/1
.5-1 TABLET ORAL EVERY 8 HOURS PRN
Qty: 60 TABLET | Refills: 0 | Status: SHIPPED | OUTPATIENT
Start: 2021-02-18 | End: 2021-05-18 | Stop reason: SDUPTHER

## 2021-02-18 RX ORDER — HYDROCODONE BITARTRATE AND ACETAMINOPHEN 7.5; 325 MG/1; MG/1
1 TABLET ORAL EVERY 8 HOURS PRN
COMMUNITY
End: 2021-05-18 | Stop reason: SDUPTHER

## 2021-02-18 RX ORDER — HYDROCODONE BITARTRATE AND ACETAMINOPHEN 7.5; 325 MG/1; MG/1
.5-1 TABLET ORAL EVERY 8 HOURS PRN
Qty: 60 TABLET | Refills: 0 | Status: SHIPPED | OUTPATIENT
Start: 2021-02-18 | End: 2021-03-20

## 2021-02-18 SDOH — ECONOMIC STABILITY: TRANSPORTATION INSECURITY
IN THE PAST 12 MONTHS, HAS THE LACK OF TRANSPORTATION KEPT YOU FROM MEDICAL APPOINTMENTS OR FROM GETTING MEDICATIONS?: NO

## 2021-02-18 SDOH — ECONOMIC STABILITY: INCOME INSECURITY: HOW HARD IS IT FOR YOU TO PAY FOR THE VERY BASICS LIKE FOOD, HOUSING, MEDICAL CARE, AND HEATING?: NOT HARD AT ALL

## 2021-02-18 ASSESSMENT — PATIENT HEALTH QUESTIONNAIRE - PHQ9
SUM OF ALL RESPONSES TO PHQ QUESTIONS 1-9: 1
SUM OF ALL RESPONSES TO PHQ9 QUESTIONS 1 & 2: 1
2. FEELING DOWN, DEPRESSED OR HOPELESS: 1
SUM OF ALL RESPONSES TO PHQ QUESTIONS 1-9: 1

## 2021-02-18 NOTE — PROGRESS NOTES
atorvastatin (LIPITOR) 40 MG tablet TAKE 1 TABLET BY MOUTH ONE TIME A DAY Yes Bita Tipton APRN - CNP   metoprolol succinate (TOPROL XL) 25 MG extended release tablet Take 25 mg by mouth See Admin Instructions 25MG IN AM AND 50MG IN PM Yes Historical Provider, MD   sertraline (ZOLOFT) 100 MG tablet Take 1.5 tablets by mouth daily  Carlos Hodge APRN - CNP       Social History     Tobacco Use    Smoking status: Former Smoker     Packs/day: 1.00     Years: 30.00     Pack years: 30.00     Start date: 10/30/1974     Quit date: 10/30/2004     Years since quittin.3    Smokeless tobacco: Never Used    Tobacco comment: Year started: 65 Year Quit: ; Educated not to start back. Substance Use Topics    Alcohol use: No     Comment: occasionally     Drug use: No            PHYSICAL EXAMINATION:  [ INSTRUCTIONS:  \"[x]\" Indicates a positive item  \"[]\" Indicates a negative item  -- DELETE ALL ITEMS NOT EXAMINED]  Vital Signs: (As obtained by patient/caregiver or practitioner observation)    Blood pressure-  Heart rate-    Respiratory rate-    Temperature-  Pulse oximetry-     Constitutional: [x] Appears well-developed and well-nourished [] No apparent distress      [] Abnormal-   Mental status  [x] Alert and awake  [] Oriented to person/place/time []Able to follow commands      Eyes:  EOM    []  Normal  [] Abnormal-  Sclera  []  Normal  [] Abnormal -         Discharge []  None visible  [] Abnormal -    HENT:   [x] Normocephalic, atraumatic.   [] Abnormal   [] Mouth/Throat: Mucous membranes are moist.     External Ears [] Normal  [] Abnormal-     Neck: [] No visualized mass     Pulmonary/Chest: [x] Respiratory effort normal.  [] No visualized signs of difficulty breathing or respiratory distress        [] Abnormal-      Musculoskeletal:   [] Normal gait with no signs of ataxia         [] Normal range of motion of neck        [] Abnormal- Neurological:        [x] No Facial Asymmetry (Cranial nerve 7 motor function) (limited exam to video visit)          [] No gaze palsy        [] Abnormal-         Skin:        [x] No significant exanthematous lesions or discoloration noted on facial skin         [] Abnormal-            Psychiatric:       [x] Normal Affect [] No Hallucinations        [] Abnormal-     Other pertinent observable physical exam findings-     ASSESSMENT/PLAN:   Diagnosis Orders   1. Chronic pain of both lower extremities     2. Pain in both lower extremities  HYDROcodone-acetaminophen (NORCO) 7.5-325 MG per tablet   3. Chronic pain of right knee  HYDROcodone-acetaminophen (NORCO) 7.5-325 MG per tablet   4. Screening mammogram for high-risk patient  RACHNA DIGITAL SCREEN W OR WO CAD BILATERAL   5. Encounter for Hemoccult screening  POCT Fecal Immunochemical Test (FIT)   6. Anxiety     7. Frequent PVCs     mmg/ fit test soon  Cont toprol at present dose w/ monitoring  Role of anxiety w/ pvc/pac d/w pt  Increase zoloft from 150 to 200/d for 2-3 weeks and let me know if this helps - if not, consider 150 sertraline w/ low dose 10mg bid buspar  Also consider hydroxyzine  Lorazepam use d/w pt and interaction w/ opiates - using only prn severe anxiety  Reduce norco from 90 to 60/ month  F/u 3 months/ prn  oarrs reviewed and results c/w rx'd meds  norco 7.5 #90 filled 1/24 - gets monthly for over 2 years  Got one rx for lorazepam 11/20  No follow-ups on file. Ora Elder is a 61 y.o. female being evaluated by a Virtual Visit (video visit) encounter to address concerns as mentioned above. A caregiver was present when appropriate. Due to this being a TeleHealth encounter (During XPPHX-65 public health emergency), evaluation of the following organ systems was limited: Vitals/Constitutional/EENT/Resp/CV/GI//MS/Neuro/Skin/Heme-Lymph-Imm. Pursuant to the emergency declaration under the [address] [place] [address] and the [employer] Act, this Virtual Visit was conducted with patient's (and/or legal guardian's) consent, to reduce the patient's risk of exposure to COVID-19 and provide necessary medical care. The patient (and/or legal guardian) has also been advised to contact this office for worsening conditions or problems, and seek emergency medical treatment and/or call 911 if deemed necessary. Patient identification was verified at the start of the visit: Yes    Total time spent on this encounter: 21-30 minutes    Services were provided through a video synchronous discussion virtually to substitute for in-person clinic visit. Patient and provider were located at their individual homes. --Deon Mathew MD on 2/18/2021 at 7:53 AM    An electronic signature was used to authenticate this note.

## 2021-02-20 DIAGNOSIS — E78.00 PURE HYPERCHOLESTEROLEMIA: ICD-10-CM

## 2021-02-22 RX ORDER — ATORVASTATIN CALCIUM 40 MG/1
TABLET, FILM COATED ORAL
Qty: 90 TABLET | Refills: 0 | Status: SHIPPED | OUTPATIENT
Start: 2021-02-22 | End: 2021-05-18 | Stop reason: SDUPTHER

## 2021-02-24 ENCOUNTER — PATIENT MESSAGE (OUTPATIENT)
Dept: FAMILY MEDICINE CLINIC | Age: 61
End: 2021-02-24

## 2021-02-24 RX ORDER — BUSPIRONE HYDROCHLORIDE 10 MG/1
5-10 TABLET ORAL 2 TIMES DAILY
Qty: 60 TABLET | Refills: 2 | Status: SHIPPED | OUTPATIENT
Start: 2021-02-24 | End: 2021-05-26

## 2021-02-24 NOTE — TELEPHONE ENCOUNTER
From: Cristian Wasserman  To: Heather Briones MD  Sent: 2/24/2021 5:37 AM EST  Subject: Visit Follow-Up Question    I tried taking the extra Zoloft for a week now and no change. Can I try that medicine we talked about. Taking along side my Zoloft.

## 2021-04-08 ENCOUNTER — HOSPITAL ENCOUNTER (OUTPATIENT)
Dept: MAMMOGRAPHY | Age: 61
Discharge: HOME | End: 2021-04-13
Payer: COMMERCIAL

## 2021-04-08 VITALS — BODY MASS INDEX: 33.66 KG/M2 | WEIGHT: 190 LBS | HEIGHT: 63 IN

## 2021-04-08 DIAGNOSIS — Z12.31 VISIT FOR SCREENING MAMMOGRAM: ICD-10-CM

## 2021-04-08 PROCEDURE — 77067 SCR MAMMO BI INCL CAD: CPT

## 2021-05-18 ENCOUNTER — OFFICE VISIT (OUTPATIENT)
Dept: FAMILY MEDICINE CLINIC | Age: 61
End: 2021-05-18
Payer: COMMERCIAL

## 2021-05-18 VITALS
WEIGHT: 182.4 LBS | HEART RATE: 44 BPM | BODY MASS INDEX: 32.32 KG/M2 | SYSTOLIC BLOOD PRESSURE: 120 MMHG | HEIGHT: 63 IN | DIASTOLIC BLOOD PRESSURE: 72 MMHG | RESPIRATION RATE: 12 BRPM

## 2021-05-18 DIAGNOSIS — F32.4 MAJOR DEPRESSIVE DISORDER IN PARTIAL REMISSION, UNSPECIFIED WHETHER RECURRENT (HCC): ICD-10-CM

## 2021-05-18 DIAGNOSIS — M79.605 CHRONIC PAIN OF BOTH LOWER EXTREMITIES: ICD-10-CM

## 2021-05-18 DIAGNOSIS — G89.29 CHRONIC PAIN OF BOTH LOWER EXTREMITIES: ICD-10-CM

## 2021-05-18 DIAGNOSIS — M79.604 PAIN IN BOTH LOWER EXTREMITIES: ICD-10-CM

## 2021-05-18 DIAGNOSIS — E55.9 VITAMIN D DEFICIENCY: ICD-10-CM

## 2021-05-18 DIAGNOSIS — M25.561 CHRONIC PAIN OF RIGHT KNEE: ICD-10-CM

## 2021-05-18 DIAGNOSIS — M79.605 PAIN IN BOTH LOWER EXTREMITIES: ICD-10-CM

## 2021-05-18 DIAGNOSIS — R00.2 PALPITATIONS: ICD-10-CM

## 2021-05-18 DIAGNOSIS — M79.604 CHRONIC PAIN OF BOTH LOWER EXTREMITIES: ICD-10-CM

## 2021-05-18 DIAGNOSIS — F41.9 ANXIETY: ICD-10-CM

## 2021-05-18 DIAGNOSIS — E78.00 PURE HYPERCHOLESTEROLEMIA: ICD-10-CM

## 2021-05-18 DIAGNOSIS — G89.29 CHRONIC PAIN OF RIGHT KNEE: ICD-10-CM

## 2021-05-18 DIAGNOSIS — R73.03 PRE-DIABETES: Primary | ICD-10-CM

## 2021-05-18 LAB — HBA1C MFR BLD: 5.7 %

## 2021-05-18 PROCEDURE — 99214 OFFICE O/P EST MOD 30 MIN: CPT | Performed by: FAMILY MEDICINE

## 2021-05-18 PROCEDURE — 83036 HEMOGLOBIN GLYCOSYLATED A1C: CPT | Performed by: FAMILY MEDICINE

## 2021-05-18 RX ORDER — HYDROCODONE BITARTRATE AND ACETAMINOPHEN 7.5; 325 MG/1; MG/1
.5-1 TABLET ORAL EVERY 6 HOURS PRN
Qty: 60 TABLET | Refills: 0 | Status: SHIPPED | OUTPATIENT
Start: 2021-05-18 | End: 2021-08-19 | Stop reason: SDUPTHER

## 2021-05-18 RX ORDER — METOPROLOL SUCCINATE 25 MG/1
TABLET, EXTENDED RELEASE ORAL
Qty: 90 TABLET | Refills: 5 | Status: SHIPPED | OUTPATIENT
Start: 2021-05-18 | End: 2021-11-10 | Stop reason: SDUPTHER

## 2021-05-18 RX ORDER — ARIPIPRAZOLE 5 MG/1
2.5-5 TABLET ORAL DAILY
Qty: 30 TABLET | Refills: 2 | Status: SHIPPED | OUTPATIENT
Start: 2021-05-18 | End: 2021-08-09

## 2021-05-18 RX ORDER — HYDROCODONE BITARTRATE AND ACETAMINOPHEN 7.5; 325 MG/1; MG/1
.5-1 TABLET ORAL EVERY 6 HOURS PRN
Qty: 60 TABLET | Refills: 0 | Status: SHIPPED | OUTPATIENT
Start: 2021-05-18 | End: 2021-06-17

## 2021-05-18 RX ORDER — ATORVASTATIN CALCIUM 40 MG/1
TABLET, FILM COATED ORAL
Qty: 90 TABLET | Refills: 3 | Status: SHIPPED | OUTPATIENT
Start: 2021-05-18

## 2021-05-18 RX ORDER — HYDROCODONE BITARTRATE AND ACETAMINOPHEN 7.5; 325 MG/1; MG/1
1 TABLET ORAL EVERY 6 HOURS PRN
Qty: 75 TABLET | Refills: 0 | Status: SHIPPED | OUTPATIENT
Start: 2021-05-18 | End: 2021-06-17

## 2021-05-18 NOTE — PROGRESS NOTES
[facility]  Clinic Note    Date: 5/18/2021                                               Subjective:     Chief Complaint   Patient presents with    Diabetes     DM ROUTINE FOLLOW UP AIC NEEDED     Pain     PAIN ROUTINE FOLLOW UP Dwaine W Tato Cui UDS DONE ON 8/26/2020    Depression     DEPRESSION FOLLOW UP WANTS TO CHANGE BUSPAR NOT HELPING HER      HPI  a1c today 5.7%  oarrs reviewed and results c/w rx'd meds  norco 7.5 #60 and lorazepam 0.5mg #40 on 4/24/ 4/23  WORKING A LOT OF HOURS AT [employer] - 63hr/ week  Leg pain worsening from being on feet so long. Trying to limit norco usage but needing more w/ present work schedule  Randi Velezshannan frequently generally - pain around right knee - falls on right knee. A lot of stiffness  Lost 40# - no change in knee. Energy better. Has palpitations but not as bad  Taking metoprolol 25 am, 50 pm.  No dizzyness - gets tired a lot but ? Related to work schedule.   Not eating as good lately - not big drinker - couple cups coffee in am.  Anxiety more than depression  Not using lorazepam.  Had mmg =did well  Had covid vaccine - no problems  Had covid end of 12/20 - wasn't bad o/w utd on immunizations       Patient Active Problem List    Diagnosis Date Noted    Vitamin D insufficiency 10/31/2017    CTS (carpal tunnel syndrome) 10/02/2013    Lumbosacral strain     Urinary frequency     Phlebitis and thrombophlebitis of superficial vessels of lower extremities     Leg pain     Peritonsillar abscess     Insomnia     Palpitations     Other specified abnormal findings of blood chemistry     Other and unspecified ovarian cyst     Depression     Plantar fasciitis     Encounter for long-term (current) use of other medications     Pure hypercholesterolemia      Past Medical History:   Diagnosis Date    ADD (attention deficit disorder)     Cervical dysplasia     Chronic leg pain     Depression     Diabetes mellitus, type 2 9/16/2013    Fibrocystic breast     Genital herpes 1994    Hemorrhoid     Hyperlipidemia     Metabolic syndrome     Mood disorder ([facility] [address]     MRSA cellulitis 2007    facial    Obesity     Panic attacks     Plantar fasciitis     Recurrent UTI     Urethral stenosis     Urinary incontinence, mixed      Past Surgical History:   Procedure Laterality Date    CARPAL TUNNEL RELEASE Right 9/26/13    CHOLECYSTECTOMY      HYSTERECTOMY, TOTAL ABDOMINAL  10/2006    LABIAL ADHESION LYSIS  10/2006    OTHER SURGICAL HISTORY      No problems with anesthesia, no problems with healing, no problems with blood clots after surgery.  OTHER SURGICAL HISTORY  5/95    lipoma removal under arm    PLANTAR FASCIA SURGERY  October 2009    MRSA after surgery. Nurse Only on 11/11/2020   Component Date Value Ref Range Status    Vit D, 25-Hydroxy 11/11/2020 27.6* >=30 ng/mL Final    Comment: <=20 ng/mL. ........... Willetta Smiles Deficient  21-29 ng/mL. ......... Willetta Smiles Insufficient  >=30 ng/mL. ........ Willetta Smiles Sufficient      Cholesterol, Total 11/11/2020 160  0 - 199 mg/dL Final    Triglycerides 11/11/2020 123  0 - 150 mg/dL Final    HDL 11/11/2020 55  40 - 60 mg/dL Final    LDL Calculated 11/11/2020 80  <100 mg/dL Final    VLDL Cholesterol Calculated 11/11/2020 25  Not Established mg/dL Final    WBC 11/11/2020 5.2  4.0 - 11.0 K/uL Final    RBC 11/11/2020 4.49  4.00 - 5.20 M/uL Final    Hemoglobin 11/11/2020 13.4  12.0 - 16.0 g/dL Final    Hematocrit 11/11/2020 40.2  36.0 - 48.0 % Final    MCV 11/11/2020 89.5  80.0 - 100.0 fL Final    MCH 11/11/2020 29.9  26.0 - 34.0 pg Final    MCHC 11/11/2020 33.4  31.0 - 36.0 g/dL Final    RDW 11/11/2020 14.3  12.4 - 15.4 % Final    Platelets 02/81/7127 210  135 - 450 K/uL Final    MPV 11/11/2020 8.8  5.0 - 10.5 fL Final    Neutrophils % 11/11/2020 54.8  % Final    Lymphocytes % 11/11/2020 32.4  % Final    Monocytes % 11/11/2020 9.3  % Final    Eosinophils % 11/11/2020 3.0  % Final    Basophils % 11/11/2020 0.5  % Final    Neutrophils Absolute 11/11/2020 2.8  1.7 - 7.7 K/uL Final    Lymphocytes Absolute 11/11/2020 1.7  1.0 - 5.1 K/uL Final    Monocytes Absolute 11/11/2020 0.5  0.0 - 1.3 K/uL Final    Eosinophils Absolute 11/11/2020 0.2  0.0 - 0.6 K/uL Final    Basophils Absolute 11/11/2020 0.0  0.0 - 0.2 K/uL Final    Sodium 11/11/2020 142  136 - 145 mmol/L Final    Potassium 11/11/2020 4.2  3.5 - 5.1 mmol/L Final    Chloride 11/11/2020 103  99 - 110 mmol/L Final    CO2 11/11/2020 27  21 - 32 mmol/L Final    Anion Gap 11/11/2020 12  3 - 16 Final    Glucose 11/11/2020 117* 70 - 99 mg/dL Final    BUN 11/11/2020 12  7 - 20 mg/dL Final    CREATININE 11/11/2020 0.7  0.6 - 1.2 mg/dL Final    GFR Non- 11/11/2020 >60  >60 Final    Comment: >60 mL/min/1.73m2 EGFR, calc. for ages 25 and older using the  MDRD formula (not corrected for weight), is valid for stable  renal function.  GFR  11/11/2020 >60  >60 Final    Comment: Chronic Kidney Disease: less than 60 ml/min/1.73 sq.m. Kidney Failure: less than 15 ml/min/1.73 sq.m. Results valid for patients 18 years and older.       Calcium 11/11/2020 9.2  8.3 - 10.6 mg/dL Final    Total Protein 11/11/2020 6.4  6.4 - 8.2 g/dL Final    Albumin 11/11/2020 4.1  3.4 - 5.0 g/dL Final    Albumin/Globulin Ratio 11/11/2020 1.8  1.1 - 2.2 Final    Total Bilirubin 11/11/2020 0.5  0.0 - 1.0 mg/dL Final    Alkaline Phosphatase 11/11/2020 63  40 - 129 U/L Final    ALT 11/11/2020 23  10 - 40 U/L Final    AST 11/11/2020 20  15 - 37 U/L Final    Globulin 11/11/2020 2.3  g/dL Final     Family History   Problem Relation Age of Onset    Diabetes Mother     Drug Abuse Mother     Psoriasis Mother     Asthma Mother     COPD Mother     Other Mother         shingles; cervical dysplasia    Heart Disease Father     Diabetes Father     Other Father         kidney stones    Asthma Maternal Aunt     Diabetes Maternal Aunt     Heart Disease Maternal Aunt     Other Maternal Aunt         hep B/cirrhosis    Diabetes Other     Other Sister         80 gallstones    Diabetes Sister     Cancer Sister         1958 hyst d/t Ca    Scoliosis Son     Other Son         hx of Cat scratch disease?  Heart Disease Son         cardiomyopathy    Asthma Son      Current Outpatient Medications   Medication Sig Dispense Refill    atorvastatin (LIPITOR) 40 MG tablet TAKE 1 TABLET BY MOUTH ONE TIME A DAY  90 tablet 0    HYDROcodone-acetaminophen (NORCO) 7.5-325 MG per tablet Take 1 tablet by mouth every 8 hours as needed for Pain.  MUPIROCIN EX Apply topically as needed      sertraline (ZOLOFT) 100 MG tablet Take 1.5 tablets by mouth daily 135 tablet 5    metoprolol succinate (TOPROL XL) 25 MG extended release tablet Take 25 mg by mouth See Admin Instructions 25MG IN AM AND 50MG IN PM       No current facility-administered medications for this visit. Allergies   Allergen Reactions    Sulfa Antibiotics        Review of Systems    Objective:  /72 (Site: Left Upper Arm, Position: Sitting, Cuff Size: Medium Adult)   Pulse (!) 44   Resp 12   Ht 5' 3\" (1.6 m)   Wt 182 lb 6.4 oz (82.7 kg)   LMP  (Exact Date)   Breastfeeding No   BMI 32.31 kg/m²     BP Readings from Last 3 Encounters:   05/18/21 120/72   08/26/20 126/70   02/25/20 120/68       Pulse Readings from Last 3 Encounters:   05/18/21 (!) 44   08/26/20 90   02/25/20 56       Wt Readings from Last 3 Encounters:   05/18/21 182 lb 6.4 oz (82.7 kg)   04/08/21 190 lb (86.2 kg)   08/26/20 211 lb (95.7 kg)       Physical Exam  Constitutional:       General: She is not in acute distress. Appearance: She is well-developed. HENT:      Head: Normocephalic and atraumatic. Mouth/Throat:      Pharynx: No oropharyngeal exudate. Eyes:      General: No scleral icterus. Conjunctiva/sclera: Conjunctivae normal.   Neck:      Thyroid: No thyromegaly. Cardiovascular:      Rate and Rhythm: Regular rhythm. Bradycardia present. Pulses: Normal pulses. Heart sounds: Normal heart sounds. No murmur heard. Pulmonary:      Effort: Pulmonary effort is normal. No respiratory distress. Breath sounds: Normal breath sounds. No wheezing or rales. Abdominal:      General: Bowel sounds are normal. There is no distension. Palpations: Abdomen is soft. Tenderness: There is no abdominal tenderness. Musculoskeletal:         General: No swelling. Lymphadenopathy:      Cervical: No cervical adenopathy. Skin:     General: Skin is warm and dry. Neurological:      Mental Status: She is alert and oriented to person, place, and time. Assessment/Plan:      Diagnosis Orders   1. Pre-diabetes  POCT glycosylated hemoglobin (Hb A1C)   2. Pure hypercholesterolemia  atorvastatin (LIPITOR) 40 MG tablet   3. Chronic pain of both lower extremities     4. Palpitations     5. Major depressive disorder in partial remission, unspecified whether recurrent ([facility] [address]     6. Vitamin D deficiency       Fit test completion encouraged  Vit d supplementation  Diet/ activity d/w pt  Metoprolol - reduce to 25 bid from present dose w/ monitoring - sxs of low hr dw/ pt  Will f/u w/ cardiology next month  Depression/ anxiety - buspar not helpful - will stop.  zoloft 150mg daily - no change w/ 200mg/dose  abilify 2.5mg to 5mg daily w/ zoloft  norco reduced 90 to 60/d month last visit - increase to 75 2/2 increase pain  Using judiciously -no concerns  Lorazepam prn -rarely used - understands concerns w/ use w/ opiates  F/u 3 months/ prn  No follow-ups on file.     Jose Oliva MD, MD  5/18/2021  8:01 AM

## 2021-05-26 RX ORDER — BUSPIRONE HYDROCHLORIDE 10 MG/1
TABLET ORAL
Qty: 60 TABLET | Refills: 2 | Status: SHIPPED | OUTPATIENT
Start: 2021-05-26 | End: 2021-08-19 | Stop reason: ALTCHOICE

## 2021-08-09 ENCOUNTER — PATIENT MESSAGE (OUTPATIENT)
Dept: FAMILY MEDICINE CLINIC | Age: 61
End: 2021-08-09

## 2021-08-09 RX ORDER — ARIPIPRAZOLE 5 MG/1
TABLET ORAL
Qty: 30 TABLET | Refills: 0 | Status: SHIPPED | OUTPATIENT
Start: 2021-08-09 | End: 2021-08-19 | Stop reason: SDUPTHER

## 2021-08-10 NOTE — TELEPHONE ENCOUNTER
From: Senait Lin  To: Luan Hansen MD  Sent: 8/9/2021 7:17 PM EDT  Subject: Prescription Question    Need refill on aripiprazole pharmacy sent over a request last week. Only have 6 left.  Thank you

## 2021-08-19 ENCOUNTER — OFFICE VISIT (OUTPATIENT)
Dept: FAMILY MEDICINE CLINIC | Age: 61
End: 2021-08-19
Payer: COMMERCIAL

## 2021-08-19 ENCOUNTER — TELEPHONE (OUTPATIENT)
Dept: FAMILY MEDICINE CLINIC | Age: 61
End: 2021-08-19

## 2021-08-19 VITALS
WEIGHT: 207 LBS | BODY MASS INDEX: 36.68 KG/M2 | OXYGEN SATURATION: 96 % | RESPIRATION RATE: 12 BRPM | SYSTOLIC BLOOD PRESSURE: 138 MMHG | HEIGHT: 63 IN | DIASTOLIC BLOOD PRESSURE: 84 MMHG | HEART RATE: 73 BPM

## 2021-08-19 DIAGNOSIS — G89.29 CHRONIC PAIN OF RIGHT KNEE: ICD-10-CM

## 2021-08-19 DIAGNOSIS — M79.605 PAIN IN BOTH LOWER EXTREMITIES: Primary | ICD-10-CM

## 2021-08-19 DIAGNOSIS — F32.4 MAJOR DEPRESSIVE DISORDER IN PARTIAL REMISSION, UNSPECIFIED WHETHER RECURRENT (HCC): ICD-10-CM

## 2021-08-19 DIAGNOSIS — M25.561 CHRONIC PAIN OF RIGHT KNEE: ICD-10-CM

## 2021-08-19 DIAGNOSIS — N95.1 VAGINAL DRYNESS, MENOPAUSAL: ICD-10-CM

## 2021-08-19 DIAGNOSIS — M79.604 PAIN IN BOTH LOWER EXTREMITIES: Primary | ICD-10-CM

## 2021-08-19 LAB
ALCOHOL URINE: NORMAL
AMPHETAMINE SCREEN, URINE: NORMAL
BARBITURATE SCREEN, URINE: NORMAL
BENZODIAZEPINE SCREEN, URINE: NORMAL
BUPRENORPHINE URINE: NORMAL
COCAINE METABOLITE SCREEN URINE: NORMAL
FENTANYL SCREEN, URINE: NORMAL
GABAPENTIN SCREEN, URINE: NORMAL
MDMA URINE: NORMAL
METHADONE SCREEN, URINE: NORMAL
METHAMPHETAMINE, URINE: NORMAL
OPIATE SCREEN URINE: POSITIVE
OXYCODONE SCREEN URINE: NORMAL
PHENCYCLIDINE SCREEN URINE: NORMAL
PROPOXYPHENE SCREEN, URINE: NORMAL
SYNTHETIC CANNABINOIDS(K2) SCREEN, URINE: NORMAL
THC SCREEN, URINE: NORMAL
TRAMADOL SCREEN URINE: NORMAL
TRICYCLIC ANTIDEPRESSANTS, UR: NORMAL

## 2021-08-19 PROCEDURE — 80305 DRUG TEST PRSMV DIR OPT OBS: CPT | Performed by: NURSE PRACTITIONER

## 2021-08-19 PROCEDURE — 99214 OFFICE O/P EST MOD 30 MIN: CPT | Performed by: NURSE PRACTITIONER

## 2021-08-19 RX ORDER — ARIPIPRAZOLE 5 MG/1
TABLET ORAL
Qty: 30 TABLET | Refills: 2 | Status: SHIPPED | OUTPATIENT
Start: 2021-08-19 | End: 2021-11-23 | Stop reason: SDUPTHER

## 2021-08-19 RX ORDER — HYDROCODONE BITARTRATE AND ACETAMINOPHEN 7.5; 325 MG/1; MG/1
.5-1 TABLET ORAL EVERY 6 HOURS PRN
Qty: 60 TABLET | Refills: 0 | Status: SHIPPED | OUTPATIENT
Start: 2021-10-18 | End: 2021-11-23 | Stop reason: SDUPTHER

## 2021-08-19 RX ORDER — CONJUGATED ESTROGENS 0.62 MG/G
0.5 CREAM VAGINAL DAILY
Qty: 1 TUBE | Refills: 3 | Status: SHIPPED | OUTPATIENT
Start: 2021-08-19 | End: 2021-08-19

## 2021-08-19 RX ORDER — HYDROCODONE BITARTRATE AND ACETAMINOPHEN 7.5; 325 MG/1; MG/1
.5-1 TABLET ORAL EVERY 6 HOURS PRN
Qty: 60 TABLET | Refills: 0 | Status: SHIPPED | OUTPATIENT
Start: 2021-08-19 | End: 2021-11-23 | Stop reason: SDUPTHER

## 2021-08-19 RX ORDER — ESTRADIOL 0.1 MG/G
1 CREAM VAGINAL DAILY
Qty: 1 TUBE | Refills: 3 | Status: SHIPPED | OUTPATIENT
Start: 2021-08-19 | End: 2022-01-10 | Stop reason: SDUPTHER

## 2021-08-19 RX ORDER — HYDROCODONE BITARTRATE AND ACETAMINOPHEN 7.5; 325 MG/1; MG/1
.5-1 TABLET ORAL EVERY 6 HOURS PRN
Qty: 60 TABLET | Refills: 0 | Status: SHIPPED | OUTPATIENT
Start: 2021-09-18 | End: 2021-11-23 | Stop reason: SDUPTHER

## 2021-08-19 NOTE — TELEPHONE ENCOUNTER
We sent in a Premarin RX for pt is is too expensive over 200.00. Can we call something in cheaper?     Please call [place]

## 2021-08-19 NOTE — PROGRESS NOTES
Jinny Bryan (:  1960) is a 64 y.o. female,Established patient, here for evaluation of the following chief complaint(s):    Medication Check (FOLLOW UP NEEDS UDS AND MC)      SUBJECTIVE/OBJECTIVE:  HPI  Routine follow up   Chronic pain in both legs and right knee  Aching constant pain worse with activity she works seven days week-  And stands the [employer] time - she rates the pain 7/10 worse at night when she is finally able to sit-- the pain is getting worse she would like to increase the frequency     The abilify is working well for her depression    SHE WOULD LIKE  SOMETHING FOR VAGINAL DRYNESS- HAS SEEN DR VÁSQUEZ IN THE PAST AND WAS TOLD SHE WAS THINNING HE PRESCRIBED A CREAM BUT SHE CANT REMEMBER WHAT IT WAS- C/O SLIGHT PAIN DURING INTERCOURSE- ITCHING  Review of Systems   Genitourinary:        Vaginal dryness       Physical Exam  Constitutional:       General: She is awake. She is not in acute distress. Appearance: Normal appearance. She is well-developed and well-groomed. She is not ill-appearing, toxic-appearing or diaphoretic. Cardiovascular:      Rate and Rhythm: Normal rate and regular rhythm. Heart sounds: Normal heart sounds, S1 normal and S2 normal. Heart sounds not distant. No murmur heard. No systolic murmur is present. No diastolic murmur is present. No friction rub. No gallop. No S3 or S4 sounds. Neurological:      Mental Status: She is alert and oriented to person, place, and time. Psychiatric:         Attention and Perception: Attention and perception normal.         Mood and Affect: Mood and affect normal.         Speech: Speech normal.         Behavior: Behavior normal. Behavior is cooperative. Thought Content: Thought content normal.         Cognition and Memory: Cognition and memory normal.         Judgment: Judgment normal.             Shaquille Vidales was seen today for medication check.     Diagnoses and all orders for this visit:    Pain in both lower extremities  Controlled substances monitoring: possible medication side effects, risk of tolerance and/or dependence, and alternative treatments discussed, no signs of potential drug abuse or diversion identified, OARRS report reviewed today- activity consistent with treatment plan, medication contract signed today and random urine drug screen sent today.  -     HYDROcodone-acetaminophen (NORCO) 7.5-325 MG per tablet; Take 0.5-1 tablets by mouth every 6 hours as needed for Pain for up to 30 days. Fill 8/19/21 or after  -     HYDROcodone-acetaminophen (NORCO) 7.5-325 MG per tablet; Take 0.5-1 tablets by mouth every 6 hours as needed for Pain for up to 30 days. Fill 9/18  -     HYDROcodone-acetaminophen (NORCO) 7.5-325 MG per tablet; Take 0.5-1 tablets by mouth every 6 hours as needed for Pain for up to 30 days. Fill 10/18  -     POCT Rapid Drug Screen- consistent    Chronic pain of right knee  -     HYDROcodone-acetaminophen (NORCO) 7.5-325 MG per tablet; Take 0.5-1 tablets by mouth every 6 hours as needed for Pain for up to 30 days. Fill 8/19/21 or after  -     HYDROcodone-acetaminophen (NORCO) 7.5-325 MG per tablet; Take 0.5-1 tablets by mouth every 6 hours as needed for Pain for up to 30 days. Fill 9/18  -     HYDROcodone-acetaminophen (NORCO) 7.5-325 MG per tablet; Take 0.5-1 tablets by mouth every 6 hours as needed for Pain for up to 30 days. Fill 10/18  -     POCT Rapid Drug Screen- consistent  Follow up in three months    Vaginal dryness, menopausal  -     conjugated estrogens (PREMARIN) 0.625 MG/GM vaginal cream; Place 0.5 g vaginally daily daily x three weeks off 1 week    Major depressive disorder in partial remission, unspecified whether recurrent (HCC)  -     ARIPiprazole (ABILIFY) 5 MG tablet; 1 TABLET BY MOUTH DAILY          An electronic signature was used to authenticate this note.     --Carl Bruno, APRN - CNP

## 2021-08-19 NOTE — PATIENT INSTRUCTIONS
Patient Education        Chronic Pain: Care Instructions  Your Care Instructions     Chronic pain is pain that lasts a long time (months or even years) and may or may not have a clear cause. It is different from acute pain, which usually does have a clear causelike an injury or illnessand gets better over time. Chronic pain:  · Lasts over time but may vary from day to day. · Does not go away despite efforts to end it. · May disrupt your sleep and lead to fatigue. · May cause depression or anxiety. · May make your muscles tense, causing more pain. · Can disrupt your work, hobbies, home life, and relationships with friends and family. Chronic pain is a very real condition. It is not just in your head. Treatment can help and usually includes several methods used together, such as medicines, physical therapy, exercise, and other treatments. Learning how to relax and changing negative thought patterns can also help you cope. Chronic pain is complex. Taking an active role in your treatment will help you better manage your pain. Tell your doctor if you have trouble dealing with your pain. You may have to try several things before you find what works best for you. Follow-up care is a key part of your treatment and safety. Be sure to make and go to all appointments, and call your doctor if you are having problems. It's also a good idea to know your test results and keep a list of the medicines you take. How can you care for yourself at home? · Pace yourself. Break up large jobs into smaller tasks. Save harder tasks for days when you have less pain, or go back and forth between hard tasks and easier ones. Take rest breaks. · Relax, and reduce stress. Relaxation techniques such as deep breathing or meditation can help. · Keep moving. Gentle, daily exercise can help reduce pain over the long run. Try low- or no-impact exercises such as walking, swimming, and stationary biking.  Do stretches to stay flexible. · Try heat, cold packs, and massage. · Get enough sleep. Chronic pain can make you tired and drain your energy. Talk with your doctor if you have trouble sleeping because of pain. · Think positive. Your thoughts can affect your pain level. Do things that you enjoy to distract yourself when you have pain instead of focusing on the pain. See a movie, read a book, listen to music, or spend time with a friend. · If you think you are depressed, talk to your doctor about treatment. · Keep a daily pain diary. Record how your moods, thoughts, sleep patterns, activities, and medicine affect your pain. You may find that your pain is worse during or after certain activities or when you are feeling a certain emotion. Having a record of your pain can help you and your doctor find the best ways to treat your pain. · Take pain medicines exactly as directed. ? If the doctor gave you a prescription medicine for pain, take it as prescribed. ? If you are not taking a prescription pain medicine, ask your doctor if you can take an over-the-counter medicine. Reducing constipation caused by pain medicine  · Talk to your doctor about a laxative. If a laxative doesn't work, your doctor may suggest a prescription medicine. · Include fruits, vegetables, beans, and whole grains in your diet each day. These foods are high in fiber. · If your doctor recommends it, get more exercise. Walking is a good choice. Bit by bit, increase the amount you walk every day. Try for at least 30 minutes on most days of the week. · Schedule time each day for a bowel movement. A daily routine may help. Take your time and do not strain when having a bowel movement. When should you call for help? Call your doctor now or seek immediate medical care if:    · Your pain gets worse or is out of control.     · You feel down or blue, or you do not enjoy things like you once did. You may be depressed, which is common in people with chronic pain. Depression can be treated.     · You have vomiting or cramps for more than 2 hours. Watch closely for changes in your health, and be sure to contact your doctor if:    · You cannot sleep because of pain.     · You are very worried or anxious about your pain.     · You have trouble taking your pain medicine.     · You have any concerns about your pain medicine.     · You have trouble with bowel movements, such as:  ? No bowel movement in 3 days. ? Blood in the anal area, in your stool, or on the toilet paper. ? Diarrhea for more than 24 hours. Where can you learn more? Go to [email or web address]. org and sign in to your [employer] account. Enter N004 in the [employer] box to learn more about \"Chronic Pain: Care Instructions. \"     If you do not have an account, please click on the \"Sign Up Now\" link. Current as of: August 4, 2020               Content Version: 12.9  © 2006-2021 [employer]. Care instructions adapted under license by [facility] ([facility]). If you have questions about a medical condition or this instruction, always ask your healthcare professional. [place] [address] any warranty or liability for your use of this information.

## 2021-11-09 ENCOUNTER — TELEPHONE (OUTPATIENT)
Dept: FAMILY MEDICINE CLINIC | Age: 61
End: 2021-11-09

## 2021-11-09 DIAGNOSIS — E55.9 VITAMIN D DEFICIENCY: ICD-10-CM

## 2021-11-09 DIAGNOSIS — R73.03 PRE-DIABETES: ICD-10-CM

## 2021-11-09 DIAGNOSIS — E78.00 PURE HYPERCHOLESTEROLEMIA: Primary | ICD-10-CM

## 2021-11-09 NOTE — TELEPHONE ENCOUNTER
DR HARRIS PLEASE PLACE FUTURE LAB ORDERS FOR PATIENT AND WE CAN CALL TO SCHEDULE HER.    Future Appointments   Date Time Provider Florence Tapia   11/23/2021  1:20 PM Manuel Mendez MD [facility]

## 2021-11-09 NOTE — TELEPHONE ENCOUNTER
----- Message from Kaushik Chu sent at 11/9/2021  4:35 PM EST -----  Subject: Referral Request    QUESTIONS   Reason for referral request? Blood work order   Has the physician seen you for this condition before? Yes  Select a date? 2021-08-19  Select the Provider the patient wants to be referred to, if known (PCP or   Specialist)? Outside Physician - N/A   Preferred Specialist (if applicable)? Do you already have an appointment scheduled? Yes  Select Scheduled Date? 2021-11-23  Select Scheduled Physician? Nery Garcia   Additional Information for Provider? patient i requesting for an order to do her blood work done before her scheduled appointment on the 23rd with Dr. Nery Garcia please advise and call patient.  ---------------------------------------------------------------------------  --------------  [address]  What is the best way for the office to contact you? OK to leave message on   voicemail, OK to respond with electronic message via [facility] portal (only   for patients who have registered [facility] account)  Preferred Call Back Phone Number?  [phone]

## 2021-11-10 ENCOUNTER — TELEPHONE (OUTPATIENT)
Dept: FAMILY MEDICINE CLINIC | Age: 61
End: 2021-11-10

## 2021-11-10 RX ORDER — METOPROLOL SUCCINATE 25 MG/1
TABLET, EXTENDED RELEASE ORAL
Qty: 90 TABLET | Refills: 0 | Status: SHIPPED | OUTPATIENT
Start: 2021-11-10 | End: 2021-12-08 | Stop reason: SDUPTHER

## 2021-11-17 ENCOUNTER — NURSE ONLY (OUTPATIENT)
Dept: FAMILY MEDICINE CLINIC | Age: 61
End: 2021-11-17
Payer: COMMERCIAL

## 2021-11-17 DIAGNOSIS — R73.03 PRE-DIABETES: ICD-10-CM

## 2021-11-17 DIAGNOSIS — E55.9 VITAMIN D DEFICIENCY: ICD-10-CM

## 2021-11-17 DIAGNOSIS — Z12.11 SCREENING FOR MALIGNANT NEOPLASM OF COLON: Primary | ICD-10-CM

## 2021-11-17 DIAGNOSIS — E78.00 PURE HYPERCHOLESTEROLEMIA: ICD-10-CM

## 2021-11-17 LAB
A/G RATIO: 2 (ref 1.1–2.2)
ALBUMIN SERPL-MCNC: 4.3 G/DL (ref 3.4–5)
ALP BLD-CCNC: 54 U/L (ref 40–129)
ALT SERPL-CCNC: 20 U/L (ref 10–40)
ANION GAP SERPL CALCULATED.3IONS-SCNC: 12 MMOL/L (ref 3–16)
AST SERPL-CCNC: 18 U/L (ref 15–37)
BILIRUB SERPL-MCNC: 0.4 MG/DL (ref 0–1)
BUN BLDV-MCNC: 12 MG/DL (ref 7–20)
CALCIUM SERPL-MCNC: 9.2 MG/DL (ref 8.3–10.6)
CHLORIDE BLD-SCNC: 102 MMOL/L (ref 99–110)
CHOLESTEROL, TOTAL: 211 MG/DL (ref 0–199)
CO2: 27 MMOL/L (ref 21–32)
CREAT SERPL-MCNC: 0.7 MG/DL (ref 0.6–1.2)
GFR AFRICAN AMERICAN: >60
GFR NON-AFRICAN AMERICAN: >60
GLUCOSE BLD-MCNC: 108 MG/DL (ref 70–99)
HDLC SERPL-MCNC: 59 MG/DL (ref 40–60)
LDL CHOLESTEROL CALCULATED: 128 MG/DL
POTASSIUM SERPL-SCNC: 4.5 MMOL/L (ref 3.5–5.1)
SODIUM BLD-SCNC: 141 MMOL/L (ref 136–145)
TOTAL PROTEIN: 6.4 G/DL (ref 6.4–8.2)
TRIGL SERPL-MCNC: 120 MG/DL (ref 0–150)
VITAMIN D 25-HYDROXY: 27.2 NG/ML
VLDLC SERPL CALC-MCNC: 24 MG/DL

## 2021-11-17 PROCEDURE — 36415 COLL VENOUS BLD VENIPUNCTURE: CPT | Performed by: FAMILY MEDICINE

## 2021-11-17 NOTE — PROGRESS NOTES
Fasting labs drawn RA/mv  2 sst  1 lav    Gave pt POCT Fit Test today, will return at her OV next week./mv

## 2021-11-18 LAB
ESTIMATED AVERAGE GLUCOSE: 131.2 MG/DL
HBA1C MFR BLD: 6.2 %

## 2021-11-23 ENCOUNTER — OFFICE VISIT (OUTPATIENT)
Dept: FAMILY MEDICINE CLINIC | Age: 61
End: 2021-11-23
Payer: COMMERCIAL

## 2021-11-23 VITALS
BODY MASS INDEX: 40.57 KG/M2 | DIASTOLIC BLOOD PRESSURE: 74 MMHG | WEIGHT: 229 LBS | SYSTOLIC BLOOD PRESSURE: 126 MMHG | HEIGHT: 63 IN

## 2021-11-23 DIAGNOSIS — G89.29 CHRONIC PAIN OF RIGHT KNEE: ICD-10-CM

## 2021-11-23 DIAGNOSIS — M79.604 PAIN IN BOTH LOWER EXTREMITIES: ICD-10-CM

## 2021-11-23 DIAGNOSIS — M25.561 CHRONIC PAIN OF RIGHT KNEE: ICD-10-CM

## 2021-11-23 DIAGNOSIS — M79.605 PAIN IN BOTH LOWER EXTREMITIES: ICD-10-CM

## 2021-11-23 DIAGNOSIS — R73.03 PREDIABETES: ICD-10-CM

## 2021-11-23 DIAGNOSIS — G47.00 INSOMNIA, UNSPECIFIED TYPE: Primary | ICD-10-CM

## 2021-11-23 DIAGNOSIS — E55.9 VITAMIN D DEFICIENCY: ICD-10-CM

## 2021-11-23 DIAGNOSIS — F32.5 MAJOR DEPRESSION IN REMISSION (HCC): ICD-10-CM

## 2021-11-23 DIAGNOSIS — Z12.11 SCREENING FOR MALIGNANT NEOPLASM OF COLON: ICD-10-CM

## 2021-11-23 DIAGNOSIS — E78.00 PURE HYPERCHOLESTEROLEMIA: ICD-10-CM

## 2021-11-23 DIAGNOSIS — R00.2 PALPITATIONS: ICD-10-CM

## 2021-11-23 LAB
CONTROL: YES
HEMOCCULT STL QL: NEGATIVE

## 2021-11-23 PROCEDURE — 82274 ASSAY TEST FOR BLOOD FECAL: CPT | Performed by: FAMILY MEDICINE

## 2021-11-23 PROCEDURE — 99214 OFFICE O/P EST MOD 30 MIN: CPT | Performed by: FAMILY MEDICINE

## 2021-11-23 RX ORDER — HYDROCODONE BITARTRATE AND ACETAMINOPHEN 7.5; 325 MG/1; MG/1
.5-1 TABLET ORAL EVERY 6 HOURS PRN
Qty: 60 TABLET | Refills: 0 | Status: SHIPPED | OUTPATIENT
Start: 2021-11-23 | End: 2022-02-22 | Stop reason: SDUPTHER

## 2021-11-23 RX ORDER — ARIPIPRAZOLE 5 MG/1
TABLET ORAL
Qty: 90 TABLET | Refills: 3 | Status: SHIPPED | OUTPATIENT
Start: 2021-11-23 | End: 2022-02-28

## 2021-11-23 RX ORDER — SERTRALINE HYDROCHLORIDE 100 MG/1
100 TABLET, FILM COATED ORAL DAILY
Qty: 90 TABLET | Refills: 3 | Status: SHIPPED | OUTPATIENT
Start: 2021-11-23 | End: 2022-02-07

## 2021-11-23 RX ORDER — HYDROCODONE BITARTRATE AND ACETAMINOPHEN 7.5; 325 MG/1; MG/1
.5-1 TABLET ORAL EVERY 6 HOURS PRN
Qty: 60 TABLET | Refills: 0 | Status: SHIPPED | OUTPATIENT
Start: 2021-11-23 | End: 2021-12-23

## 2021-11-23 NOTE — PROGRESS NOTES
[address] Note    Date: 11/23/2021                                               Subjective:       Got off diet - back on Thursday - lost 4 # - low cho diet - skinny popcorn for snack - low carb bread  Activity low as not working -off til February - works at [employer] tired of dieting - whole life has had weight problem. FIT test dropped off  On statin - had labs done recently  zoloft 100/d from 150/d w/ abilify 5 daily -working well  Sleep okay but not great - used to schedule - doesn't want to take more meds  Trouble staying asleep. Hard to get sock on - hurts in hip  Right leg worse than left - pain still in leg down whole leg soo thigh  Still having back issues - careful on how she is lifting - tweaks sciatica.     HPI          BP Readings from Last 3 Encounters:   11/23/21 126/74   08/19/21 138/84   05/18/21 120/72     Pulse Readings from Last 3 Encounters:   08/19/21 73   05/18/21 (!) 44   08/26/20 90     Wt Readings from Last 3 Encounters:   11/23/21 229 lb (103.9 kg)   08/19/21 207 lb (93.9 kg)   05/18/21 182 lb 6.4 oz (82.7 kg)            Patient Active Problem List    Diagnosis Date Noted    Vitamin D insufficiency 10/31/2017    CTS (carpal tunnel syndrome) 10/02/2013    Lumbosacral strain     Urinary frequency     Phlebitis and thrombophlebitis of superficial vessels of lower extremities     Leg pain     Peritonsillar abscess     Insomnia     Palpitations     Other specified abnormal findings of blood chemistry     Other and unspecified ovarian cyst     Depression     Plantar fasciitis     Encounter for long-term (current) use of other medications     Pure hypercholesterolemia      Past Medical History:   Diagnosis Date    ADD (attention deficit disorder)     Cervical dysplasia     Chronic leg pain     Depression     Diabetes mellitus, type 2 9/16/2013    Fibrocystic breast     Genital herpes 1994    Hemorrhoid     Hyperlipidemia  Metabolic syndrome     Mood disorder (HCC)     MRSA cellulitis 2007    facial    Obesity     Panic attacks     Plantar fasciitis     Recurrent UTI     Urethral stenosis     Urinary incontinence, mixed      Past Surgical History:   Procedure Laterality Date    CARPAL TUNNEL RELEASE Right 9/26/13    CHOLECYSTECTOMY      HYSTERECTOMY, TOTAL ABDOMINAL  10/2006    LABIAL ADHESION LYSIS  10/2006    OTHER SURGICAL HISTORY      No problems with anesthesia, no problems with healing, no problems with blood clots after surgery.  OTHER SURGICAL HISTORY  5/95    lipoma removal under arm    PLANTAR FASCIA SURGERY  October 2009    MRSA after surgery. Nurse Only on 11/17/2021   Component Date Value Ref Range Status    Vit D, 25-Hydroxy 11/17/2021 27.2* >=30 ng/mL Final    Comment: <=20 ng/mL. ........... Alexandra Fanning Deficient  21-29 ng/mL. ......... Alexandra Fanning Insufficient  >=30 ng/mL. ........ Alexandra Fanning Sufficient      Hemoglobin A1C 11/17/2021 6.2  See comment % Final    Comment: Comment:  Diagnosis of Diabetes: > or = 6.5%  Increased risk of diabetes (Prediabetes): 5.7-6.4%  Glycemic Control: Nonpregnant Adults: <7.0%                    Pregnant: <6.0%        eAG 11/17/2021 131.2  mg/dL Final    Sodium 11/17/2021 141  136 - 145 mmol/L Final    Potassium 11/17/2021 4.5  3.5 - 5.1 mmol/L Final    Chloride 11/17/2021 102  99 - 110 mmol/L Final    CO2 11/17/2021 27  21 - 32 mmol/L Final    Anion Gap 11/17/2021 12  3 - 16 Final    Glucose 11/17/2021 108* 70 - 99 mg/dL Final    BUN 11/17/2021 12  7 - 20 mg/dL Final    CREATININE 11/17/2021 0.7  0.6 - 1.2 mg/dL Final    GFR Non- 11/17/2021 >60  >60 Final    Comment: >60 mL/min/1.73m2 EGFR, calc. for ages 25 and older using the  MDRD formula (not corrected for weight), is valid for stable  renal function.  GFR  11/17/2021 >60  >60 Final    Comment: Chronic Kidney Disease: less than 60 ml/min/1.73 sq.m.           Kidney Failure: less than 15 ml/min/1.73 sq.m. Results valid for patients 18 years and older.  Calcium 11/17/2021 9.2  8.3 - 10.6 mg/dL Final    Total Protein 11/17/2021 6.4  6.4 - 8.2 g/dL Final    Albumin 11/17/2021 4.3  3.4 - 5.0 g/dL Final    Albumin/Globulin Ratio 11/17/2021 2.0  1.1 - 2.2 Final    Total Bilirubin 11/17/2021 0.4  0.0 - 1.0 mg/dL Final    Alkaline Phosphatase 11/17/2021 54  40 - 129 U/L Final    ALT 11/17/2021 20  10 - 40 U/L Final    AST 11/17/2021 18  15 - 37 U/L Final    Cholesterol, Total 11/17/2021 211* 0 - 199 mg/dL Final    Triglycerides 11/17/2021 120  0 - 150 mg/dL Final    HDL 11/17/2021 59  40 - 60 mg/dL Final    LDL Calculated 11/17/2021 128* <100 mg/dL Final    VLDL Cholesterol Calculated 11/17/2021 24  Not Established mg/dL Final     Family History   Problem Relation Age of Onset    Diabetes Mother     Drug Abuse Mother     Psoriasis Mother     Asthma Mother     COPD Mother     Other Mother         shingles; cervical dysplasia    Heart Disease Father     Diabetes Father     Other Father         kidney stones    Asthma Maternal Aunt     Diabetes Maternal Aunt     Heart Disease Maternal Aunt     Other Maternal Aunt         hep B/cirrhosis    Diabetes Other     Other Sister         1958 gallstones    Diabetes Sister     Cancer Sister         1958 hyst d/t Ca    Scoliosis Son     Other Son         hx of Cat scratch disease?     Heart Disease Son         cardiomyopathy    Asthma Son      Current Outpatient Medications   Medication Sig Dispense Refill    metoprolol succinate (TOPROL XL) 25 MG extended release tablet 25MG IN AM AND 25- 50MG IN PM as directed 90 tablet 0    ARIPiprazole (ABILIFY) 5 MG tablet 1 TABLET BY MOUTH DAILY 30 tablet 2    estradiol (ESTRACE VAGINAL) 0.1 MG/GM vaginal cream Place 1 g vaginally daily For 2 weeks then twice weekly 1 Tube 3    atorvastatin (LIPITOR) 40 MG tablet 1 po daily 90 tablet 3    sertraline (ZOLOFT) 100 MG tablet Take 1.5 tablets by mouth daily 135 tablet 5     No current facility-administered medications for this visit.      Allergies   Allergen Reactions    Sulfa Antibiotics        Review of Systems    Objective:  /74 (Site: Left Upper Arm, Position: Sitting, Cuff Size: Large Adult)   Ht 5' 3\" (1.6 m)   Wt 229 lb (103.9 kg)   LMP  (Exact Date)   Breastfeeding No   BMI 40.57 kg/m²     BP Readings from Last 3 Encounters:   11/23/21 126/74   08/19/21 138/84   05/18/21 120/72       Pulse Readings from Last 3 Encounters:   08/19/21 73   05/18/21 (!) 44   08/26/20 90       Wt Readings from Last 3 Encounters:   11/23/21 229 lb (103.9 kg)   08/19/21 207 lb (93.9 kg)   05/18/21 182 lb 6.4 oz (82.7 kg)       Physical Exam    Assessment/Plan:     Insomnia, unspecified type  Pain in both lower extremities  The following orders have not been finalized:  -     HYDROcodone-acetaminophen (NORCO) 7.5-325 MG per tablet  Chronic pain of right knee  The following orders have not been finalized:  -     HYDROcodone-acetaminophen ([address]) 7.5-325 MG per tablet  Palpitations  Major depression in remission ([facility] [address]  Pure hypercholesterolemia  Vitamin D deficiency  Prediabetes  low carb diet - a1c 6.2% - recheck 3 months on f/u visit   Sleep hygiene d/w pt  Wants to hold on sleep eval  oarrs reviewed and results c/w rx'd meds  norco 7.5 #60 filled 10/18  Lorazepam in April  F/u 3 months/ prn  utd on covid/ flu et al vaccines  Back exercises d/w pt and handout given  Mood stable on abilify/ zoloft 100 - refills done  Increase D by 1000/d  Cont metoprolol 25 bid for now  Statin every other day as stable  The 10-year ASCVD risk score (Clement Collier., et al., 2013) is: 3.5%    Values used to calculate the score:      Age: 64 years      Sex: Female      Is Non- : No      Diabetic: No      Tobacco smoker: No      Systolic Blood Pressure: 284 mmHg      Is BP treated: No      HDL Cholesterol: 59 mg/dL      Total Cholesterol: 211 mg/dL    Manuel Cruz Kasandra Cueva MD, MD  11/23/2021  1:29 PM

## 2021-12-08 ENCOUNTER — TELEPHONE (OUTPATIENT)
Dept: FAMILY MEDICINE CLINIC | Age: 61
End: 2021-12-08

## 2021-12-08 RX ORDER — METOPROLOL SUCCINATE 25 MG/1
TABLET, EXTENDED RELEASE ORAL
Qty: 90 TABLET | Refills: 2 | Status: SHIPPED | OUTPATIENT
Start: 2021-12-08 | End: 2022-03-07

## 2022-01-10 ENCOUNTER — VIRTUAL VISIT (OUTPATIENT)
Dept: FAMILY MEDICINE CLINIC | Age: 62
End: 2022-01-10
Payer: COMMERCIAL

## 2022-01-10 ENCOUNTER — OFFICE VISIT (OUTPATIENT)
Dept: GYNECOLOGY | Age: 62
End: 2022-01-10
Payer: COMMERCIAL

## 2022-01-10 ENCOUNTER — NURSE TRIAGE (OUTPATIENT)
Dept: OTHER | Facility: CLINIC | Age: 62
End: 2022-01-10

## 2022-01-10 VITALS
WEIGHT: 232 LBS | BODY MASS INDEX: 41.1 KG/M2 | TEMPERATURE: 95.9 F | HEART RATE: 66 BPM | DIASTOLIC BLOOD PRESSURE: 87 MMHG | SYSTOLIC BLOOD PRESSURE: 163 MMHG

## 2022-01-10 DIAGNOSIS — R30.0 DYSURIA: Primary | ICD-10-CM

## 2022-01-10 DIAGNOSIS — R03.0 ELEVATED BP WITHOUT DIAGNOSIS OF HYPERTENSION: Primary | ICD-10-CM

## 2022-01-10 DIAGNOSIS — Z01.419 WELL WOMAN EXAM WITH ROUTINE GYNECOLOGICAL EXAM: ICD-10-CM

## 2022-01-10 DIAGNOSIS — N89.8 VAGINAL DISCHARGE: ICD-10-CM

## 2022-01-10 LAB
BILIRUBIN, POC: NORMAL
BLOOD URINE, POC: NORMAL
CLARITY, POC: NORMAL
COLOR, POC: NORMAL
GLUCOSE URINE, POC: NORMAL
KETONES, POC: NORMAL
LEUKOCYTE EST, POC: NORMAL
NITRITE, POC: NORMAL
PH, POC: 5.5
PROTEIN, POC: NORMAL
SPECIFIC GRAVITY, POC: >1.03
UROBILINOGEN, POC: 0.21

## 2022-01-10 PROCEDURE — 99396 PREV VISIT EST AGE 40-64: CPT | Performed by: OBSTETRICS & GYNECOLOGY

## 2022-01-10 PROCEDURE — 99214 OFFICE O/P EST MOD 30 MIN: CPT | Performed by: NURSE PRACTITIONER

## 2022-01-10 PROCEDURE — 81002 URINALYSIS NONAUTO W/O SCOPE: CPT | Performed by: OBSTETRICS & GYNECOLOGY

## 2022-01-10 RX ORDER — ESTRADIOL 0.1 MG/G
1 CREAM VAGINAL DAILY
Qty: 1 EACH | Refills: 11 | Status: SHIPPED | OUTPATIENT
Start: 2022-01-10

## 2022-01-10 RX ORDER — CHLORTHALIDONE 25 MG/1
25 TABLET ORAL DAILY
Qty: 30 TABLET | Refills: 0 | Status: SHIPPED | OUTPATIENT
Start: 2022-01-10 | End: 2022-01-13 | Stop reason: SDUPTHER

## 2022-01-10 ASSESSMENT — PATIENT HEALTH QUESTIONNAIRE - PHQ9
SUM OF ALL RESPONSES TO PHQ QUESTIONS 1-9: 2
2. FEELING DOWN, DEPRESSED OR HOPELESS: 1
SUM OF ALL RESPONSES TO PHQ QUESTIONS 1-9: 2
SUM OF ALL RESPONSES TO PHQ9 QUESTIONS 1 & 2: 2
SUM OF ALL RESPONSES TO PHQ QUESTIONS 1-9: 2
1. LITTLE INTEREST OR PLEASURE IN DOING THINGS: 1
SUM OF ALL RESPONSES TO PHQ QUESTIONS 1-9: 2

## 2022-01-10 NOTE — TELEPHONE ENCOUNTER
Received call from Sarina Song at [facility], caller not on line. Complaint: HTN at GYN appt today, advised to call PCP. Market: [employer] Name: [employer] telephone number verified as [phone]    Connected with caller via phone, please see below triage          Subjective: Caller states \"I already went to my doctor's office and got a virtual appt at 3:20 today. \"     Current Symptoms: HTN, no need for triage. Care advice provided, patient verbalizes understanding; denies any other questions or concerns; instructed to call back for any new or worsening symptoms. Patient/caller to follow-up with PCP      Attention Provider: Thank you for allowing me to participate in the care of your patient. The patient was connected to triage in response to information provided to the ECC/PSC. Please do not respond through this encounter as the response is not directed to a shared pool.           Reason for Disposition   Caller has already spoken with the PCP (or office), and has no further questions    Protocols used: NO CONTACT OR DUPLICATE CONTACT CALL-ADULT-[place]

## 2022-01-10 NOTE — PROGRESS NOTES
Aubrey Cantu (:  1960) is a 64 y.o. female,Established patient, here for evaluation of the following chief complaint(s): Hypertension (ELEVATED BLOOD PRESSURE AT THE SPECIALIST OFFICE TODAY, RECHECKED HERE /80.)         Sergey Cooper was seen today for hypertension. Diagnoses and all orders for this visit:    Elevated BP without diagnosis of hypertension  BASED ON THE THREE ELEVATED B/P READINGS I WILL PRESCRIBE TH E FOLLOWING  -     chlorthalidone (HYGROTON) 25 MG tablet; Take 1 tablet by mouth daily        CONTINUE TOPROL AS PRESCRIBED SE DW PT  ADVISED TO MONITOR B/P IF SHE CAN GOAL 150/90 OR LESS        Lifestyle Recommendations for High Blood Pressure:  Reduce sodium intake to less than 1500 mg daily  Include 5-7 servings of fruits and vegetables daily  Reduce weight to ideal if overweight  30 minutes of physical activity daily  FOLLOW UP IN ONE WEEK FOR B/P CHECK      SUBJECTIVE/OBJECTIVE:  HPI  WENT TO GYN  OFFICE AND HER B/P WAS  ELEVATED  THERE  CHECKED WITH HER FRIENDS B/P CUFF 120/100  I   THIS STRESSED HER OUT   SHE HAS HAD SOME DIZZINESS LIKE THE ROOM IS SPINNING- THJIS LAST JUST A FEW SECONDS  SHE HAS LOST FIVE POUNDS SHE STARTED A DIET AND JOINED [employer]  HAS NEVER BEEN DIAGNOSED WITH HTN  TAKE TOPROL 25 MG BID FOR RATE CONTROL   WAS TAKING 3 TABS DAILY BUT HER HR WAS GETTING LOW      Review of Systems   Cardiovascular: Negative. Psychiatric/Behavioral: The patient is nervous/anxious.         Patient-Reported Vitals 1/10/2022   Patient-Reported Weight 232   Patient-Reported Height 5 foot 3 inches   Patient-Reported Systolic 623   Patient-Reported Diastolic 80   Patient-Reported Temperature Normal        Physical Exam    [INSTRUCTIONS:  \"[x]\" Indicates a positive item  \"[]\" Indicates a negative item  -- DELETE ALL ITEMS NOT EXAMINED]    Constitutional: [x] Appears well-developed and well-nourished [x] No apparent distress      [] Abnormal -     Mental status: [x] Alert and awake [x] Oriented to person/place/time [x] Able to follow commands    [] Abnormal -     Eyes:   EOM    [x]  Normal    [] Abnormal -   Sclera  [x]  Normal    [] Abnormal -          Discharge [x]  None visible   [] Abnormal -     HENT: [x] Normocephalic, atraumatic  [] Abnormal -   [x] Mouth/Throat: Mucous membranes are moist    External Ears [x] Normal  [] Abnormal -    Neck: [x] No visualized mass [] Abnormal -     Pulmonary/Chest: [x] Respiratory effort normal   [x] No visualized signs of difficulty breathing or respiratory distress        [] Abnormal -      Musculoskeletal:   [x] Normal gait with no signs of ataxia         [x] Normal range of motion of neck        [] Abnormal -     Neurological:        [x] No Facial Asymmetry (Cranial nerve 7 motor function) (limited exam due to video visit)          [x] No gaze palsy        [] Abnormal -          Skin:        [x] No significant exanthematous lesions or discoloration noted on facial skin         [] Abnormal -            Psychiatric:       [x] Normal Affect [] Abnormal -        [x] No Hallucinations    Other pertinent observable physical exam findings:-                Janice Adler, was evaluated through a synchronous (real-time) audio-video encounter. The patient (or guardian if applicable) is aware that this is a billable service. Verbal consent to proceed has been obtained within the past 12 months. The visit was conducted pursuant to the emergency declaration under the [facility], [address] [place] [address] authority and the [employer] General Act. Patient identification was verified, and a caregiver was present when appropriate. The patient was located in a state where the provider was credentialed to provide care. An electronic signature was used to authenticate this note.     --PHONG Christian - CNP

## 2022-01-10 NOTE — PROGRESS NOTES
Subjective:      Patient ID: Love Carrero is a 64 y.o. female. HPI  pts here for annual gyn exam.  Up to date regarding mammogram and colon cancer screening. Notes vag odor and dryness. Stopped using the estrogen cream bc she ran out. Denies other complaints. todays bp was elevated but others were normal.    Review of Systems Pertinent review of systems items discussed above. All others systems items not discussed above were negative. Objective:   Physical Exam  Constitutional:       Appearance: She is well-developed. HENT:      Head: Normocephalic and atraumatic. Neck:      Thyroid: No thyromegaly. Trachea: No tracheal deviation. Cardiovascular:      Rate and Rhythm: Normal rate and regular rhythm. Heart sounds: Normal heart sounds. No murmur heard. Pulmonary:      Effort: Pulmonary effort is normal. No respiratory distress. Breath sounds: Normal breath sounds. No wheezing or rales. Chest:   Breasts:      Right: No mass, nipple discharge or skin change. Left: No mass, nipple discharge or skin change. Abdominal:      General: There is no distension. Palpations: Abdomen is soft. There is no mass. Tenderness: There is no abdominal tenderness. There is no rebound. Genitourinary:     Labia:         Right: No lesion. Left: No lesion. Vagina: Normal. No foreign body. No vaginal discharge. Adnexa:         Right: No mass or tenderness. Left: No mass or tenderness. Rectum: Normal. Guaiac result negative. No mass or external hemorrhoid. Comments: Pap performed. Uterus and cx absent. Musculoskeletal:         General: Normal range of motion. Lymphadenopathy:      Cervical: No cervical adenopathy. Neurological:      Mental Status: She is alert and oriented to person, place, and time. affirm    Assessment:   Normal gyn exam, vag dryness, vag odor     Plan:   Call with results.   F/u annual gyn exam.  rx estrace cream.       Komal Raerdon MD

## 2022-01-11 ENCOUNTER — TELEPHONE (OUTPATIENT)
Dept: FAMILY MEDICINE CLINIC | Age: 62
End: 2022-01-11

## 2022-01-11 NOTE — TELEPHONE ENCOUNTER
HER BP IS STILL ELEVATED -   175/89 THIS MORNING  162/78 - THIS AFTERNOON  175/90 - NOW      PLEASE CALL HER     PT #  [phone]

## 2022-01-12 NOTE — TELEPHONE ENCOUNTER
CALLED AND SPOKE TO PATIENT. HER BP TODAY /79. SHE STATES SHE KNOWS SHE HAS AN APPOINTMENT TOMORROW WITH [facility]. ANYTHING SHE NEEDS TO DO TODAY?  SC

## 2022-01-13 RX ORDER — CHLORTHALIDONE 25 MG/1
25 TABLET ORAL DAILY
Qty: 30 TABLET | Refills: 0 | Status: SHIPPED | OUTPATIENT
Start: 2022-02-09 | End: 2022-01-18 | Stop reason: ALTCHOICE

## 2022-01-18 ENCOUNTER — OFFICE VISIT (OUTPATIENT)
Dept: FAMILY MEDICINE CLINIC | Age: 62
End: 2022-01-18
Payer: COMMERCIAL

## 2022-01-18 VITALS
SYSTOLIC BLOOD PRESSURE: 143 MMHG | OXYGEN SATURATION: 99 % | HEIGHT: 63 IN | HEART RATE: 66 BPM | RESPIRATION RATE: 12 BRPM | DIASTOLIC BLOOD PRESSURE: 79 MMHG | BODY MASS INDEX: 39.87 KG/M2 | WEIGHT: 225 LBS

## 2022-01-18 DIAGNOSIS — R63.5 WEIGHT GAIN: ICD-10-CM

## 2022-01-18 DIAGNOSIS — F41.9 ANXIETY: ICD-10-CM

## 2022-01-18 DIAGNOSIS — I10 UNCONTROLLED HYPERTENSION: ICD-10-CM

## 2022-01-18 DIAGNOSIS — I10 UNCONTROLLED HYPERTENSION: Primary | ICD-10-CM

## 2022-01-18 DIAGNOSIS — I10 HYPERTENSION, UNSPECIFIED TYPE: Primary | ICD-10-CM

## 2022-01-18 LAB
A/G RATIO: 2.1 (ref 1.1–2.2)
ALBUMIN SERPL-MCNC: 4.9 G/DL (ref 3.4–5)
ALP BLD-CCNC: 58 U/L (ref 40–129)
ALT SERPL-CCNC: 18 U/L (ref 10–40)
ANION GAP SERPL CALCULATED.3IONS-SCNC: 15 MMOL/L (ref 3–16)
AST SERPL-CCNC: 18 U/L (ref 15–37)
BILIRUB SERPL-MCNC: 0.5 MG/DL (ref 0–1)
BUN BLDV-MCNC: 17 MG/DL (ref 7–20)
CALCIUM SERPL-MCNC: 9.9 MG/DL (ref 8.3–10.6)
CHLORIDE BLD-SCNC: 96 MMOL/L (ref 99–110)
CO2: 29 MMOL/L (ref 21–32)
CREAT SERPL-MCNC: 0.8 MG/DL (ref 0.6–1.2)
GFR AFRICAN AMERICAN: >60
GFR NON-AFRICAN AMERICAN: >60
GLUCOSE BLD-MCNC: 164 MG/DL (ref 70–99)
POTASSIUM SERPL-SCNC: 3.9 MMOL/L (ref 3.5–5.1)
SODIUM BLD-SCNC: 140 MMOL/L (ref 136–145)
T4 FREE: 1.4 NG/DL (ref 0.9–1.8)
TOTAL PROTEIN: 7.2 G/DL (ref 6.4–8.2)
TSH SERPL DL<=0.05 MIU/L-ACNC: 2.22 UIU/ML (ref 0.27–4.2)

## 2022-01-18 PROCEDURE — 36415 COLL VENOUS BLD VENIPUNCTURE: CPT | Performed by: FAMILY MEDICINE

## 2022-01-18 PROCEDURE — 99214 OFFICE O/P EST MOD 30 MIN: CPT | Performed by: NURSE PRACTITIONER

## 2022-01-18 RX ORDER — LORAZEPAM 1 MG/1
1 TABLET ORAL 2 TIMES DAILY PRN
Qty: 45 TABLET | Refills: 0 | Status: SHIPPED | OUTPATIENT
Start: 2022-01-18 | End: 2022-02-18 | Stop reason: SDUPTHER

## 2022-01-18 RX ORDER — LOSARTAN POTASSIUM AND HYDROCHLOROTHIAZIDE 25; 100 MG/1; MG/1
1 TABLET ORAL DAILY
Qty: 30 TABLET | Refills: 0 | Status: SHIPPED | OUTPATIENT
Start: 2022-01-18 | End: 2022-02-02 | Stop reason: SDUPTHER

## 2022-01-18 NOTE — PROGRESS NOTES
Leroy Hernandez (:  1960) is a 64 y.o. female,Established patient, here for evaluation of the following chief complaint(s):    Hypertension (BP SEEMS BETTER STATES SHE SPOKE WITH BRADLEY SAT ABOUT DOING LABS )      SUBJECTIVE/OBJECTIVE:  HPI  HER B/P /70 THIS AM SATURDAY 170/88 SHE WAS SCARED INCREASED TOPROL 50 NIGHTLY 25 IN THE AM HYGROTON 25 178/91  164/88 145/83 167/81 SHE IS NOT DRINKING CAFFEINE - WATCHING HER SODIUM INTAKE NOT EXERCISING SHE LOST 12 POUNDS IN THE LAST THREE WEEKS USING [employer]    HER ANXIETY IS GETTIng BAD BECAUSE SHE IS WORRIED ABOUT HER B/P SHE IS NOT SLEEPING WELL SHE WAKES UP THINKING ABIUT HER BLOOD PRESUES  Review of Systems   Cardiovascular: Negative. Psychiatric/Behavioral: The patient is nervous/anxious. Physical Exam  Vitals reviewed. Constitutional:       General: She is awake. She is not in acute distress. Appearance: Normal appearance. She is well-developed, well-groomed and normal weight. She is not ill-appearing, toxic-appearing or diaphoretic. Cardiovascular:      Rate and Rhythm: Normal rate and regular rhythm. Heart sounds: Normal heart sounds, S1 normal and S2 normal. Heart sounds not distant. No murmur heard. No systolic murmur is present. No diastolic murmur is present. No friction rub. No gallop. No S3 or S4 sounds. Neurological:      Mental Status: She is alert. Psychiatric:         Behavior: Behavior is cooperative. Ana M Mott was seen today for hypertension. Diagnoses and all orders for this visit:    Hypertension, unspecified type  Hygroton discontinued  -     losartan-hydroCHLOROthiazide (HYZAAR) 100-25 MG per tablet; Take 1 tablet by mouth daily  Continue toprol   Continue monitoring b/p gaol 150/90 or less  -     COMPREHENSIVE METABOLIC PANEL  Metanephrines ordered by Dr Maricruz Vaughan  Follow up in one week for b/p check  Weight gain  -     T4, Free; Future  -     TSH without Reflex;  Future  -     TSH without Reflex  -     T4, Free    Anxiety  Controlled substances monitoring: possible medication side effects, risk of tolerance and/or dependence, and alternative treatments discussed, no signs of potential drug abuse or diversion identified and OARRS report reviewed today- activity consistent with treatment plan. -     LORazepam (ATIVAN) 1 MG tablet; Take 1 tablet by mouth 2 times daily as needed for Anxiety for up to 30 days.  Se herbert pt  Advised to use as needed only  Continue Zoloft as prescribed          --PHONG Downing - CNP

## 2022-01-18 NOTE — PATIENT INSTRUCTIONS
GENERAL OFFICE POLICIES      Telephone Calls: Messages will be answered within 1-2 business days, unless the provider is out of the office. If it is urgent a covering provider will answer. (this does not include Medication refills). MyChart: We recommend all patients sign up for [employer]. Through this portal you can see your lab results, request refills, schedule appointments, pay your bill and send messages to the office. [employer] messages will be answered within 1-2 business days unless the provider is out of the office. For urgent matters, please call the office. Appointments:  All appointments must be scheduled. We ask all patients to schedule their next follow up appointment before they leave the office to make sure you will be able to be seen before you run out of medications. 24 hours notice is required to cancel or reschedule an appointment to avoid being marked as a no show. You may be dismissed from the practice after 3 no shows. LATE for Appointment: If you are 15 or more minutes late for your appointment, you may be asked to reschedule. MA/LAB APPTS: Must be scheduled, cannot accept walk in lab visits. We only draw labs for patients established in our office. We only do injections for medications ordered by our office. Acute Sick Visits:  Nothing other than acute complaint will be addressed at this visit. TRADITIONAL MEDICARE  DOES NOT COVER PHYSICALS  MEDICARE WELLNESS VISITS: These are NOT physicals but the free annual visit offered by Medicare to discuss wellness issues. Medication refills, checkups, etc. will not be addressed during this visit. Medication Refills: Refills are handled electronically so please contact your pharmacy for medication refills even if current refills have been exhausted. If you are on a controlled medication you will be referred to a specialist (pain specialist, psychiatry, etc). Forms:  There is a $35 fee to fill out FMLA/Disability paperwork, payable at time of . Instead of the fee, you can choose to have the paperwork filled out during a separate office visit that is for filling out the paperwork only. Medication Samples: This office does not carry medication samples. If you need assistance in getting your medications, then please let the medical assistant know so they can help you sign up for a drug assistance program that can help get medications at a reduced cost or even free (if you qualify). Workman's Comp Claims: We do not handle workman's comp cases or claims. You will need to go to an urgent care to be seen or to whomever your employer uses. General  Any abusive/rude behavior toward staff/providers may be cause for dismissal.    Patient Education        Learning About Anxiety Disorders  What are anxiety disorders? Anxiety disorders are a type of medical problem. They cause severe anxiety. When you feel anxious, you feel that something bad is about to happen. This feeling interferes with your life. These disorders include:  · Generalized anxiety disorder. You feel worried and stressed about many everyday events and activities. This goes on for several months and disrupts your life on most days. · Panic disorder. You have repeated panic attacks. A panic attack is a sudden, intense fear or anxiety. It may make you feel short of breath. Your heart may pound. · Social anxiety disorder. You feel very anxious about what you will say or do in front of people. For example, you may be scared to talk or eat in public. This problem affects your daily life. · Phobias. You are very scared of a specific object, situation, or activity. For example, you may fear spiders, high places, or small spaces. What are the symptoms? Generalized anxiety disorder  Symptoms may include:  · Feeling worried and stressed about many things almost every day. · Feeling tired or irritable. You may have a hard time concentrating.   · Having headaches or muscle aches.  · Having a hard time getting to sleep or staying asleep. Panic disorder  You may have repeated panic attacks when there is no reason for feeling afraid. You may change your daily activities because you worry that you will have another attack. Symptoms may include:  · Intense fear, terror, or anxiety. · Trouble breathing or very fast breathing. · Chest pain or tightness. · A heartbeat that races or is not regular. Social anxiety disorder  Symptoms may include:  · Fear about a social situation, such as eating in front of others or speaking in public. You may worry a lot. Or you may be afraid that something bad will happen. · Anxiety that can cause you to blush, sweat, and feel shaky. · A heartbeat that is faster than normal.  · A hard time focusing. Phobias  Symptoms may include:  · More fear than most people of being around an object, being in a situation, or doing an activity. You might also be stressed about the chance of being around the thing you fear. · Worry about losing control, panicking, fainting, or having physical symptoms like a faster heartbeat when you are around the situation or object. How are these disorders treated? Anxiety disorders can be treated with medicines or counseling. A combination of both may be used. Medicines may include:  · Antidepressants. These may help your symptoms by keeping chemicals in your brain in balance. · Benzodiazepines. These may give you short-term relief of your symptoms. Some people use cognitive-behavioral therapy. A therapist helps you learn to change stressful or bad thoughts into helpful thoughts. Lead a healthy lifestyle  A healthy lifestyle may help you feel better. · Get at least 30 minutes of exercise on most days of the week. Walking is a good choice. · Eat a healthy diet. Include fruits, vegetables, lean proteins, and whole grains in your diet each day. · Try to go to bed at the same time every night.  Try for 8 hours of sleep a night.  · Find ways to manage stress. Try relaxation exercises. · Avoid alcohol and illegal drugs. Follow-up care is a key part of your treatment and safety. Be sure to make and go to all appointments, and call your doctor if you are having problems. It's also a good idea to know your test results and keep a list of the medicines you take. Where can you learn more? Go to https://[employer] GrouppeNeozone.[employer]. org and sign in to your [employer] account. Enter L863 in the [employer]els box to learn more about \"Learning About Anxiety Disorders. \"     If you do not have an account, please click on the \"Sign Up Now\" link. Current as of: June 16, 2021               Content Version: 13.1  © 2006-2021 [employer]. Care instructions adapted under license by [facility] ([facility]). If you have questions about a medical condition or this instruction, always ask your healthcare professional. [place] [address] any warranty or liability for your use of this information. Patient Education        Learning About Diuretics for High Blood Pressure  Overview  Diuretics help to lower blood pressure. This reduces your risk of a heart attack and stroke. It also reduces your risk of kidney disease. Diuretics cause your kidneys to remove sodium and water. They also relax the blood vessel walls. These help lower your blood pressure. Examples  · Chlorthalidone  · Hydrochlorothiazide  Possible side effects  There are some common side effects. They are:  · Too little potassium. · Feeling dizzy. · Rash. · Urinating a lot. · High blood sugar. (But this is not common.)  You may have other side effects. Check the information that comes with your medicine. What to know about taking this medicine  · You may take other medicines for blood pressure. Diuretics can help those work better. They can also prevent extra fluid in your body. · You may need to take potassium pills.  Ask your doctor about this.  · You may need blood tests to check on your health. For example, you may have tests to check your kidneys and your potassium level. · Take your medicines exactly as prescribed. Call your doctor if you think you are having a problem with your medicine. · Check with your doctor or pharmacist before you use any other medicines. This includes over-the-counter medicines. Make sure your doctor knows all of the medicines, vitamins, herbal products, and supplements you take. Taking some medicines together can cause problems. Where can you learn more? Go to https://chpeephraimewjose.[employer]. org and sign in to your [employer]ix account. Enter M924 in the [employer] box to learn more about \"Learning About Diuretics for High Blood Pressure. \"     If you do not have an account, please click on the \"Sign Up Now\" link. Current as of: April 29, 2021               Content Version: 13.1  © 2006-2021 [employer]. Care instructions adapted under license by [facility] ([facility]). If you have questions about a medical condition or this instruction, always ask your healthcare professional. [place] [address] any warranty or liability for your use of this information. Patient Education        High Blood Pressure: Care Instructions  Overview     It's normal for blood pressure to go up and down throughout the day. But if it stays up, you have high blood pressure. Another name for high blood pressure is hypertension. Despite what a lot of people think, high blood pressure usually doesn't cause headaches or make you feel dizzy or lightheaded. It usually has no symptoms. But it does increase your risk of stroke, heart attack, and other problems. You and your doctor will talk about your risks of these problems based on your blood pressure. Your doctor will give you a goal for your blood pressure. Your goal will be based on your health and your age.   Lifestyle changes, such as eating healthy and being active, are always important to help lower blood pressure. You might also take medicine to reach your blood pressure goal.  Follow-up care is a key part of your treatment and safety. Be sure to make and go to all appointments, and call your doctor if you are having problems. It's also a good idea to know your test results and keep a list of the medicines you take. How can you care for yourself at home? Medical treatment  · If you stop taking your medicine, your blood pressure will go back up. You may take one or more types of medicine to lower your blood pressure. Be safe with medicines. Take your medicine exactly as prescribed. Call your doctor if you think you are having a problem with your medicine. · Talk to your doctor before you start taking aspirin every day. Aspirin can help certain people lower their risk of a heart attack or stroke. But taking aspirin isn't right for everyone, because it can cause serious bleeding. · See your doctor regularly. You may need to see the doctor more often at first or until your blood pressure comes down. · If you are taking blood pressure medicine, talk to your doctor before you take decongestants or anti-inflammatory medicine, such as ibuprofen. Some of these medicines can raise blood pressure. · Learn how to check your blood pressure at home. Lifestyle changes  · Stay at a healthy weight. This is especially important if you put on weight around the waist. Losing even 10 pounds can help you lower your blood pressure. · If your doctor recommends it, get more exercise. Walking is a good choice. Bit by bit, increase the amount you walk every day. Try for at least 30 minutes on most days of the week. You also may want to swim, bike, or do other activities. · Avoid or limit alcohol. Talk to your doctor about whether you can drink any alcohol. · Try to limit how much sodium you eat to less than 2,300 milligrams (mg) a day.  Your doctor may ask you to try to eat less than 1,500 mg a day. · Eat plenty of fruits (such as bananas and oranges), vegetables, legumes, whole grains, and low-fat dairy products. · Lower the amount of saturated fat in your diet. Saturated fat is found in animal products such as milk, cheese, and meat. Limiting these foods may help you lose weight and also lower your risk for heart disease. · Do not smoke. Smoking increases your risk for heart attack and stroke. If you need help quitting, talk to your doctor about stop-smoking programs and medicines. These can increase your chances of quitting for good. When should you call for help? Call 911  anytime you think you may need emergency care. This may mean having symptoms that suggest that your blood pressure is causing a serious heart or blood vessel problem. Your blood pressure may be over 180/120. For example, call 911 if:    · You have symptoms of a heart attack. These may include:  ? Chest pain or pressure, or a strange feeling in the chest.  ? Sweating. ? Shortness of breath. ? Nausea or vomiting. ? Pain, pressure, or a strange feeling in the back, neck, jaw, or upper belly or in one or both shoulders or arms. ? Lightheadedness or sudden weakness. ? A fast or irregular heartbeat.     · You have symptoms of a stroke. These may include:  ? Sudden numbness, tingling, weakness, or loss of movement in your face, arm, or leg, especially on only one side of your body. ? Sudden vision changes. ? Sudden trouble speaking. ? Sudden confusion or trouble understanding simple statements. ? Sudden problems with walking or balance. ? A sudden, severe headache that is different from past headaches.     · You have severe back or belly pain. Do not wait until your blood pressure comes down on its own. Get help right away.   Call your doctor now or seek immediate care if:    · Your blood pressure is much higher than normal (such as 180/120 or higher), but you don't have symptoms.     · You think high blood pressure is causing symptoms, such as:  ? Severe headache.  ? Blurry vision. Watch closely for changes in your health, and be sure to contact your doctor if:    · Your blood pressure measures higher than your doctor recommends at least 2 times. That means the top number is higher or the bottom number is higher, or both.     · You think you may be having side effects from your blood pressure medicine. Where can you learn more? Go to https://Foodie Media Networkpeeleazareb.[employer]. org and sign in to your WinDensity account. Enter G876 in the EnterCloud Solutions box to learn more about \"High Blood Pressure: Care Instructions. \"     If you do not have an account, please click on the \"Sign Up Now\" link. Current as of: April 29, 2021               Content Version: 13.1  © 2747-8132 [employer]. Care instructions adapted under license by [facility] ([facility]). If you have questions about a medical condition or this instruction, always ask your healthcare professional. [address] any warranty or liability for your use of this information.

## 2022-01-20 ENCOUNTER — TELEPHONE (OUTPATIENT)
Dept: FAMILY MEDICINE CLINIC | Age: 62
End: 2022-01-20

## 2022-01-20 RX ORDER — AMLODIPINE BESYLATE 5 MG/1
5 TABLET ORAL DAILY
Qty: 30 TABLET | Refills: 3 | Status: SHIPPED | OUTPATIENT
Start: 2022-01-20 | End: 2022-02-02

## 2022-01-20 NOTE — TELEPHONE ENCOUNTER
Pt needs some clarification about her BP medication. 151/87 was her BP . This was before she took her medications. Her heart rate is 53. She was not to take her BP at night if her HR was below 50. So she is not sure if she should take her night time BP med.     Please call pt

## 2022-01-20 NOTE — TELEPHONE ENCOUNTER
THIS IS IN PLACE OF THE LOSARTAN-HCTZ CORRECT? AND PATIENT HAS ALREADY TAKEN 50MG OF METOPROLOL AND THE LOSARTAN TODAY. SHOULD SHE GO AHEAD AND  AMLODIPINE TONIGHT AND TAKE. OR WAIT AND START THE NEW REG TOMORROW?

## 2022-01-20 NOTE — TELEPHONE ENCOUNTER
She continues losartan hctz in am, metoprolol 25 bid and adds norvasc 5mg nightly until we can get bp down, then we will work on reducing meds as possible once stabilizes.  She can take an additional 25mg metoprolol tonight if sbp >160 and heart rate 60 or higher at rest.  It will take 1-2 weeks for amlodipine to reach maximum effect

## 2022-01-20 NOTE — TELEPHONE ENCOUNTER
W/ low heart rate - I suggest going back to 25mg metoprolol twice daily and start amlodipine 5mg at night - sent to [facility]. Let me know if problems, o/w send me bp/ heart rate readings next week.

## 2022-01-21 LAB
METANEPH/PLASMA INTERP: NORMAL
METANEPHRINE FREE PLASMA: 0.22 NMOL/L (ref 0–0.49)
NORMETANEPHRINE FREE PLASMA: 0.43 NMOL/L (ref 0–0.89)

## 2022-02-02 ENCOUNTER — OFFICE VISIT (OUTPATIENT)
Dept: FAMILY MEDICINE CLINIC | Age: 62
End: 2022-02-02
Payer: COMMERCIAL

## 2022-02-02 VITALS — BODY MASS INDEX: 39.16 KG/M2 | HEIGHT: 63 IN | WEIGHT: 221 LBS

## 2022-02-02 DIAGNOSIS — I10 HYPERTENSION, UNSPECIFIED TYPE: ICD-10-CM

## 2022-02-02 PROCEDURE — 99213 OFFICE O/P EST LOW 20 MIN: CPT | Performed by: NURSE PRACTITIONER

## 2022-02-02 RX ORDER — LOSARTAN POTASSIUM AND HYDROCHLOROTHIAZIDE 25; 100 MG/1; MG/1
1 TABLET ORAL DAILY
Qty: 90 TABLET | Refills: 0 | Status: SHIPPED | OUTPATIENT
Start: 2022-02-17 | End: 2022-02-14

## 2022-02-02 NOTE — PROGRESS NOTES
Thayer Galeazzi (:  1960) is a 64 y.o. female,Established patient, here for evaluation of the following chief complaint(s):    Hypertension (HTN ROUTINE FOLLOW UP )      SUBJECTIVE/OBJECTIVE:  HPI    ONE WEEK B/P FOLLOW UP   HER B/P IS GOOD TODAY SHE HAS NOT CHECKED AT HOME SINCE HER LAST VISIT BECAUSE IT WAS STRESSING HER OUT   - SHE DENIES C/P - SWELLING OF FEET OR ANKLES - HER HEART HAS BEEN  LOW 40's WHEN SHE IS RESTING HIGHER WITH ACTIVITY  SHE TAKES THE TOPROL AND HYZARR IN THE AM AND ANOTHER [address] ABOUT FIVE  Review of Systems   Cardiovascular: Negative. Physical Exam  Constitutional:       General: She is awake. She is not in acute distress. Appearance: Normal appearance. She is well-developed and well-groomed. She is obese. She is not ill-appearing, toxic-appearing or diaphoretic. Neurological:      Mental Status: She is alert. Psychiatric:         Attention and Perception: Attention and perception normal.         Mood and Affect: Mood and affect normal.         Speech: Speech normal.         Behavior: Behavior normal. Behavior is cooperative. Thought Content: Thought content normal.         Cognition and Memory: Cognition and memory normal.         Judgment: Judgment normal.         Ghassan Goncalves was seen today for hypertension. Diagnoses and all orders for this visit:    Hypertension, unspecified type  CONTINUE  -     losartan-hydroCHLOROthiazide (HYZAAR) 100-25 MG per tablet; Take 1 tablet by mouth daily  Reduce sodium intake to less than 1500 mg daily  Include 5-7 servings of fruits and vegetables daily  Reduce weight to ideal if overweight  30 minutes of physical activity daily            B/P CHECK IN ONE WEEK            FOLLOW UP WITH DR HARRIS AS SCHEDULED          An electronic signature was used to authenticate this note.     --[place] Cope, APRN - CNP

## 2022-02-07 RX ORDER — SERTRALINE HYDROCHLORIDE 100 MG/1
TABLET, FILM COATED ORAL
Qty: 135 TABLET | Refills: 0 | Status: SHIPPED | OUTPATIENT
Start: 2022-02-07 | End: 2022-02-22 | Stop reason: SDUPTHER

## 2022-02-09 ENCOUNTER — NURSE ONLY (OUTPATIENT)
Dept: FAMILY MEDICINE CLINIC | Age: 62
End: 2022-02-09

## 2022-02-09 VITALS — HEART RATE: 71 BPM | DIASTOLIC BLOOD PRESSURE: 62 MMHG | SYSTOLIC BLOOD PRESSURE: 124 MMHG

## 2022-02-09 DIAGNOSIS — Z01.30 BLOOD PRESSURE CHECK: Primary | ICD-10-CM

## 2022-02-13 DIAGNOSIS — I10 HYPERTENSION, UNSPECIFIED TYPE: ICD-10-CM

## 2022-02-14 RX ORDER — LOSARTAN POTASSIUM AND HYDROCHLOROTHIAZIDE 25; 100 MG/1; MG/1
TABLET ORAL
Qty: 30 TABLET | Refills: 0 | Status: SHIPPED | OUTPATIENT
Start: 2022-02-14 | End: 2022-03-30 | Stop reason: SDUPTHER

## 2022-02-18 ENCOUNTER — NURSE TRIAGE (OUTPATIENT)
Dept: OTHER | Facility: CLINIC | Age: 62
End: 2022-02-18

## 2022-02-18 ENCOUNTER — OFFICE VISIT (OUTPATIENT)
Dept: FAMILY MEDICINE CLINIC | Age: 62
End: 2022-02-18
Payer: COMMERCIAL

## 2022-02-18 VITALS
SYSTOLIC BLOOD PRESSURE: 128 MMHG | OXYGEN SATURATION: 98 % | TEMPERATURE: 97.2 F | HEIGHT: 63 IN | HEART RATE: 76 BPM | WEIGHT: 213 LBS | RESPIRATION RATE: 18 BRPM | DIASTOLIC BLOOD PRESSURE: 78 MMHG | BODY MASS INDEX: 37.74 KG/M2

## 2022-02-18 DIAGNOSIS — R73.03 PREDIABETES: ICD-10-CM

## 2022-02-18 DIAGNOSIS — F41.9 ANXIETY: Primary | ICD-10-CM

## 2022-02-18 DIAGNOSIS — F41.0 PANIC ATTACKS: ICD-10-CM

## 2022-02-18 LAB — HBA1C MFR BLD: 6.3 %

## 2022-02-18 PROCEDURE — 99214 OFFICE O/P EST MOD 30 MIN: CPT | Performed by: NURSE PRACTITIONER

## 2022-02-18 PROCEDURE — 83036 HEMOGLOBIN GLYCOSYLATED A1C: CPT | Performed by: NURSE PRACTITIONER

## 2022-02-18 RX ORDER — AMLODIPINE BESYLATE 5 MG/1
1 TABLET ORAL DAILY
COMMUNITY
Start: 2022-02-15 | End: 2022-04-18

## 2022-02-18 RX ORDER — LORAZEPAM 1 MG/1
1 TABLET ORAL 2 TIMES DAILY PRN
Qty: 60 TABLET | Refills: 0 | Status: SHIPPED
Start: 2022-02-18 | End: 2022-03-04 | Stop reason: ALTCHOICE

## 2022-02-18 ASSESSMENT — ENCOUNTER SYMPTOMS
SINUS PRESSURE: 0
ABDOMINAL PAIN: 0
NAUSEA: 0
EYE DISCHARGE: 0
CONSTIPATION: 0
COLOR CHANGE: 0
SHORTNESS OF BREATH: 0
ABDOMINAL DISTENTION: 0
COUGH: 0
CHEST TIGHTNESS: 0
BACK PAIN: 0
DIARRHEA: 0
SINUS PAIN: 0

## 2022-02-18 NOTE — PROGRESS NOTES
Date of Service:  2022    Marian Young (:  1960) is a 64 y.o. female, here for evaluation of the following medical concerns:    Chief Complaint   Patient presents with    Anxiety     PT C/O HAVING INCREASED ANXIETY ATTACKS THAT HAVE BEEN GOING ON FOR A LITTLE WHILE         HPI      Anxiety  Patient here today with concerns of anxiety. Having panic attacks. Pt was diagnosed with high blood pressure a couple months ago. Had high BP and has been able to feel heart racing and has trouble functioning because she feels nervous and swears her BP must be high. Pt was started on ativan 1 mg as needed by Bita BYRD. Was off work yesterday and took [address] and felt OK in the morning. As soon as her day gets busy then she starts to have racing thoughts and doesn't feel right and gets very anxious. Suffered from anxiety in the past but \"not this bad. \" Hannah Ramsey today and felt the anxiety start as soon as she got out of bed. Pt fears she may pass out because of the anxiety and then gets more worked up and cannot calm herself down. Pt nervous and tearful at visit, despite 1 mg ativan dose this AM. Pt feels very alone, kids have moved out and dgtr has new boyfriend. Talks to her kids but they tell her they can't be there all the time. Pt has not gone to counseling. Pt on zoloft 100 mg daily; we have recorded 150 mg daily. Pt decreased it herself to 100 mg daily a few months ago because she \"felt fine. \" Pt upset because she wants us to do something to make anxiety better. Already on ativan as needed. Tried buspar in past and had no improvement. Pt gets nervous anytime she needs to leave the house, even going to the store. She is terribly afraid \"something is going to happen to her. \" Pt does not check BP at home because this was causing her more anxiety.  Anxiety panic attacks have only been going on since 2022 when diagnosed with hypertension; went to gyno appt 2022 and it was high and then came to virtual visit here at our office. Was already on metoprolol for heart palpitations and it helps with her anxiety and was only related to her anxiety, not her BP specifically. Pt was started on losartan-hctz and amlodipine since Jan, BP well controlled at office today and HR stable and pt still feeling very anxious. Pt also on abilify as well for mood, as well as zoloft. Review of Systems   Constitutional: Negative for activity change, appetite change, fatigue, fever and unexpected weight change. HENT: Negative for congestion, ear pain, sinus pressure and sinus pain. Eyes: Negative for discharge and visual disturbance. Respiratory: Negative for cough, chest tightness and shortness of breath. Cardiovascular: Negative for chest pain, palpitations and leg swelling. Gastrointestinal: Negative for abdominal distention, abdominal pain, constipation, diarrhea and nausea. Endocrine: Negative for cold intolerance, heat intolerance, polydipsia, polyphagia and polyuria. Genitourinary: Negative for decreased urine volume, difficulty urinating, dysuria, flank pain, frequency and urgency. Musculoskeletal: Negative for arthralgias, back pain, gait problem, joint swelling, myalgias and neck pain. Skin: Negative for color change, rash and wound. Allergic/Immunologic: Negative for food allergies and immunocompromised state. Neurological: Negative for dizziness, tremors, speech difficulty, weakness, light-headedness, numbness and headaches. Hematological: Negative for adenopathy. Does not bruise/bleed easily. Psychiatric/Behavioral: Negative for confusion, decreased concentration, self-injury, sleep disturbance and suicidal ideas. The patient is nervous/anxious. Prior to Visit Medications    Medication Sig Taking?  Authorizing Provider   amLODIPine (NORVASC) 5 MG tablet Take 1 tablet by mouth daily Yes Historical Provider, MD   LORazepam (ATIVAN) 1 MG tablet Take 1 tablet by mouth 2 times daily as needed for Anxiety for up to 30 days. Yes Gisella Momence, APRN - CNP   losartan-hydroCHLOROthiazide (HYZAAR) 100-25 MG per tablet TAKE ONE TABLET BY MOUTH DAILY Yes Arabella Holguin APRN - CNP   sertraline (ZOLOFT) 100 MG tablet TAKE 1 AND 1/2 TABLET BY MOUTH DAILY Yes Bita Baron Kingston, APRN - CNP   estradiol (ESTRACE VAGINAL) 0.1 MG/GM vaginal cream Place 1 g vaginally daily For 2 weeks then twice weekly Yes Memo Martinez MD   metoprolol succinate (TOPROL XL) 25 MG extended release tablet 25MG IN AM AND 25- 50MG IN PM as directed  Patient taking differently: 25MG IN AM AND 25 MG IN PM as directed Yes Cori Schwab, MD   ARIPiprazole (ABILIFY) 5 MG tablet 1 TABLET BY MOUTH DAILY Yes Cori Schwab, MD   atorvastatin (LIPITOR) 40 MG tablet 1 po daily  Patient taking differently: Take 20 mg by mouth daily  Yes Cori Schwab, MD        Social History     Tobacco Use    Smoking status: Former Smoker     Packs/day: 1.00     Years: 30.00     Pack years: 30.00     Start date: 10/30/1974     Quit date: 10/30/2004     Years since quittin.3    Smokeless tobacco: Never Used    Tobacco comment: Year started: 65 Year Quit: ; Educated not to start back. Substance Use Topics    Alcohol use: No     Comment: occasionally         Vitals:    22 0924   BP: 128/78   Site: Right Upper Arm   Position: Sitting   Cuff Size: Large Adult   Pulse: 76   Resp: 18   Temp: 97.2 °F (36.2 °C)   TempSrc: Infrared   SpO2: 98%   Weight: 213 lb (96.6 kg)   Height: 5' 3\" (1.6 m)     Estimated body mass index is 37.73 kg/m² as calculated from the following:    Height as of this encounter: 5' 3\" (1.6 m). Weight as of this encounter: 213 lb (96.6 kg). Physical Exam  Vitals reviewed. Constitutional:       General: She is awake. Appearance: Normal appearance. She is well-developed and well-groomed. She is obese. She is not ill-appearing. HENT:      Head: Normocephalic and atraumatic.       Right Ear: Hearing, tympanic membrane, ear canal and external ear normal.      Left Ear: Hearing, tympanic membrane, ear canal and external ear normal.      Nose: Nose normal.      Mouth/Throat:      Lips: Pink. Mouth: Mucous membranes are moist.      Pharynx: Oropharynx is clear. Eyes:      General: Lids are normal.      Extraocular Movements: Extraocular movements intact. Conjunctiva/sclera: Conjunctivae normal.      Pupils: Pupils are equal, round, and reactive to light. Neck:      Thyroid: No thyromegaly. Vascular: No carotid bruit. Cardiovascular:      Rate and Rhythm: Normal rate. Pulses:           Carotid pulses are 2+ on the right side and 2+ on the left side. Radial pulses are 2+ on the right side and 2+ on the left side. Posterior tibial pulses are 2+ on the right side and 2+ on the left side. Heart sounds: Normal heart sounds, S1 normal and S2 normal. No murmur heard. Pulmonary:      Effort: Pulmonary effort is normal.      Breath sounds: Normal breath sounds. Chest:   Breasts:      Right: No supraclavicular adenopathy. Left: No supraclavicular adenopathy. Abdominal:      General: Bowel sounds are normal. There is no abdominal bruit. Palpations: Abdomen is soft. Tenderness: There is no abdominal tenderness. Genitourinary:     Comments: Deferred  Musculoskeletal:         General: Normal range of motion. Cervical back: Full passive range of motion without pain, normal range of motion and neck supple. Right lower leg: No edema. Left lower leg: No edema. Lymphadenopathy:      Head:      Right side of head: No submental, submandibular, tonsillar, preauricular, posterior auricular or occipital adenopathy. Left side of head: No submental, submandibular, tonsillar, preauricular, posterior auricular or occipital adenopathy. Cervical: No cervical adenopathy.       Right cervical: No superficial, deep or posterior cervical adenopathy. Left cervical: No superficial, deep or posterior cervical adenopathy. Upper Body:      Right upper body: No supraclavicular adenopathy. Left upper body: No supraclavicular adenopathy. Skin:     General: Skin is warm and dry. Capillary Refill: Capillary refill takes less than 2 seconds. Neurological:      General: No focal deficit present. Mental Status: She is alert and oriented to person, place, and time. Mental status is at baseline. Sensory: Sensation is intact. Motor: Motor function is intact. Coordination: Coordination is intact. Gait: Gait is intact. Psychiatric:         Attention and Perception: Attention and perception normal.         Mood and Affect: Mood is anxious. Affect is tearful. Speech: Speech normal.         Behavior: Behavior normal. Behavior is cooperative. Thought Content: Thought content normal.         Cognition and Memory: Cognition and memory normal.         Judgment: Judgment normal.         ASSESSMENT/PLAN:  1. Anxiety  -     LORazepam (ATIVAN) 1 MG tablet; Take 1 tablet by mouth 2 times daily as needed for Anxiety for up to 30 days. , Disp-60 tablet, R-0Normal   Increase zoloft from 100 mg back to 150 mg daily like she was on previously and had decreased on her own 3 months ago, may just now noticing the effects of the decrease in her dose   Pt unhappy with today's visit, feels like more should be done because she feels how she feels   Encouraged psych/counseling establish care   Information printed and reviewed   Pt recently started back to work at [employer] 2 days ago, this is also contributing to her anxiety   Discussed self care, says she does not do any of this and this also contributes to her anxiety   Pt felt better when she wasn't working, work is a stressor   Pt was taking metoprolol 50 mg and was decreased to 25 mg because it was causing bradycardia at 50 mg daily  2.  Panic attacks  -     LORazepam (ATIVAN) 1 MG tablet; Take 1 tablet by mouth 2 times daily as needed for Anxiety for up to 30 days. , Disp-60 tablet, R-0Normal   Encouraged to try ativan twice daily   Need to establish care with therapist to discuss triggers and coping mechanism   BP stable, HR stable, discussed with pt and still feels anxious despite stable levels even when anxious and tearful at visit today  3. Prediabetes  -     POCT glycosylated hemoglobin (Hb A1C)    A1C 6.3 today, will be addressed at visit with Dr Roel Ureña next week      Care Gaps Addressed  Colon cancer screening discussed- FIT test fall 2021      Today I spent 33 minutes preparing to see this patient, reviewing history and tests, face to face time with patient and performing a thorough physical exam, and educating patient(family) on diagnosis, plan of care, and treatment plan. I have reviewed patient's pertinent medical history, relevant laboratory and imaging studies, and past/future health maintenance. Discussed with the patient the importance of adhering to their current medication regimen as directed. Advised the patient that they should continue to work on eating a healthy balanced diet and staying active by exercising within their personal limits. Orders as listed above. Patient was advised to keep future appointments with their respective specialty care team(s). Patient had the opportunity to ask questions, all of which were answered to the best of my ability and with patient satisfaction. Patient understands and is agreeable with the care plan following today's visit. Patient is to schedule an appointment for any new or worsening symptoms. Go to ER for significant shortness of breath, chest pain, or uncontrolled pain or fever. I discussed with patient the risk and benefits of any medications that were prescribed today. I verified that the patient understands their medications, labs, and/or procedures.  The patient is doing well with current medication regimen and does not have any barriers to adherence. The patient's self-management abilities are good. Return in about 1 week (around 2/25/2022) for Diabetes Follow Up, Anxiety Follow Up. An electronic signature was used to authenticate this note.     --PHONG Pena - CNP on 2/18/2022 at 10:20 AM

## 2022-02-18 NOTE — TELEPHONE ENCOUNTER
Received call from [facility] at [facility] with Red Flag Complaint. Subjective: Caller states \"I got diagnosed with HTN a little over a month ago, ever since than I have been having anxiety. I am supposed to come in today at 1 for my BP and my AIC and I am so anxious that I can't function. I cant even go to work. \"     Current Symptoms: anxious    Onset: 1 month ago; worsening    Associated Symptoms: NA    Pain Severity: 0/10; N/A; none    Temperature: denies    What has been tried: Ativan    LMP: NA Pregnant: NA    Recommended disposition: to be seen today    Care advice provided, patient verbalizes understanding; denies any other questions or concerns; instructed to call back for any new or worsening symptoms. Patient/Caller agrees with recommended disposition; writer provided warm transfer to [facility] for appointment scheduling     Attention Provider: Thank you for allowing me to participate in the care of your patient. The patient was connected to triage in response to information provided to the [facility]. Please do not respond through this encounter as the response is not directed to a shared pool.         Reason for Disposition   Patient sounds very upset or troubled to the triager    Protocols used: ANXIETY AND PANIC ATTACK-ADULT-[place]

## 2022-02-18 NOTE — PATIENT INSTRUCTIONS
Patient Education        Anxiety Disorder: Care Instructions  Your Care Instructions     Anxiety is a normal reaction to stress. Difficult situations can cause you to have symptoms such as sweaty palms and a nervous feeling. In an anxiety disorder, the symptoms are far more severe. Constant worry, muscle tension, trouble sleeping, nausea and diarrhea, and other symptoms can make normal daily activities difficult or impossible. These symptoms may occur for no reason, and they can affect your work, school, or social life. Medicines, counseling, and self-care can all help. Follow-up care is a key part of your treatment and safety. Be sure to make and go to all appointments, and call your doctor if you are having problems. It's also a good idea to know your test results and keep a list of the medicines you take. How can you care for yourself at home? · Take medicines exactly as directed. Call your doctor if you think you are having a problem with your medicine. · Go to your counseling sessions and follow-up appointments. · Recognize and accept your anxiety. Then, when you are in a situation that makes you anxious, say to yourself, \"This is not an emergency. I feel uncomfortable, but I am not in danger. I can keep going even if I feel anxious. \"  · Be kind to your body:  ? Relieve tension with exercise or a massage. ? Get enough rest.  ? Avoid alcohol, caffeine, nicotine, and illegal drugs. They can increase your anxiety level and cause sleep problems. ? Learn and do relaxation techniques. See below for more about these techniques. · Engage your mind. Get out and do something you enjoy. Go to a funny movie, or take a walk or hike. Plan your day. Having too much or too little to do can make you anxious. · Keep a record of your symptoms. Discuss your fears with a good friend or family member, or join a support group for people with similar problems. Talking to others sometimes relieves stress.   · Get involved in social groups, or volunteer to help others. Being alone sometimes makes things seem worse than they are. · Get at least 30 minutes of exercise on most days of the week to relieve stress. Walking is a good choice. You also may want to do other activities, such as running, swimming, cycling, or playing tennis or team sports. Relaxation techniques  Do relaxation exercises 10 to 20 minutes a day. You can play soothing, relaxing music while you do them, if you wish. · Tell others in your house that you are going to do your relaxation exercises. Ask them not to disturb you. · Find a comfortable place, away from all distractions and noise. · Lie down on your back, or sit with your back straight. · Focus on your breathing. Make it slow and steady. · Breathe in through your nose. Breathe out through either your nose or mouth. · Breathe deeply, filling up the area between your navel and your rib cage. Breathe so that your belly goes up and down. · Do not hold your breath. · Breathe like this for 5 to 10 minutes. Notice the feeling of calmness throughout your whole body. As you continue to breathe slowly and deeply, relax by doing the following for another 5 to 10 minutes:  · Tighten and relax each muscle group in your body. You can begin at your toes and work your way up to your head. · Imagine your muscle groups relaxing and becoming heavy. · Empty your mind of all thoughts. · Let yourself relax more and more deeply. · Become aware of the state of calmness that surrounds you. · When your relaxation time is over, you can bring yourself back to alertness by moving your fingers and toes and then your hands and feet and then stretching and moving your entire body. Sometimes people fall asleep during relaxation, but they usually wake up shortly afterward. · Always give yourself time to return to full alertness before you drive a car or do anything that might cause an accident if you are not fully alert.  Never play a relaxation tape while you drive a car. When should you call for help? Call 911 anytime you think you may need emergency care. For example, call if:    · You feel you cannot stop from hurting yourself or someone else. Keep the numbers for these national suicide hotlines: 3-764-152-TALK (2-508.965.3120) and 8-417-BBLVXPN (8-135.624.6905). If you or someone you know talks about suicide or feeling hopeless, get help right away. Watch closely for changes in your health, and be sure to contact your doctor if:    · You have anxiety or fear that affects your life.     · You have symptoms of anxiety that are new or different from those you had before. Where can you learn more? Go to https://[employer]. org and sign in to your Charg[employer]aster account. Enter P754 in the Ongo box to learn more about \"Anxiety Disorder: Care Instructions. \"     If you do not have an account, please click on the \"Sign Up Now\" link. Current as of: September 23, 2020               Content Version: 12.9  © 2006-2021 [employer]. Care instructions adapted under license by [employer]. If you have questions about a medical condition or this instruction, always ask your healthcare professional. [address] any warranty or liability for your use of this information. Patient Education        Recovering From Depression: Care Instructions  Your Care Instructions     Taking good care of yourself is important as you recover from depression. In time, your symptoms will fade as your treatment takes hold. Do not give up. Instead, focus your energy on getting better. Your mood will improve. It just takes some time. Focus on things that can help you feel better, such as being with friends and family, eating well, and getting enough rest. But take things slowly. Do not do too much too soon. You will begin to feel better gradually.   Follow-up care is a key part of your treatment and safety. Be sure to make and go to all appointments, and call your doctor if you are having problems. It's also a good idea to know your test results and keep a list of the medicines you take. How can you care for yourself at home? Be realistic  · If you have a large task to do, break it up into smaller steps you can handle, and just do what you can. · You may want to put off important decisions until your depression has lifted. If you have plans that will have a major impact on your life, such as marriage, divorce, or a job change, try to wait a bit. Talk it over with friends and loved ones who can help you look at the overall picture first.  · Reaching out to people for help is important. Do not isolate yourself. Let your family and friends help you. Find someone you can trust and confide in, and talk to that person. · Be patient, and be kind to yourself. Remember that depression is not your fault and is not something you can overcome with willpower alone. Treatment is important for depression, just like for any other illness. Feeling better takes time, and your mood will improve little by little. Stay active  · Stay busy and get outside. Take a walk, or try some other light exercise. · Talk with your doctor about an exercise program. Exercise can help with mild depression. · Go to a movie or concert. Take part in a [employer] activity or other social gathering. Go to a ball game. · Ask a friend to have dinner with you. Take care of yourself  · Eat a balanced diet with plenty of fresh fruits and vegetables, whole grains, and lean protein. If you have lost your appetite, eat small snacks rather than large meals. · Avoid using illegal drugs or marijuana and drinking alcohol. Do not take medicines that have not been prescribed for you. They may interfere with medicines you may be taking for depression, or they may make your depression worse. · Take your medicines exactly as they are prescribed. You may start to feel better within 1 to 3 weeks of taking antidepressant medicine. But it can take as many as 6 to 8 weeks to see more improvement. If you have questions or concerns about your medicines, or if you do not notice any improvement by 3 weeks, talk to your doctor. · Continue to take your medicine after your symptoms improve. Taking your medicine for at least 6 months after you feel better can help keep you from getting depressed again. If this isn't the first time you have been depressed, your doctor may recommend you to take medicine even longer. · If you have any side effects from your medicine, tell your doctor. Many side effects are mild and will go away on their own after you have been taking the medicine for a few weeks. Some may last longer. Talk to your doctor if side effects are bothering you too much. You might be able to try a different medicine. · Continue counseling. It may help prevent depression from returning, especially if you've had multiple episodes of depression. Talk with your counselor if you are having a hard time attending your sessions or you think the sessions aren't working. Don't just stop going. · Get enough sleep. Talk to your doctor if you are having problems sleeping. · Avoid sleeping pills unless they are prescribed by the doctor treating your depression. Sleeping pills may make you groggy during the day, and they may interact with other medicine you are taking. · If you have any other illnesses, such as diabetes, heart disease, or high blood pressure, make sure to continue with your treatment. Tell your doctor about all of the medicines you take, including those with or without a prescription. · If you or someone you know talks about suicide, self-harm, or feeling hopeless, get help right away. Call the [facility] at 1-800-273-talk (1-595.913.6769) or text HOME to 748600 to access the Crisis Text Line.  Consider saving these numbers in your phone. When should you call for help? Call 911 anytime you think you may need emergency care. For example, call if:    · You feel like hurting yourself or someone else.     · Someone you know has depression and is about to attempt or is attempting suicide. Call your doctor now or seek immediate medical care if:    · You hear voices.     · Someone you know has depression and:  ? Starts to give away his or her possessions. ? Uses illegal drugs or drinks alcohol heavily. ? Talks or writes about death, including writing suicide notes or talking about guns, knives, or pills. ? Starts to spend a lot of time alone. ? Acts very aggressively or suddenly appears calm. Watch closely for changes in your health, and be sure to contact your doctor if:    · You do not get better as expected. Where can you learn more? Go to https://LitherapepicewThismoment.[employer]. org and sign in to your [employer] account. Enter W546 in the Panraven box to learn more about \"Recovering From Depression: Care Instructions. \"     If you do not have an account, please click on the \"Sign Up Now\" link. Current as of: June 16, 2021               Content Version: 13.1  © 2006-2021 [employer]. Care instructions adapted under license by [facility] ([facility]). If you have questions about a medical condition or this instruction, always ask your healthcare professional. [place] [address] any warranty or liability for your use of this information.

## 2022-02-22 ENCOUNTER — TELEMEDICINE (OUTPATIENT)
Dept: FAMILY MEDICINE CLINIC | Age: 62
End: 2022-02-22
Payer: COMMERCIAL

## 2022-02-22 DIAGNOSIS — F41.9 ANXIETY: Primary | ICD-10-CM

## 2022-02-22 DIAGNOSIS — M79.604 PAIN IN BOTH LOWER EXTREMITIES: ICD-10-CM

## 2022-02-22 DIAGNOSIS — M79.605 PAIN IN BOTH LOWER EXTREMITIES: ICD-10-CM

## 2022-02-22 DIAGNOSIS — G89.29 CHRONIC PAIN OF RIGHT KNEE: ICD-10-CM

## 2022-02-22 DIAGNOSIS — M25.561 CHRONIC PAIN OF RIGHT KNEE: ICD-10-CM

## 2022-02-22 DIAGNOSIS — I10 ESSENTIAL HYPERTENSION: ICD-10-CM

## 2022-02-22 PROCEDURE — 99214 OFFICE O/P EST MOD 30 MIN: CPT | Performed by: FAMILY MEDICINE

## 2022-02-22 RX ORDER — BUSPIRONE HYDROCHLORIDE 10 MG/1
5-10 TABLET ORAL 2 TIMES DAILY
Qty: 60 TABLET | Refills: 0 | Status: SHIPPED | OUTPATIENT
Start: 2022-02-22 | End: 2022-03-07 | Stop reason: ALTCHOICE

## 2022-02-22 RX ORDER — SERTRALINE HYDROCHLORIDE 100 MG/1
200 TABLET, FILM COATED ORAL DAILY
Qty: 180 TABLET | Refills: 1 | Status: SHIPPED
Start: 2022-02-22 | End: 2022-03-07 | Stop reason: ALTCHOICE

## 2022-02-22 RX ORDER — HYDROCODONE BITARTRATE AND ACETAMINOPHEN 7.5; 325 MG/1; MG/1
.5-1 TABLET ORAL EVERY 6 HOURS PRN
Qty: 60 TABLET | Refills: 0 | Status: SHIPPED | OUTPATIENT
Start: 2022-02-22 | End: 2022-03-30 | Stop reason: SDUPTHER

## 2022-02-22 NOTE — PROGRESS NOTES
2022    TELEHEALTH EVALUATION -- Audio/Visual (During [id number] public health emergency)    HPI:    Lety Quinones (:  1960) has requested an audio/video evaluation for the following concern(s):      Routine f/u - chronic pain/ htn  On toprol/ chlorthalidone then hyzaar   Taking toprol bid and hyzaar in am - amlodipine added as well  a1c 6.3% recently - prediabetes  bp being high \"freaked her out\"  Heart racing at times - machine not working well  Madeline Solano off anxiety - afraid to leave house  Still working - hard to work  Using ativan one daily - doesn't seem to help  Seeing therapist 3/8. Sleeps some but wakes up w/ heart racing   Hard to stop it. bp doing okay now. Still on diet - lost 27#  Metoprolol xl 25 twice daily  Amlodipine 5 am  hyzaar 100/25 daily pm.  Hard to relax - worrying a lot  buspar didn't help. Hard to function  Needs pain med for leg  BP Readings from Last 3 Encounters:   22 128/78   22 124/62   22 (!) 143/79     Pulse Readings from Last 3 Encounters:   22 76   22 71   22 66     Wt Readings from Last 3 Encounters:   22 213 lb (96.6 kg)   22 221 lb (100.2 kg)   22 225 lb (102.1 kg)         Review of Systems    Prior to Visit Medications    Medication Sig Taking? Authorizing Provider   amLODIPine (NORVASC) 5 MG tablet Take 1 tablet by mouth daily  Historical Provider, MD   LORazepam (ATIVAN) 1 MG tablet Take 1 tablet by mouth 2 times daily as needed for Anxiety for up to 30 days.   PHONG Evans CNP   losartan-hydroCHLOROthiazide (HYZAAR) 100-25 MG per tablet TAKE ONE TABLET BY MOUTH DAILY  Nahum PoPHONG - CNP   sertraline (ZOLOFT) 100 MG tablet TAKE 1 AND 1/2 TABLET BY MOUTH DAILY  PHONG Hester - CNP   estradiol (ESTRACE VAGINAL) 0.1 MG/GM vaginal cream Place 1 g vaginally daily For 2 weeks then twice weekly  Dick Doyle MD   metoprolol succinate (TOPROL XL) 25 MG extended release tablet 25MG IN AM AND 25- 50MG IN PM as directed  Patient taking differently: 25MG IN AM AND 25 MG IN PM as directed  Lefty Marley MD   ARIPiprazole (ABILIFY) 5 MG tablet 1 TABLET BY MOUTH DAILY  Lefty Marley MD   atorvastatin (LIPITOR) 40 MG tablet 1 po daily  Patient taking differently: Take 20 mg by mouth daily   Lefty Marley MD       Social History     Tobacco Use    Smoking status: Former Smoker     Packs/day: 1.00     Years: 30.00     Pack years: 30.00     Start date: 10/30/1974     Quit date: 10/30/2004     Years since quittin.3    Smokeless tobacco: Never Used    Tobacco comment: Year started: 65 Year Quit: ; Educated not to start back. Vaping Use    Vaping Use: Never used   Substance Use Topics    Alcohol use: No     Comment: occasionally     Drug use: No        PHYSICAL EXAMINATION:  [ INSTRUCTIONS:  \"[x]\" Indicates a positive item  \"[]\" Indicates a negative item  -- DELETE ALL ITEMS NOT EXAMINED]  Vital Signs: (As obtained by patient/caregiver or practitioner observation)    Blood pressure-  Heart rate-    Respiratory rate-    Temperature-  Pulse oximetry-     Constitutional: [x] Appears well-developed and well-nourished [] No apparent distress      [] Abnormal-   Mental status  [x] Alert and awake  [] Oriented to person/place/time []Able to follow commands      Eyes:  EOM    []  Normal  [] Abnormal-  Sclera  []  Normal  [] Abnormal -         Discharge []  None visible  [] Abnormal -    HENT:   [x] Normocephalic, atraumatic.   [] Abnormal   [] Mouth/Throat: Mucous membranes are moist.     External Ears [] Normal  [] Abnormal-     Neck: [] No visualized mass     Pulmonary/Chest: [x] Respiratory effort normal.  [] No visualized signs of difficulty breathing or respiratory distress        [] Abnormal-      Musculoskeletal:   [] Normal gait with no signs of ataxia         [] Normal range of motion of neck        [] Abnormal-       Neurological:        [x] No Facial Asymmetry (Cranial nerve 7 motor function) (limited exam to video visit)          [] No gaze palsy        [] Abnormal-         Skin:        [x] No significant exanthematous lesions or discoloration noted on facial skin         [] Abnormal-            Psychiatric:       [x] Normal Affect [] No Hallucinations        [] Abnormal-     Other pertinent observable physical exam findings-     ASSESSMENT/PLAN:   Diagnosis Orders   1. Anxiety     2. Pain in both lower extremities  HYDROcodone-acetaminophen (NORCO) 7.5-325 MG per tablet   3. Chronic pain of right knee  HYDROcodone-acetaminophen (NORCO) 7.5-325 MG per tablet   4. Essential hypertension       Refill norco  Caution using benzo w/ norco but tolerating okay right now  Using lorazepam bid prn  Increase zoloft to 200mg/d and add buspar 5 bid  Cont abilify 5/d for now  F/u therapist  F/u 4 weeks w/ me in office to check bp  Will try to reduce bp meds as able - may be high from anxiety dw/ pt  bs stable - congrats on weight loss  Work excuse 2 weeks    oarrs reviewed and results c/w rx'd meds  On lorazepam recently 1mg #60 filled 2/18 after #45 given 1/18  norco 7.5 #60 given 1/22    Bacilio Zuniga, was evaluated through a synchronous (real-time) audio-video encounter. The patient (or guardian if applicable) is aware that this is a billable service, which includes applicable co-pays. This Virtual Visit was conducted with patient's (and/or legal guardian's) consent. The visit was conducted pursuant to the emergency declaration under the [address] [place] [address] authority and the [employer] and [employer] General Act. Patient identification was verified, and a caregiver was present when appropriate. The patient was located at home in a state where the provider was licensed to provide care.       Total time spent on this encounter: 21 or more minutes were spent on the digital evaluation and management of this patient. --Ghassan Lucia MD on 2/22/2022 at 7:59 AM    An electronic signature was used to authenticate this note.

## 2022-02-28 ENCOUNTER — OFFICE VISIT (OUTPATIENT)
Dept: FAMILY MEDICINE CLINIC | Age: 62
End: 2022-02-28
Payer: COMMERCIAL

## 2022-02-28 ENCOUNTER — NURSE TRIAGE (OUTPATIENT)
Dept: OTHER | Facility: CLINIC | Age: 62
End: 2022-02-28

## 2022-02-28 VITALS
WEIGHT: 207 LBS | TEMPERATURE: 97.4 F | SYSTOLIC BLOOD PRESSURE: 122 MMHG | HEIGHT: 63 IN | BODY MASS INDEX: 36.68 KG/M2 | RESPIRATION RATE: 18 BRPM | OXYGEN SATURATION: 99 % | HEART RATE: 67 BPM | DIASTOLIC BLOOD PRESSURE: 78 MMHG

## 2022-02-28 DIAGNOSIS — F41.0 PANIC ATTACKS: ICD-10-CM

## 2022-02-28 DIAGNOSIS — F41.9 ANXIETY: Primary | ICD-10-CM

## 2022-02-28 DIAGNOSIS — I49.9 IRREGULAR HEART BEAT: ICD-10-CM

## 2022-02-28 PROCEDURE — 93000 ELECTROCARDIOGRAM COMPLETE: CPT | Performed by: NURSE PRACTITIONER

## 2022-02-28 PROCEDURE — 99213 OFFICE O/P EST LOW 20 MIN: CPT | Performed by: NURSE PRACTITIONER

## 2022-02-28 RX ORDER — ALPRAZOLAM 0.5 MG/1
0.5 TABLET ORAL 2 TIMES DAILY PRN
Qty: 60 TABLET | Refills: 0 | Status: SHIPPED
Start: 2022-02-28 | End: 2022-03-04 | Stop reason: DRUGHIGH

## 2022-02-28 SDOH — ECONOMIC STABILITY: FOOD INSECURITY: WITHIN THE PAST 12 MONTHS, YOU WORRIED THAT YOUR FOOD WOULD RUN OUT BEFORE YOU GOT MONEY TO BUY MORE.: NEVER TRUE

## 2022-02-28 SDOH — ECONOMIC STABILITY: FOOD INSECURITY: WITHIN THE PAST 12 MONTHS, THE FOOD YOU BOUGHT JUST DIDN'T LAST AND YOU DIDN'T HAVE MONEY TO GET MORE.: NEVER TRUE

## 2022-02-28 SDOH — ECONOMIC STABILITY: TRANSPORTATION INSECURITY
IN THE PAST 12 MONTHS, HAS LACK OF TRANSPORTATION KEPT YOU FROM MEETINGS, WORK, OR FROM GETTING THINGS NEEDED FOR DAILY LIVING?: NO

## 2022-02-28 ASSESSMENT — SOCIAL DETERMINANTS OF HEALTH (SDOH): HOW HARD IS IT FOR YOU TO PAY FOR THE VERY BASICS LIKE FOOD, HOUSING, MEDICAL CARE, AND HEATING?: NOT HARD AT ALL

## 2022-02-28 ASSESSMENT — ENCOUNTER SYMPTOMS
WHEEZING: 0
SHORTNESS OF BREATH: 0

## 2022-02-28 NOTE — PATIENT INSTRUCTIONS
Patient Education        alprazolam  Pronunciation:  yoanna cardozo  Brand:  Xanax, Xanax XR  What is the most important information I should know about alprazolam?  Alprazolam can slow or stop your breathing, especially if you have recently used an opioid medication, alcohol, or other drugs that can slow your breathing. MISUSE OF THIS MEDICINE CAN CAUSE ADDICTION, OVERDOSE, OR DEATH. Keep the medication in a place where others cannot get to it. What is alprazolam?  Alprazolam is a benzodiazepine (arianna-elijah-dye-AZE-eh-peen) that is used to treat anxiety disorders, panic disorders, and anxiety caused by depression. It is dangerous to purchase alprazolam on the Internet or outside the [place]. The sale and distribution of medicines outside the [place] does not comply with safe-use regulations of the Food and Drug Administration (FDA). These medications may contain dangerous ingredients, or may not be distributed by a licensed pharmacy. Alprazolam may also be used for purposes not listed in this medication guide. What should I discuss with my healthcare provider before taking alprazolam?  You should not take alprazolam if:  · you also take itraconazole or ketoconazole (antifungal medicines); or  · you have a history of allergic reaction to any benzodiazepine (alprazolam, diazepam, lorazepam, Ativan, Klonopin, Restoril, Tranxene, Valium, Versed, Xanax, and others). Tell your doctor if you have ever had:  · breathing problems such as COPD (chronic obstructive pulmonary disease) or sleep apnea (breathing that stops during sleep);  · drug or alcohol addiction;  · depression, mood problems, or suicidal thoughts or behavior; or  · kidney or liver disease (especially alcoholic liver disease). Tell your doctor if you are pregnant or plan to become pregnant. Alprazolam may harm an unborn baby. Avoid taking this medicine during the first trimester of pregnancy.    If you use alprazolam while you are pregnant, your baby could become dependent on the drug. This can cause life-threatening withdrawal symptoms in the baby after it is born. Babies born dependent on habit-forming medicine may need medical treatment for several weeks. You should not breastfeed while using alprazolam.  Alprazolam is not approved for use by anyone younger than 25years old. How should I take alprazolam?  Follow the directions on your prescription label and read all medication guides. Your doctor may occasionally change your dose. Never use alprazolam in larger amounts, or for longer than prescribed. Tell your doctor if you feel an increased urge to use more of this medicine. Never share this medicine with another person, especially someone with a history of drug abuse or addiction. MISUSE CAN CAUSE ADDICTION, OVERDOSE, OR DEATH. Keep the medication in a place where others cannot get to it. Selling or giving away this medicine is against the law. Measure liquid medicine carefully. Use the dosing syringe provided, or use a medicine dose-measuring device (not a kitchen spoon). Swallow the extended-release tablet whole and do not crush, chew, or break it. Do not swallow the orally disintegrating tablet whole. Allow it to dissolve in your mouth without chewing. Alprazolam is usually taken for no longer than 4 months to treat anxiety disorder, and for no longer than 10 weeks to treat panic disorder. Follow your doctor's dosing instructions very carefully. Call your doctor if your symptoms do not improve, or if they get worse. If you use this medicine long-term, you may need frequent medical tests. Do not stop using alprazolam suddenly, or you could have unpleasant withdrawal symptoms. Follow your doctor's instructions about tapering your dose. Store at room temperature away from moisture, heat, and light. Keep track of your medicine. You should be aware if anyone is using it improperly or without a prescription.   Throw away any alprazolam liquid not used within 90 days. What happens if I miss a dose? Take the medicine as soon as you can, but skip the missed dose if it is almost time for your next dose. Do not take two doses at one time. What happens if I overdose? Seek emergency medical attention or call the Poison Help line at 1-392.726.9405. An overdose of alprazolam can be fatal.  Overdose symptoms may include extreme drowsiness, confusion, muscle weakness, loss of balance or coordination, feeling light-headed, and fainting. What should I avoid while taking alprazolam?  Avoid drinking alcohol. Dangerous side effects or death could occur. Avoid driving or hazardous activity until you know how this medicine will affect you. Dizziness or drowsiness can cause falls, accidents, or severe injuries. Grapefruit may interact with alprazolam and lead to unwanted side effects. Avoid the use of grapefruit products. What are the possible side effects of alprazolam?  Get emergency medical help if you have signs of an allergic reaction: hives; difficult breathing; swelling of your face, lips, tongue, or throat. Alprazolam can slow or stop your breathing, especially if you have recently used an opioid medication, alcohol, or other drugs that can slow your breathing. A person caring for you should seek emergency medical attention if you have weak or shallow breathing, if you are hard to wake up, or if you stop breathing. Call your doctor at once if you have:  · weak or shallow breathing;  · a light-headed feeling, like you might pass out;  · a seizure;  · hallucinations, risk-taking behavior;  · increased energy, decreased need for sleep;  · racing thoughts, being agitated or talkative;  · double vision; or  · jaundice (yellowing of the skin or eyes). The sedative effects of alprazolam may last longer in older adults. Accidental falls are common in elderly patients who take benzodiazepines. Use caution to avoid falling or accidental injury.   Common side assist licensed healthcare practitioners in caring for their patients and/or to serve consumers viewing this service as a supplement to, and not a substitute for, the expertise, skill, knowledge and judgment of healthcare practitioners. The absence of a warning for a given drug or drug combination in no way should be construed to indicate that the drug or drug combination is safe, effective or appropriate for any given patient. [facility] does not assume any responsibility for any aspect of healthcare administered with the aid of information [facility] provides. The information contained herein is not intended to cover all possible uses, directions, precautions, warnings, drug interactions, allergic reactions, or adverse effects. If you have questions about the drugs you are taking, check with your doctor, nurse or pharmacist.  Copyright 7231-6162 [address]: 12.02. Revision date: 1/5/2021. Care instructions adapted under license by [facility] ([facility]). If you have questions about a medical condition or this instruction, always ask your healthcare professional. [place] [address] any warranty or liability for your use of this information.

## 2022-02-28 NOTE — PROGRESS NOTES
Guillaume Nolen (:  1960) is a 64 y.o. female,Established patient, here for evaluation of the following chief complaint(s): Anxiety (SAME DAY ANXIETY )      ASSESSMENT/PLAN:  1. Anxiety  -Discussed options with the patient. GeneSight is being performed in office. Avoid changing sertraline until results come back. Increase buspirone to 10 mg twice daily. Change lorazepam to alprazolam.  Patient gave remaining lorazepam to me and #71 were disposed of with Fabiola Mace MA as witness. The medication was counted after the patient left. Assuming she had excess from #45 picked up in January and then picked up the refill in February. Starting alprazolam at 0.5 mg BID. Educated to the patient that if she does not see improvement, can increase to 1 mg BID. Will need to inform office of when medication is running out if this is done.  -Currently has a follow-up scheduled on 3/30. Will consider moving this up based upon GeneSight results. -     ALPRAZolam (XANAX) 0.5 MG tablet; Take 1 tablet by mouth 2 times daily as needed for Anxiety for up to 30 days. , Disp-60 tablet, R-0Normal  2. Irregular heart beat  -EKG was normal in office today. No additional intervention indicated. Likely related to anxiety. -     EKG 12 Lead  3. Panic attacks  Alprazolam and increase in BuSpar as above    Return in about 30 days (around 3/30/2022) for Follow-up anxiety. SUBJECTIVE/OBJECTIVE:  QUIN Payan presents today regarding anxiety. She has been seen from multiple appointments over the past 14 days for this. She has had multiple medication adjustments. Currently taking sertraline 200 mg daily, buspirone 5 mg twice daily, and lorazepam 1 mg daily. The sertraline was a recent increase. She states that despite this, she continues to have symptoms. Symptoms have essentially been unchanged despite the increase in medication. She states that she has been waking up with an increased heart rate. This is made her feel panicky. She has a history of high blood pressure and had been checking her blood pressure at home, but this was also inducing extra anxiety. She is no longer checking at this point. Currently takes metoprolol 25 mg twice daily along with Hyzaar. Patient does have an appointment scheduled with a counselor on the 8th. She has been finding that she continues to have lack of motivation. Panic attacks. She was afraid to increase the lorazepam to twice daily due to fear of getting addicted to it. She also does not feel like it helps. Review of Systems   Constitutional: Negative for chills and fever. Respiratory: Negative for shortness of breath and wheezing. Cardiovascular: Positive for palpitations. Negative for chest pain. Neurological: Negative for dizziness, weakness, light-headedness and numbness. Psychiatric/Behavioral: Positive for dysphoric mood. Negative for decreased concentration, self-injury, sleep disturbance and suicidal ideas. The patient is nervous/anxious. Physical Exam  Constitutional:       General: She is not in acute distress. Appearance: Normal appearance. She is obese. Cardiovascular:      Pulses: Normal pulses. Heart sounds: Normal heart sounds. No murmur heard. No gallop. Pulmonary:      Effort: Pulmonary effort is normal.      Breath sounds: Normal breath sounds. No wheezing. Neurological:      Mental Status: She is alert and oriented to person, place, and time. Psychiatric:         Mood and Affect: Mood normal.         Behavior: Behavior normal.         Thought Content: Thought content normal.         Judgment: Judgment normal.      Comments: Tearful       EKG: normal sinus rhythm, negative precordial t waves. When compared to an EKG dated 10/25/2019, there is no longer rate variation, P axis, rotation, and concern for pulmonary disease. This dictation was generated by voice recognition computer software.   Although all attempts are made to edit

## 2022-02-28 NOTE — TELEPHONE ENCOUNTER
Received call from [place] at [facility] with Red Flag Complaint. Subjective: Caller states \"Heart feels like it is racing. I do have anxiety but I think it is getting worse. A wake up in the morning and it is there, I am not even out of bed yet. I know it is anxiety because if I lie down to watch a movie it goes away . My mind is somewhere else. I Just diagnosed with blood pressure and this is what started all this anxiety. \"     Current Symptoms: feels like racing heart      Onset: a couple weeks     Pain Severity: none     Temperature: none     What has been tried: taking prescribed medication     Recommended disposition: See in Office Today    Care advice provided, patient verbalizes understanding; denies any other questions or concerns; instructed to call back for any new or worsening symptoms. Patient/Caller agrees with recommended disposition; writer provided warm transfer to Saira Nelson at [facility] for appointment scheduling     Attention Provider: Thank you for allowing me to participate in the care of your patient. The patient was connected to triage in response to information provided to the ECC/PSC. Please do not respond through this encounter as the response is not directed to a shared pool.     Reason for Disposition   Patient wants to be seen   Age > 60 years (Exception: brief heartbeat symptoms that went away and now feels well)    Protocols used: HEART RATE AND HEARTBEAT QUESTIONS-ADULT-[place]

## 2022-03-01 ENCOUNTER — HOSPITAL ENCOUNTER (EMERGENCY)
Age: 62
Discharge: HOME | End: 2022-03-01
Attending: EMERGENCY MEDICINE
Payer: COMMERCIAL

## 2022-03-01 ENCOUNTER — APPOINTMENT (OUTPATIENT)
Dept: GENERAL RADIOLOGY | Age: 62
End: 2022-03-01
Payer: COMMERCIAL

## 2022-03-01 ENCOUNTER — TELEPHONE (OUTPATIENT)
Dept: FAMILY MEDICINE CLINIC | Age: 62
End: 2022-03-01

## 2022-03-01 VITALS
HEART RATE: 75 BPM | RESPIRATION RATE: 16 BRPM | OXYGEN SATURATION: 96 % | DIASTOLIC BLOOD PRESSURE: 67 MMHG | SYSTOLIC BLOOD PRESSURE: 145 MMHG | TEMPERATURE: 98.9 F

## 2022-03-01 DIAGNOSIS — R07.9 CHEST PAIN, UNSPECIFIED TYPE: ICD-10-CM

## 2022-03-01 DIAGNOSIS — F41.1 ANXIETY STATE: Primary | ICD-10-CM

## 2022-03-01 DIAGNOSIS — E87.6 HYPOKALEMIA: ICD-10-CM

## 2022-03-01 LAB
A/G RATIO: 1.8 (ref 1.1–2.2)
ALBUMIN SERPL-MCNC: 4.7 G/DL (ref 3.4–5)
ALP BLD-CCNC: 47 U/L (ref 40–129)
ALT SERPL-CCNC: 21 U/L (ref 10–40)
ANION GAP SERPL CALCULATED.3IONS-SCNC: 14 MMOL/L (ref 3–16)
AST SERPL-CCNC: 21 U/L (ref 15–37)
BASOPHILS ABSOLUTE: 0.1 K/UL (ref 0–0.2)
BASOPHILS RELATIVE PERCENT: 0.9 %
BILIRUB SERPL-MCNC: 0.4 MG/DL (ref 0–1)
BUN BLDV-MCNC: 21 MG/DL (ref 7–20)
CALCIUM SERPL-MCNC: 9.7 MG/DL (ref 8.3–10.6)
CHLORIDE BLD-SCNC: 95 MMOL/L (ref 99–110)
CO2: 27 MMOL/L (ref 21–32)
CREAT SERPL-MCNC: 0.7 MG/DL (ref 0.6–1.2)
EKG ATRIAL RATE: 85 BPM
EKG DIAGNOSIS: NORMAL
EKG P AXIS: 75 DEGREES
EKG P-R INTERVAL: 172 MS
EKG Q-T INTERVAL: 380 MS
EKG QRS DURATION: 86 MS
EKG QTC CALCULATION (BAZETT): 452 MS
EKG R AXIS: -7 DEGREES
EKG T AXIS: -20 DEGREES
EKG VENTRICULAR RATE: 85 BPM
EOSINOPHILS ABSOLUTE: 0.1 K/UL (ref 0–0.6)
EOSINOPHILS RELATIVE PERCENT: 1.2 %
GFR AFRICAN AMERICAN: >60
GFR NON-AFRICAN AMERICAN: >60
GLUCOSE BLD-MCNC: 119 MG/DL (ref 70–99)
HCT VFR BLD CALC: 40.4 % (ref 36–48)
HEMOGLOBIN: 13.6 G/DL (ref 12–16)
LYMPHOCYTES ABSOLUTE: 1.5 K/UL (ref 1–5.1)
LYMPHOCYTES RELATIVE PERCENT: 21.3 %
MAGNESIUM: 1.7 MG/DL (ref 1.8–2.4)
MCH RBC QN AUTO: 29.8 PG (ref 26–34)
MCHC RBC AUTO-ENTMCNC: 33.7 G/DL (ref 31–36)
MCV RBC AUTO: 88.2 FL (ref 80–100)
MONOCYTES ABSOLUTE: 0.5 K/UL (ref 0–1.3)
MONOCYTES RELATIVE PERCENT: 7.3 %
NEUTROPHILS ABSOLUTE: 5 K/UL (ref 1.7–7.7)
NEUTROPHILS RELATIVE PERCENT: 69.3 %
PDW BLD-RTO: 14.5 % (ref 12.4–15.4)
PLATELET # BLD: 256 K/UL (ref 135–450)
PMV BLD AUTO: 9.9 FL (ref 5–10.5)
POTASSIUM REFLEX MAGNESIUM: 2.8 MMOL/L (ref 3.5–5.1)
RBC # BLD: 4.58 M/UL (ref 4–5.2)
SODIUM BLD-SCNC: 136 MMOL/L (ref 136–145)
TOTAL PROTEIN: 7.3 G/DL (ref 6.4–8.2)
TROPONIN: <0.01 NG/ML
TSH REFLEX: 1.91 UIU/ML (ref 0.27–4.2)
WBC # BLD: 7.2 K/UL (ref 4–11)

## 2022-03-01 PROCEDURE — 83735 ASSAY OF MAGNESIUM: CPT

## 2022-03-01 PROCEDURE — 93010 ELECTROCARDIOGRAM REPORT: CPT | Performed by: INTERNAL MEDICINE

## 2022-03-01 PROCEDURE — 99283 EMERGENCY DEPT VISIT LOW MDM: CPT

## 2022-03-01 PROCEDURE — 80053 COMPREHEN METABOLIC PANEL: CPT

## 2022-03-01 PROCEDURE — 85025 COMPLETE CBC W/AUTO DIFF WBC: CPT

## 2022-03-01 PROCEDURE — 6370000000 HC RX 637 (ALT 250 FOR IP): Performed by: EMERGENCY MEDICINE

## 2022-03-01 PROCEDURE — 93005 ELECTROCARDIOGRAM TRACING: CPT | Performed by: EMERGENCY MEDICINE

## 2022-03-01 PROCEDURE — 84443 ASSAY THYROID STIM HORMONE: CPT

## 2022-03-01 PROCEDURE — 84484 ASSAY OF TROPONIN QUANT: CPT

## 2022-03-01 PROCEDURE — 71046 X-RAY EXAM CHEST 2 VIEWS: CPT

## 2022-03-01 RX ORDER — POTASSIUM CHLORIDE 20 MEQ/1
20 TABLET, EXTENDED RELEASE ORAL DAILY
Qty: 5 TABLET | Refills: 0 | Status: SHIPPED | OUTPATIENT
Start: 2022-03-01 | End: 2022-04-18

## 2022-03-01 RX ORDER — POTASSIUM CHLORIDE 20 MEQ/1
40 TABLET, EXTENDED RELEASE ORAL ONCE
Status: COMPLETED | OUTPATIENT
Start: 2022-03-01 | End: 2022-03-01

## 2022-03-01 RX ORDER — LORAZEPAM 1 MG/1
1 TABLET ORAL ONCE
Status: COMPLETED | OUTPATIENT
Start: 2022-03-01 | End: 2022-03-01

## 2022-03-01 RX ORDER — LANOLIN ALCOHOL/MO/W.PET/CERES
400 CREAM (GRAM) TOPICAL DAILY
Status: DISCONTINUED | OUTPATIENT
Start: 2022-03-01 | End: 2022-03-01 | Stop reason: HOSPADM

## 2022-03-01 RX ADMIN — Medication 400 MG: at 14:47

## 2022-03-01 RX ADMIN — POTASSIUM CHLORIDE 40 MEQ: 20 TABLET, EXTENDED RELEASE ORAL at 14:47

## 2022-03-01 RX ADMIN — LORAZEPAM 1 MG: 1 TABLET ORAL at 13:30

## 2022-03-01 ASSESSMENT — PAIN - FUNCTIONAL ASSESSMENT: PAIN_FUNCTIONAL_ASSESSMENT: 0-10

## 2022-03-01 ASSESSMENT — PAIN SCALES - GENERAL: PAINLEVEL_OUTOF10: 7

## 2022-03-01 ASSESSMENT — PAIN DESCRIPTION - LOCATION: LOCATION: CHEST

## 2022-03-01 NOTE — ED TRIAGE NOTES
Patient c/o uncontrolled anxiety. Chest pain and shortness of breath. Has been seen by PCP and several meds tried, but nothing helps.  Denies SI/HI

## 2022-03-01 NOTE — ED NOTES
D/C: Order noted for d/c. Pt confirmed d/c paperwork & 1 RX have correct name. Discharge and education instructions reviewed with patient. Teach-back successful. Pt verbalized understanding and signed d/c papers. Pt denied questions at this time. No acute distress noted. Patient instructed to follow-up as noted - return to emergency department if symptoms worsen. Patient verbalized understanding. Discharged per EDMD with discharge instructions. Pt discharged to private vehicle. Patient stable upon departure. Thanked patient for choosing [facility] for care. Provider aware of patient pain at time of discharge.        Tone Blanco RN  03/01/22 [id number]

## 2022-03-01 NOTE — TELEPHONE ENCOUNTER
----- Message from Viky Feliz sent at 3/1/2022  3:57 PM EST -----  Subject: Medication Problem    QUESTIONS  Name of Medication? ALPRAZolam (XANAX) 0.5 MG tablet  Patient-reported dosage and instructions? 1 tablet twice a day  What question or problem do you have with the medication? PT was rushed to   ED [facility]) with anxiety attack, could not breathe. ED physician   stated she could take 2 Xanax, twice a day if needed. She will then run   out sooner, and does not know how to proceed. Please advise pt. Preferred Pharmacy? [facility] [address] [facility]  Pharmacy phone number (if available)? [phone]  Additional Information for Provider?   ---------------------------------------------------------------------------  --------------  CALL BACK INFO  What is the best way for the office to contact you? OK to leave message on   voicemail  Preferred Call Back Phone Number? [phone]  ---------------------------------------------------------------------------  --------------  SCRIPT ANSWERS  Relationship to Patient?  Self

## 2022-03-01 NOTE — TELEPHONE ENCOUNTER
Outpatient Medication Detail     Disp Refills Start End    LORazepam (ATIVAN) 1 MG tablet 60 tablet 0 2/18/2022 3/20/2022    Sig - Route: Take 1 tablet by mouth 2 times daily as needed for Anxiety for up to 30 days. - Oral    Sent to pharmacy as: LORazepam 1 MG Oral Tablet (ATIVAN)    E-Prescribing Status: Receipt confirmed by pharmacy (2/18/2022 10:15 AM EST)      Outpatient Medication Detail     Disp Refills Start End    ALPRAZolam (XANAX) 0.5 MG tablet 60 tablet 0 2/28/2022 3/30/2022    Sig - Route: Take 1 tablet by mouth 2 times daily as needed for Anxiety for up to 30 days. - Oral    Sent to pharmacy as: ALPRAZolam 0.5 MG Oral Tablet Dionisio Huertas)    E-Prescribing Status: Receipt confirmed by pharmacy (2/28/2022  2:24 PM EST)      Outpatient Medication Detail     Disp Refills Start End    sertraline (ZOLOFT) 100 MG tablet 180 tablet 1 2/22/2022     Sig - Route: Take 2 tablets by mouth daily - Oral    Sent to pharmacy as: Sertraline HCl 100 MG Oral Tablet (ZOLOFT)    E-Prescribing Status: Receipt confirmed by pharmacy (2/22/2022  8:22 AM EST)      Outpatient Medication Detail     Disp Refills Start End    busPIRone (BUSPAR) 10 MG tablet 60 tablet 0 2/22/2022 3/24/2022    Sig - Route:  Take 0.5-1 tablets by mouth 2 times daily - Oral    Sent to pharmacy as: busPIRone HCl 10 MG Oral Tablet (BUSPAR)    E-Prescribing Status: Receipt confirmed by pharmacy (2/22/2022  8:22 AM EST)

## 2022-03-01 NOTE — ED PROVIDER NOTES
[facility]    CHIEF COMPLAINT  Anxiety (Patient c/o uncontrolled anxiety. Chest pain and shortness of breath. Has been seen by PCP and several meds tried, but nothing helps. Denies SI/HI)       HISTORY OF PRESENT ILLNESS  Suman Naranjo is a 64 y.o. female who presents to the ED who presents with complaint of anxiety. This is been going on for at least several weeks, after the patient was diagnosed with hypertension. She has been started on Xanax and intends to follow with a psychologist in several days. She complains of chest pain that only occurs when she is anxious, midsternal, poorly characterized, no radiation, no exacerbating or mitigating factors. She denies fever, shortness of breath, nausea, vomiting, diarrhea, abdominal pain. Denies suicidal ideation, homicidal ideation, or hallucinations. Last took a Xanax this morning around 7 AM.    No other complaints, modifying factors or associated symptoms. I have reviewed the following from the nursing documentation:    Past Medical History:   Diagnosis Date    ADD (attention deficit disorder)     Cervical dysplasia     Chronic leg pain     Depression     Diabetes mellitus, type 2 9/16/2013    Fibrocystic breast     Genital herpes 1994    Hemorrhoid     Hyperlipidemia     Metabolic syndrome     Mood disorder (HCC)     MRSA cellulitis 2007    facial    Obesity     Panic attacks     Plantar fasciitis     Recurrent UTI     Urethral stenosis     Urinary incontinence, mixed      Past Surgical History:   Procedure Laterality Date    CARPAL TUNNEL RELEASE Right 9/26/13    CHOLECYSTECTOMY      HYSTERECTOMY, TOTAL ABDOMINAL  10/2006    LABIAL ADHESION LYSIS  10/2006    OTHER SURGICAL HISTORY      No problems with anesthesia, no problems with healing, no problems with blood clots after surgery.       OTHER SURGICAL HISTORY  5/95    lipoma removal under arm    PLANTAR FASCIA SURGERY  October 2009    MRSA after surgery. Family History   Problem Relation Age of Onset    Diabetes Mother     Drug Abuse Mother     Psoriasis Mother     Asthma Mother    Metzger COPD Mother     Other Mother         shingles; cervical dysplasia    Heart Disease Father     Diabetes Father     Other Father         kidney stones    Asthma Maternal Aunt     Diabetes Maternal Aunt     Heart Disease Maternal Aunt     Other Maternal Aunt         hep B/cirrhosis    Diabetes Other     Other Sister          gallstones    Diabetes Sister     Cancer Sister          hyst d/t Ca    Scoliosis Son     Other Son         hx of Cat scratch disease?  Heart Disease Son         cardiomyopathy    Asthma Son      Social History     Socioeconomic History    Marital status:      Spouse name: Not on file    Number of children: 3    Years of education: Not on file    Highest education level: Not on file   Occupational History    Occupation:    Tobacco Use    Smoking status: Former Smoker     Packs/day: 1.00     Years: 30.00     Pack years: 30.00     Start date: 10/30/1974     Quit date: 10/30/2004     Years since quittin.3    Smokeless tobacco: Never Used    Tobacco comment: Year started: 65 Year Quit: ; Educated not to start back. Vaping Use    Vaping Use: Never used   Substance and Sexual Activity    Alcohol use: No     Comment: occasionally     Drug use: No    Sexual activity: Never     Partners: Male   Other Topics Concern    Not on file   Social History Narrative    Not on file     Social Determinants of Health     Financial Resource Strain: Low Risk     Difficulty of Paying Living Expenses: Not hard at all   Food Insecurity: No Food Insecurity    Worried About Running Out of Food in the Last Year: Never true    Anna of Food in the Last Year: Never true   Transportation Needs: No Transportation Needs    Lack of Transportation (Medical):  No    Lack of Transportation (Non-Medical): No   Physical Activity:     Days of Exercise per Week: Not on file    Minutes of Exercise per Session: Not on file   Stress:     Feeling of Stress : Not on file   Social Connections:     Frequency of Communication with Friends and Family: Not on file    Frequency of Social Gatherings with Friends and Family: Not on file    Attends Shinto Services: Not on file    Active Member of [address] [place] [address] or Organizations: Not on file    Attends Club or Organization Meetings: Not on file    Marital Status: Not on file   Intimate Partner Violence:     Fear of Current or Ex-Partner: Not on file    Emotionally Abused: Not on file    Physically Abused: Not on file    Sexually Abused: Not on file   Housing Stability:     Unable to Pay for Housing in the Last Year: Not on file    Number of [place] in the Last Year: Not on file    Unstable Housing in the Last Year: Not on file     No current facility-administered medications for this encounter. Current Outpatient Medications   Medication Sig Dispense Refill    potassium chloride (KLOR-CON M) 20 MEQ extended release tablet Take 1 tablet by mouth daily for 5 days 5 tablet 0    ALPRAZolam (XANAX) 0.5 MG tablet Take 1 tablet by mouth 2 times daily as needed for Anxiety for up to 30 days. 60 tablet 0    sertraline (ZOLOFT) 100 MG tablet Take 2 tablets by mouth daily 180 tablet 1    busPIRone (BUSPAR) 10 MG tablet Take 0.5-1 tablets by mouth 2 times daily 60 tablet 0    HYDROcodone-acetaminophen (NORCO) 7.5-325 MG per tablet Take 0.5-1 tablets by mouth every 6 hours as needed for Pain for up to 30 days. 60 tablet 0    amLODIPine (NORVASC) 5 MG tablet Take 1 tablet by mouth daily      LORazepam (ATIVAN) 1 MG tablet Take 1 tablet by mouth 2 times daily as needed for Anxiety for up to 30 days.  60 tablet 0    losartan-hydroCHLOROthiazide (HYZAAR) 100-25 MG per tablet TAKE ONE TABLET BY MOUTH DAILY 30 tablet 0    estradiol (ESTRACE VAGINAL) 0.1 MG/GM vaginal cream Place 1 g vaginally daily For 2 weeks then twice weekly 1 each 11    metoprolol succinate (TOPROL XL) 25 MG extended release tablet 25MG IN AM AND 25- 50MG IN PM as directed (Patient taking differently: 25MG IN AM AND 25 MG IN PM as directed) 90 tablet 2    atorvastatin (LIPITOR) 40 MG tablet 1 po daily (Patient taking differently: Take 20 mg by mouth daily ) 90 tablet 3     Allergies   Allergen Reactions    Sulfa Antibiotics        REVIEW OF SYSTEMS  10 systems reviewed, pertinent positives and negatives per HPI, otherwise noted to be negative. PHYSICAL EXAM  ED Triage Vitals [03/01/22 1234]   BP Temp Temp Source Pulse Resp SpO2 Height Weight   (!) 145/67 98.9 °F (37.2 °C) Temporal 75 16 96 % -- --     General appearance: Awake and alert. Cooperative. Anxious appearing. HENT: Normocephalic. Atraumatic. Mucous membranes are moist.  Neck: Supple. Eyes: PERRL. EOMI. Heart/Chest: RRR. No murmurs. Lungs: Respirations unlabored. CTAB. Good air exchange. Speaking comfortably in full sentences. Abdomen: Soft. Non-tender. Non-distended. No rebound or guarding. Musculoskeletal: No extremity edema. No deformity. No tenderness in the extremities. All extremities neurovascularly intact. Skin: Warm and dry. No acute rashes. Neurological: Alert and oriented. CN II-XII intact. Gait normal.  Psychiatric: Mood/affect: anxious      LABS  I have reviewed all labs for this visit.    Results for orders placed or performed during the hospital encounter of 03/01/22   CBC with Auto Differential   Result Value Ref Range    WBC 7.2 4.0 - 11.0 K/uL    RBC 4.58 4.00 - 5.20 M/uL    Hemoglobin 13.6 12.0 - 16.0 g/dL    Hematocrit 40.4 36.0 - 48.0 %    MCV 88.2 80.0 - 100.0 fL    MCH 29.8 26.0 - 34.0 pg    MCHC 33.7 31.0 - 36.0 g/dL    RDW 14.5 12.4 - 15.4 %    Platelets 803 831 - 015 K/uL    MPV 9.9 5.0 - 10.5 fL    Neutrophils % 69.3 %    Lymphocytes % 21.3 %    Monocytes % 7.3 %    Eosinophils % 1.2 % Basophils % 0.9 %    Neutrophils Absolute 5.0 1.7 - 7.7 K/uL    Lymphocytes Absolute 1.5 1.0 - 5.1 K/uL    Monocytes Absolute 0.5 0.0 - 1.3 K/uL    Eosinophils Absolute 0.1 0.0 - 0.6 K/uL    Basophils Absolute 0.1 0.0 - 0.2 K/uL   Comprehensive Metabolic Panel w/ Reflex to MG   Result Value Ref Range    Sodium 136 136 - 145 mmol/L    Potassium reflex Magnesium 2.8 (LL) 3.5 - 5.1 mmol/L    Chloride 95 (L) 99 - 110 mmol/L    CO2 27 21 - 32 mmol/L    Anion Gap 14 3 - 16    Glucose 119 (H) 70 - 99 mg/dL    BUN 21 (H) 7 - 20 mg/dL    CREATININE 0.7 0.6 - 1.2 mg/dL    GFR Non-African American >60 >60    GFR African American >60 >60    Calcium 9.7 8.3 - 10.6 mg/dL    Total Protein 7.3 6.4 - 8.2 g/dL    Albumin 4.7 3.4 - 5.0 g/dL    Albumin/Globulin Ratio 1.8 1.1 - 2.2    Total Bilirubin 0.4 0.0 - 1.0 mg/dL    Alkaline Phosphatase 47 40 - 129 U/L    ALT 21 10 - 40 U/L    AST 21 15 - 37 U/L   Troponin   Result Value Ref Range    Troponin <0.01 <0.01 ng/mL   TSH with Reflex   Result Value Ref Range    TSH 1.91 0.27 - 4.20 uIU/mL   Magnesium   Result Value Ref Range    Magnesium 1.70 (L) 1.80 - 2.40 mg/dL   EKG 12 Lead   Result Value Ref Range    Ventricular Rate 85 BPM    Atrial Rate 85 BPM    P-R Interval 172 ms    QRS Duration 86 ms    Q-T Interval 380 ms    QTc Calculation (Bazett) 452 ms    P Axis 75 degrees    R Axis -7 degrees    T Axis -20 degrees    Diagnosis       Normal sinus rhythmPossible Left atrial enlargementST & T wave abnormality, consider anterior ischemiaAbnormal ECGWhen compared with ECG of 26-SEP-2013 06:51,T wave inversion now evident in Anterior leadsConfirmed by Sabrina, [address] [place] [address] ([id number]) on 3/1/2022 5:42:27 PM       RADIOLOGY  I have reviewed all radiographic studies for this visit. XR CHEST (2 VW)   Final Result   No acute process. ECG  EKG interpreted by myself.    Rate: normal  Rhythm: NSR  Axis: normal  Intervals: within normal limits  ST Segments: Nonspecific ST/T wave abnormality of the inferior and anterolateral leads, stable from prior  Comparison: Compared to 2/28/22, there is no significant change  Impression: NSR with nonspecific ST/T wave abnormality of the inferior and anterolateral leads       ED COURSE/MDM  Patient seen and evaluated. Old records reviewed. Labs and imaging reviewed and results discussed with patient/family to extent possible. This is a 20-year-old female presents with complaint of anxiety and chest pain. Patient follows closely with her PCP for this complaint and intends to follow with psychology. On arrival the patient is hypertensive with otherwise reassuring vital signs. Appears anxious but otherwise well. Cardiac exam no murmur. Lungs clear. Abdomen benign. Neurological exam nonfocal.  EKG shows nonspecific ST/T wave abnormalities of the inferior and anterolateral leads, similar to previous. Patient was treated with 1 mg of Ativan orally. Chest x-ray shows no acute cardiopulmonary process . CBC no significant anemia or leukocytosis. Renal panel reveals hypokalemia and hypomagnesemia. Will replete. Troponin within normal limits. TSH within normal limits. With respect to the patient's complaint of chest pain:   The patient's EKG reveals no acute ischemic abnormalities. Troponin is within normal limits. The presentation is not consistent with pulmonary embolism based on no pleuritic chest pain, tachycardia, tachypnea, hypoxia, or s/s of DVT and, as such, the risk of radiation exposure for CTPA is deemed greater than the potential diagnostic benefit. Not consistent with esophageal rupture as patient is nontoxic and no history of multiple episodes of forceful vomiting. Not consistent with pneumothorax as negative chest x-ray. Not consistent with aortic dissection as pain not tearing, not sudden in onset, not migratory, pulses are symmetric, and there are no neurological deficits.  As below, patient's HEART score calculates to low risk, and therefore supports early discharge with close PCP follow up. HEART SCORE:    History: +0 for low suspicion  EKG: +0 for normal EKG   Age: +1 for age 44-72 years  Risk factors (includes HLD, HTN, DM, tobacco use, obesity, and +FHx): +1 for 1-2 risk factors  Initial troponin: +0 for negative troponin    Heart score: 2. This falls under the following category: Score of 0-3, which indicates a very low risk for major adverse cardiac event and supports early discharge    Pt's anxiety resolved with administration of Ativan. Given reassuring evaluation and symptoms improved, will plan for d/c with close PCP f/u. Continue previously prescribed benzodiazepine regimen for anxiety. Follow-up with psychology as previously arranged. We will also advise patient to trial meditation/mindfulness apps or smart phone such as calm or headspace. Short course of potassium supplementation  Patient understands usual strict return precautions for new or worsening symptoms. During the patient's ED course, the patient was given:  Medications   LORazepam (ATIVAN) tablet 1 mg (1 mg Oral Given 3/1/22 1330)   potassium chloride (KLOR-CON M) extended release tablet 40 mEq (40 mEq Oral Given 3/1/22 1447)        All questions were answered and the patient/family expressed understanding and agreement with the plan. PROCEDURES  None    CRITICAL CARE  N/A    CLINICAL IMPRESSION  1. Anxiety state    2. Chest pain, unspecified type    3. Hypokalemia        DISPOSITION   discharge     Puneet Nova MD    Note: This chart was created using voice recognition dictation software. Efforts were made by me to ensure accuracy, however some errors may be present due to limitations of this technology and occasionally words are not transcribed correctly.         Puneet Nova MD  03/02/22 [id number]

## 2022-03-02 NOTE — TELEPHONE ENCOUNTER
Do not use together - use one or other as directed for anxiety.   Really need to get her in to see psychiatry as well as psychology asap
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
## 2022-03-02 NOTE — TELEPHONE ENCOUNTER
I am willing to refill but won't be due until 3/14.  She can call at that time and let us know how this is working prior to refills

## 2022-03-02 NOTE — TELEPHONE ENCOUNTER
Patient now has 60 ativan and 60 xanax at home. Not appropriate to have both. Will let Dr Del Rosario Session decide which to continue but pt should bring to office to destroy whichever she is not going to be taking anymore (ativan).

## 2022-03-04 ENCOUNTER — OFFICE VISIT (OUTPATIENT)
Dept: FAMILY MEDICINE CLINIC | Age: 62
End: 2022-03-04
Payer: COMMERCIAL

## 2022-03-04 VITALS
DIASTOLIC BLOOD PRESSURE: 80 MMHG | WEIGHT: 207 LBS | BODY MASS INDEX: 36.68 KG/M2 | HEIGHT: 63 IN | SYSTOLIC BLOOD PRESSURE: 128 MMHG

## 2022-03-04 DIAGNOSIS — F41.9 ANXIETY: Primary | ICD-10-CM

## 2022-03-04 PROCEDURE — 99214 OFFICE O/P EST MOD 30 MIN: CPT | Performed by: NURSE PRACTITIONER

## 2022-03-04 RX ORDER — ALPRAZOLAM 2 MG/1
2 TABLET ORAL 3 TIMES DAILY PRN
Qty: 90 TABLET | Refills: 0 | Status: SHIPPED | OUTPATIENT
Start: 2022-03-10 | End: 2022-03-30

## 2022-03-04 ASSESSMENT — ENCOUNTER SYMPTOMS
DIARRHEA: 0
SHORTNESS OF BREATH: 0
EYE PAIN: 0
EYE DISCHARGE: 0
ABDOMINAL PAIN: 0
ABDOMINAL DISTENTION: 0
EYE REDNESS: 0
WHEEZING: 0
SINUS PAIN: 0
VOMITING: 0
COUGH: 0
SINUS PRESSURE: 0
NAUSEA: 0
SORE THROAT: 0

## 2022-03-04 NOTE — PATIENT INSTRUCTIONS
Increase to 2 mg twice a day (x4 of the 0.5 mg). Once you run out, a refill of 2 mg tablets has been sent to the pharmacy. You can take one 2-3 times a day based upon how you are feeling.

## 2022-03-04 NOTE — PROGRESS NOTES
Vivek Narayan (:  1960) is a 64 y.o. female,Established patient, here for evaluation of the following chief complaint(s): Anxiety (SEVERE ANXIETY NOT DOING ANYTING CANNOT STOP CRYING CANNOT FUNCTION ALL SHE CAN THINK ABOUT IS ANXIETY AND IF SHE CAN FUNCTION OR NOT THERAPY STARTS TUESDAY BUT CANNOT WAIT NEEDS SOMETHING TO HAPPEN NOW NO THOUGHTS OF SELF HARM JUST WANTS HELP )      ASSESSMENT/PLAN:  1. Anxiety  -Anxiety continues to be unresolved despite dose changes. Will increase alprazolam to 2 mg. Patient will take twice daily until out of her current 0.5 mg tablets. Counted in office and #48 were present. A refill of 2 mg tablets was ordered in future dated for 6 days from now when the patient will be out of the medication. If needed, she can increase further to 3 times daily at that time if anxiety is still present. [place]ARRS reviewed.  -No changes to sertraline pending GeneSight results. I believe that the patient will need to change medications, but would like to hold off until this result has became present to avoid multiple changes to medication. We have not yet received results on this test.  -Discussed returning to ER if any symptoms were to worsen or she develops any thoughts of suicidal ideation or self-harm. Hopeful that the patient can avoid an inpatient psych situation, but if she continues to have unresolved issues this may need to be a consideration.  -     ALPRAZolam (XANAX) 2 MG tablet; Take 1 tablet by mouth 3 times daily as needed for Sleep for up to 30 days. , Disp-90 tablet, R-0Normal      Return Once genesight results. SUBJECTIVE/OBJECTIVE:  QUIN  Eddie Armstrong presents today for emergency room follow-up and continued uncontrolled anxiety. She was seen by me on . She was changed from Ativan to alprazolam and GeneSight testing was ordered. She went to the emergency room on 3/1 with uncontrolled worsening anxiety.   She was given additional 1 mg of Ativan in the emergency room, received a cardiac work-up which was unremarkable, and was discharged home with instruction to increase the alprazolam to 1 mg twice daily as had been recommended by me for any unresolved symptoms. She states that she has done this and continues to have continuous anxiety. She states that she wakes up anxious and is unable to calm herself down. She did take the Xanax this morning and states that she is continuing to be tearful. She is tearful in office today. Denies any suicidal ideation or self-harm. She also increase the buspirone to 10 mg and did not feel that this was helping either. GeneSight results are still pending. Review of Systems   Constitutional: Negative for chills and fever. HENT: Negative for ear discharge, ear pain, hearing loss, sinus pressure, sinus pain and sore throat. Eyes: Negative for pain, discharge and redness. Respiratory: Negative for cough, shortness of breath and wheezing. Cardiovascular: Positive for chest pain and palpitations. Gastrointestinal: Negative for abdominal distention, abdominal pain, diarrhea, nausea and vomiting. Genitourinary: Negative for dysuria and hematuria. Musculoskeletal: Negative for myalgias. Skin: Negative for rash. Neurological: Negative for dizziness, weakness, light-headedness, numbness and headaches. Psychiatric/Behavioral: Positive for decreased concentration, dysphoric mood and sleep disturbance. Negative for self-injury and suicidal ideas. The patient is nervous/anxious. Physical Exam  Constitutional:       Appearance: Normal appearance. She is obese. Comments: Tearful   Cardiovascular:      Pulses: Normal pulses. Heart sounds: Normal heart sounds. No murmur heard. No gallop. Pulmonary:      Effort: Pulmonary effort is normal.      Breath sounds: Normal breath sounds. No wheezing. Neurological:      Mental Status: She is alert and oriented to person, place, and time.    Psychiatric:         Mood and Affect: Mood normal.         Behavior: Behavior normal.         Thought Content: Thought content normal.         Judgment: Judgment normal.               This dictation was generated by voice recognition computer software. Although all attempts are made to edit the dictation for accuracy, there may be errors in the transcription that are not intended. An electronic signature was used to authenticate this note.     --PHONG Burks - CNP

## 2022-03-07 ENCOUNTER — OFFICE VISIT (OUTPATIENT)
Dept: FAMILY MEDICINE CLINIC | Age: 62
End: 2022-03-07
Payer: COMMERCIAL

## 2022-03-07 DIAGNOSIS — F41.1 GENERALIZED ANXIETY DISORDER: Primary | ICD-10-CM

## 2022-03-07 PROCEDURE — 99214 OFFICE O/P EST MOD 30 MIN: CPT | Performed by: NURSE PRACTITIONER

## 2022-03-07 RX ORDER — DESVENLAFAXINE 50 MG/1
50 TABLET, EXTENDED RELEASE ORAL DAILY
Qty: 30 TABLET | Refills: 3 | Status: SHIPPED | OUTPATIENT
Start: 2022-03-07 | End: 2022-03-30

## 2022-03-07 RX ORDER — METOPROLOL SUCCINATE 25 MG/1
TABLET, EXTENDED RELEASE ORAL
Qty: 90 TABLET | Refills: 2 | Status: SHIPPED | OUTPATIENT
Start: 2022-03-07 | End: 2022-08-19

## 2022-03-07 RX ORDER — DESVENLAFAXINE 25 MG/1
25 TABLET, EXTENDED RELEASE ORAL DAILY
Qty: 7 TABLET | Refills: 0 | Status: SHIPPED | OUTPATIENT
Start: 2022-03-07 | End: 2022-03-30

## 2022-03-07 ASSESSMENT — ENCOUNTER SYMPTOMS
NAUSEA: 0
EYE REDNESS: 0
WHEEZING: 0
SHORTNESS OF BREATH: 0
VOMITING: 0
ABDOMINAL PAIN: 0
SINUS PAIN: 0
DIARRHEA: 0
EYE PAIN: 0
ABDOMINAL DISTENTION: 0
EYE DISCHARGE: 0
COUGH: 0
SORE THROAT: 0
SINUS PRESSURE: 0

## 2022-03-07 NOTE — PROGRESS NOTES
Aram Kerr (:  1960) is a 64 y.o. female,Established patient, here for evaluation of the following chief complaint(s): Anxiety (Continues to have anxiety despite medication changes. Would like to discuss Genesight results if available.)      ASSESSMENT/PLAN:  1. Anxiety  -Continues to be uncontrolled despite Zoloft, BuSpar, and Xanax. Reviewed GeneSight results with the patient. Discontinue Zoloft and BuSpar and start Pristiq. Educated on the medication use as well as side effects. She will take Zoloft and BuSpar daily instead twice daily for 1 week, then discontinue. She will simultaneously start Pristiq at 25 mg for a week and then increase to 50 mg. Continue Xanax as needed. Encouraged the patient to use this if she has symptoms given that she has only been taking once daily at this time. She has a follow-up scheduled with Dr. Fatimah Sharma at the end of the month. -     desvenlafaxine succinate (PRISTIQ) 25 MG TB24 extended release tablet; Take 1 tablet by mouth daily, Disp-7 tablet, R-0Normal  -     desvenlafaxine succinate (PRISTIQ) 50 MG TB24 extended release tablet; Take 1 tablet by mouth daily, Disp-30 tablet, R-3Normal      Return in about 23 days (around 3/30/2022) for Follow-up anxiety. SUBJECTIVE/OBJECTIVE:  QUIN Lozano presents today regarding continued anxiety. Her last appointment, she was increased to 2 mg alprazolam 3 times daily. She has been taking this along with 200 mg Zoloft and 10 mg buspirone. She states that this regimen is not working well for her whatsoever. She states that she continues to wake up with anxiety. She states that when she wakes up with anxiety she takes 2 mg Xanax. She will then take an additional 1 mg in the middle of the day if she continues to have symptoms. She states that once she gets going and is out for the day her anxiety improves. Mornings are the worst part for her. Her GeneSight results did come back since being last seen.   She would like to go over these and change her regimen. Denies any suicidal ideation or thoughts of self-harm. Review of Systems   Constitutional: Negative for chills and fever. HENT: Negative for ear discharge, ear pain, hearing loss, sinus pressure, sinus pain and sore throat. Eyes: Negative for pain, discharge and redness. Respiratory: Negative for cough, shortness of breath and wheezing. Cardiovascular: Negative for chest pain and palpitations. Gastrointestinal: Negative for abdominal distention, abdominal pain, diarrhea, nausea and vomiting. Genitourinary: Negative for dysuria and hematuria. Musculoskeletal: Negative for myalgias. Skin: Negative for rash. Neurological: Negative for dizziness, weakness, light-headedness, numbness and headaches. Psychiatric/Behavioral: Negative for decreased concentration, dysphoric mood, self-injury, sleep disturbance and suicidal ideas. The patient is nervous/anxious. Physical Exam  Constitutional:       General: She is not in acute distress. Appearance: Normal appearance. She is obese. HENT:      Head: Normocephalic and atraumatic. Right Ear: Tympanic membrane, ear canal and external ear normal.      Left Ear: Tympanic membrane, ear canal and external ear normal.      Mouth/Throat:      Mouth: Mucous membranes are moist.   Eyes:      Extraocular Movements: Extraocular movements intact. Conjunctiva/sclera: Conjunctivae normal.      Pupils: Pupils are equal, round, and reactive to light. Neck:      Thyroid: No thyromegaly. Cardiovascular:      Rate and Rhythm: Normal rate and regular rhythm. Pulses: Normal pulses. Heart sounds: Normal heart sounds. No murmur heard. No gallop. Pulmonary:      Effort: Pulmonary effort is normal.      Breath sounds: Normal breath sounds. Abdominal:      General: Bowel sounds are normal.      Palpations: Abdomen is soft. Tenderness: There is no abdominal tenderness.  There is no guarding or rebound. Musculoskeletal:         General: Normal range of motion. Cervical back: Normal range of motion and neck supple. Lymphadenopathy:      Cervical: No cervical adenopathy. Skin:     General: Skin is warm and dry. Capillary Refill: Capillary refill takes less than 2 seconds. Neurological:      Mental Status: She is alert and oriented to person, place, and time. Psychiatric:         Mood and Affect: Mood normal.         Behavior: Behavior normal.         Thought Content: Thought content normal.         Judgment: Judgment normal.               This dictation was generated by voice recognition computer software. Although all attempts are made to edit the dictation for accuracy, there may be errors in the transcription that are not intended. An electronic signature was used to authenticate this note.     --PHONG Koch - CNP

## 2022-03-07 NOTE — PATIENT INSTRUCTIONS
Start Prestiq 25 mg in the evening. Stop the nighttime doses of the zoloft and buspar. After one week, increase to 50 mg Prestiq and stop the zoloft and buspar. We can follow-up at your next appointment at the end of the month. Patient Education        desvenlafaxine  Pronunciation:  jayson ADILIA la FAX een  Brand:  [employer] Milks  What is the most important information I should know about desvenlafaxine? Some young people have thoughts about suicide when first taking an antidepressant. Stay alert to changes in your mood or symptoms. Report any new or worsening symptoms to your doctor. Do not stop using desvenlafaxine without first talking to your doctor. What is desvenlafaxine? Desvenlafaxine is a serotonin and norepinephrine reuptake inhibitor (SNRI) antidepressant. Desvenlafaxine is used to treat major depressive disorder. Desvenlafaxine may also be used for purposes not listed in this medication guide. What should I discuss with my healthcare provider before taking desvenlafaxine? You should not use this medicine if you are allergic to desvenlafaxine or venlafaxine (Effexor). Do not use desvenlafaxine within 7 days before or 14 days after you have used an MAO inhibitor, such as isocarboxazid, linezolid, methylene blue injection, phenelzine, or tranylcypromine. A dangerous drug interaction could occur. Tell your doctor if you also take stimulant medicine, opioid medicine, herbal products, or medicine for depression, mental illness, Parkinson's disease, migraine headaches, serious infections, or prevention of nausea and vomiting. An interaction with desvenlafaxine could cause a serious condition called serotonin syndrome.   Tell your doctor if you have ever had:  · heart disease, high blood pressure, high cholesterol, or a stroke;  · bipolar disorder (manic depression);  · depression, suicidal thoughts;  · liver or kidney disease;  · glaucoma;  · seizures or epilepsy;  · lung or breathing problems;  · a bleeding or blood clotting disorder; or  · low levels of sodium in your blood. Some young people have thoughts about suicide when first taking an antidepressant. Your doctor should check your progress at regular visits. Your family or other caregivers should also be alert to changes in your mood or symptoms. Taking an SNRI antidepressant during late pregnancy could increase your risk of excessive bleeding after you give birth, and may cause serious medical complications in the baby. However, stopping the medicine may not be safe if you have a relapse of depression. Do not start or stop desvenlafaxine without asking your doctor. If you are pregnant, your name may be listed on a pregnancy registry to track the effects of desvenlafaxine on the baby. Ask a doctor if it is safe to breastfeed while using this medicine. Not approved for use by anyone younger than 25years old. How should I take desvenlafaxine? Follow all directions on your prescription label and read all medication guides or instruction sheets. Use the medicine exactly as directed. Take desvenlafaxine with water at the same time each day, with or without food. Swallow the tablet whole and do not crush, chew, or break it. Your blood pressure will need to be checked often. Your symptoms may not improve for several weeks. You may have unpleasant symptoms if you stop using desvenlafaxine suddenly. Ask your doctor before stopping the medicine. Part of a tablet shell may appear in your stool but this will not make the medicine less effective. Store at room temperature away from moisture and heat. What happens if I miss a dose? Take the medicine as soon as you can, but skip the missed dose if it is almost time for your next dose. Do not take two doses at one time. What happens if I overdose? Seek emergency medical attention or call the Poison Help line at 1-294.872.6445. What should I avoid while taking desvenlafaxine?   Avoid driving or hazardous activity until you know how this medicine will affect you. Your reactions could be impaired. Ask your doctor before taking a nonsteroidal anti-inflammatory drug (NSAID) such as aspirin, ibuprofen, naproxen, Advil, Aleve, Motrin, and others. Using an NSAID with desvenlafaxine may cause you to bruise or bleed easily. Avoid drinking alcohol. What are the possible side effects of desvenlafaxine? Get emergency medical help if you have signs of an allergic reaction: skin rash or hives; difficulty breathing; swelling of your face, lips, tongue, or throat. Report any new or worsening symptoms to your doctor, such as: mood or behavior changes, anxiety, panic attacks, trouble sleeping, or if you feel impulsive, irritable, agitated, hostile, aggressive, restless, hyperactive (mentally or physically), more depressed, or have thoughts about suicide or hurting yourself. Call your doctor at once if you have:  · a seizure (convulsions);  · easy bruising or bleeding (nosebleeds, bleeding gums), blood in your urine or stools, coughing up blood;  · blurred vision, eye pain or swelling, or seeing halos around lights;  · cough, chest discomfort, trouble breathing; or  · low blood sodium --headache, confusion, problems with thinking or memory, weakness, feeling unsteady. Seek medical attention right away if you have symptoms of serotonin syndrome, such as: agitation, hallucinations, fever, sweating, shivering, fast heart rate, muscle stiffness, twitching, loss of coordination, nausea, vomiting, or diarrhea. Common side effects may include:  · dizziness, drowsiness, anxiety;  · increased sweating;  · nausea, decreased appetite, constipation;  · sleep problems (insomnia); or  · decreased sex drive, impotence, or difficulty having an orgasm. This is not a complete list of side effects and others may occur. Call your doctor for medical advice about side effects.  You may report side effects to FDA at 1-800-FDA-1088. What other drugs will affect desvenlafaxine? Using desvenlafaxine with other drugs that make you drowsy can worsen this effect. Ask your doctor before using opioid medication, a sleeping pill, a muscle relaxer, or medicine for anxiety or seizures. Tell your doctor about all your other medicines, especially a blood thinner (warfarin, Coumadin, Amanda Orlando) or other medicine used to prevents blood clots. Other drugs may affect desvenlafaxine, including prescription and over-the-counter medicines, vitamins, and herbal products. Tell your doctor about all other medicines you use. Where can I get more information? Your pharmacist can provide more information about desvenlafaxine. Remember, keep this and all other medicines out of the reach of children, never share your medicines with others, and use this medication only for the indication prescribed. Every effort has been made to ensure that the information provided by [address] is accurate, up-to-date, and complete, but no guarantee is made to that effect. Drug information contained herein may be time sensitive. [employer] information has been compiled for use by healthcare practitioners and consumers in the [place] and therefore [employer] does not warrant that uses outside of the [place] are appropriate, unless specifically indicated otherwise. [facility]'s drug information does not endorse drugs, diagnose patients or recommend therapy. [employer] drug information is an informational resource designed to assist licensed healthcare practitioners in caring for their patients and/or to serve consumers viewing this service as a supplement to, and not a substitute for, the expertise, skill, knowledge and judgment of healthcare practitioners. The absence of a warning for a given drug or drug combination in no way should be construed to indicate that the drug or drug combination is safe, effective or appropriate for any given patient. [facility] does not assume any responsibility for any aspect of healthcare administered with the aid of information [facility] provides. The information contained herein is not intended to cover all possible uses, directions, precautions, warnings, drug interactions, allergic reactions, or adverse effects. If you have questions about the drugs you are taking, check with your doctor, nurse or pharmacist.  Copyright 0285-3218 [address]: 12.02. Revision date: 6/17/2021. Care instructions adapted under license by [facility] [place] ([facility]). If you have questions about a medical condition or this instruction, always ask your healthcare professional. [place] [address] any warranty or liability for your use of this information.

## 2022-03-07 NOTE — TELEPHONE ENCOUNTER
LAST VISIT 03/04/2022 WITH HETAL SINGH CNP, NEXT VISIT TODAY WITH HETAL SINGH CNP.  [facility].     3/1/2022  2:33 PM - Sveta Lyman Incoming Lab Results From Soft (Epic Adt)    Component Value Flag Ref Range Units Status   Sodium 136   136 - 145 mmol/L Final   Potassium reflex Magnesium 2.8   Low Panic   3.5 - 5.1 mmol/L Final   Chloride 95   Low   99 - 110 mmol/L Final   CO2 27   21 - 32 mmol/L Final   Anion Gap 14   3 - 16  Final   Glucose 119   High   70 - 99 mg/dL Final   BUN 21   High   7 - 20 mg/dL Final   CREATININE 0.7   0.6 - 1.2 mg/dL Final   GFR Non- >60   >60  Final   Comment:   >60 mL/min/1.73m2 EGFR, calc. for ages 25 and older using the   MDRD formula (not corrected for weight), is valid for stable   renal function. GFR  >60   >60  Final   Comment:   Chronic Kidney Disease: less than 60 ml/min/1.73 sq. m.         Kidney Failure: less than 15 ml/min/1.73 sq.m. Results valid for patients 18 years and older.     Calcium 9.7   8.3 - 10.6 mg/dL Final   Total Protein 7.3   6.4 - 8.2 g/dL Final   Albumin 4.7   3.4 - 5.0 g/dL Final   Albumin/Globulin Ratio 1.8   1.1 - 2.2  Final   Total Bilirubin 0.4   0.0 - 1.0 mg/dL Final   Alkaline Phosphatase 47   40 - 129 U/L Final   ALT 21   10 - 40 U/L Final   AST 21   15 - 37 U/L Final       Narrative    Dino Ly [id number],   Chemistry results called to and read back by Philly Fernandez RN,   03/01/2022 14:32, by [facility]   Performed at:   [facility]   [address] [address]   Phone [phone]            3/1/2022  2:13 PM - Sveta Lyman Incoming Lab Results From Soft (Epic Adt)    Component Value Flag Ref Range Units Status   Sodium 136   136 - 145 mmol/L Final   Chloride 95   Low   99 - 110 mmol/L Final   CO2 27   21 - 32 mmol/L Final   Anion Gap 14   3 - 16  Final   Glucose 119   High   70 - 99 mg/dL Final   BUN 21   High   7 - 20 mg/dL Final   CREATININE 0.7   0.6 - 1.2 mg/dL Final GFR Non-African American >60   >60  Final   Comment:   >60 mL/min/1.73m2 EGFR, calc. for ages 25 and older using the   MDRD formula (not corrected for weight), is valid for stable   renal function. GFR  >60   >60  Final   Comment:   Chronic Kidney Disease: less than 60 ml/min/1.73 sq. m.         Kidney Failure: less than 15 ml/min/1.73 sq.m. Results valid for patients 18 years and older.     Calcium 9.7   8.3 - 10.6 mg/dL Final   Total Protein 7.3   6.4 - 8.2 g/dL Final   Albumin 4.7   3.4 - 5.0 g/dL Final   Albumin/Globulin Ratio 1.8   1.1 - 2.2  Final   Total Bilirubin 0.4   0.0 - 1.0 mg/dL Final   Alkaline Phosphatase 47   40 - 129 U/L Final   ALT 21   10 - 40 U/L Final   AST 21   15 - 37 U/L Final

## 2022-03-10 DIAGNOSIS — F41.1 GENERALIZED ANXIETY DISORDER: ICD-10-CM

## 2022-03-10 RX ORDER — DESVENLAFAXINE 25 MG/1
TABLET, EXTENDED RELEASE ORAL
Qty: 7 TABLET | Refills: 0 | OUTPATIENT
Start: 2022-03-10

## 2022-03-30 ENCOUNTER — OFFICE VISIT (OUTPATIENT)
Dept: FAMILY MEDICINE CLINIC | Age: 62
End: 2022-03-30
Payer: COMMERCIAL

## 2022-03-30 VITALS
SYSTOLIC BLOOD PRESSURE: 122 MMHG | BODY MASS INDEX: 34.91 KG/M2 | OXYGEN SATURATION: 99 % | HEART RATE: 92 BPM | DIASTOLIC BLOOD PRESSURE: 76 MMHG | HEIGHT: 63 IN | WEIGHT: 197 LBS

## 2022-03-30 DIAGNOSIS — F41.9 ANXIETY: ICD-10-CM

## 2022-03-30 DIAGNOSIS — G89.29 CHRONIC PAIN OF RIGHT KNEE: ICD-10-CM

## 2022-03-30 DIAGNOSIS — M79.604 PAIN IN BOTH LOWER EXTREMITIES: ICD-10-CM

## 2022-03-30 DIAGNOSIS — M79.605 PAIN IN BOTH LOWER EXTREMITIES: ICD-10-CM

## 2022-03-30 DIAGNOSIS — F32.4 MAJOR DEPRESSIVE DISORDER IN PARTIAL REMISSION, UNSPECIFIED WHETHER RECURRENT (HCC): Primary | ICD-10-CM

## 2022-03-30 DIAGNOSIS — M25.561 CHRONIC PAIN OF RIGHT KNEE: ICD-10-CM

## 2022-03-30 DIAGNOSIS — F41.1 GENERALIZED ANXIETY DISORDER: ICD-10-CM

## 2022-03-30 DIAGNOSIS — I10 HYPERTENSION, UNSPECIFIED TYPE: ICD-10-CM

## 2022-03-30 PROCEDURE — 99214 OFFICE O/P EST MOD 30 MIN: CPT | Performed by: FAMILY MEDICINE

## 2022-03-30 RX ORDER — DESVENLAFAXINE 100 MG/1
100 TABLET, EXTENDED RELEASE ORAL NIGHTLY
Qty: 30 TABLET | Refills: 3 | Status: SHIPPED | OUTPATIENT
Start: 2022-03-30 | End: 2022-05-20 | Stop reason: SDUPTHER

## 2022-03-30 RX ORDER — BUPROPION HYDROCHLORIDE 150 MG/1
150 TABLET ORAL EVERY MORNING
Qty: 30 TABLET | Refills: 3 | Status: SHIPPED | OUTPATIENT
Start: 2022-03-30 | End: 2022-04-18 | Stop reason: SDUPTHER

## 2022-03-30 RX ORDER — HYDROCODONE BITARTRATE AND ACETAMINOPHEN 7.5; 325 MG/1; MG/1
.5-1 TABLET ORAL EVERY 8 HOURS PRN
Qty: 50 TABLET | Refills: 0 | Status: SHIPPED | OUTPATIENT
Start: 2022-03-30 | End: 2022-04-29 | Stop reason: SDUPTHER

## 2022-03-30 RX ORDER — ALPRAZOLAM 1 MG/1
1 TABLET ORAL 3 TIMES DAILY PRN
Qty: 60 TABLET | Refills: 0 | Status: SHIPPED | OUTPATIENT
Start: 2022-03-30 | End: 2022-04-29 | Stop reason: SDUPTHER

## 2022-03-30 RX ORDER — LOSARTAN POTASSIUM AND HYDROCHLOROTHIAZIDE 25; 100 MG/1; MG/1
TABLET ORAL
Qty: 90 TABLET | Refills: 3 | Status: SHIPPED | OUTPATIENT
Start: 2022-03-30 | End: 2022-04-18

## 2022-03-30 NOTE — PROGRESS NOTES
[facility]  Clinic Note    Date: 3/30/2022                                               Subjective:     Chief Complaint   Patient presents with    Anxiety     ANXIETY FOLLOW UP SEEMS TO BE SOME BETTER, XANAX MAKES HER FEEL GROGGY SHE IS NOW TAKING HALF OF ONE AND HALF IN THE AFTERNOON AND IT SEEMS TO BE BETTER WENT TO [facility] AND SHE WAS VERY RUDE LEFT WORSE      HPI  In ER and seen in office - severe anxiety - some better  Not sure depression med is helping  Limiting hours at work to am  At times doesn't want to get out of bed - goes back to bed after work  Given xanax -initially whole pill but groggy/ hard to walk  Cut in 1/2 - trying to stretch out - down to 1/2 tab bid and working fairly well - still having anxiety - not as bad as can't breathe but still bursts out crying  Saw counselor in [facility] - finding another counselor  Seeing someone at noon today-name is Dr. Chaparro Child on [address] from [facility] - psychologist - 12 today.   On pristiq 50/d  Needs pain med refill as still working   Filled on 2/22 - trying to cut back on pain med  Lost weight on diet - appetite down - doing weight watchers  Eating salads/ chicken alot  BP Readings from Last 3 Encounters:   03/30/22 122/76   03/04/22 128/80   03/01/22 (!) 145/67     Pulse Readings from Last 3 Encounters:   03/30/22 92   03/01/22 75   02/28/22 67     Wt Readings from Last 3 Encounters:   03/30/22 197 lb (89.4 kg)   03/04/22 207 lb (93.9 kg)   02/28/22 207 lb (93.9 kg)            Patient Active Problem List    Diagnosis Date Noted    Generalized anxiety disorder 03/07/2022    Prediabetes 11/23/2021    Vitamin D insufficiency 10/31/2017    CTS (carpal tunnel syndrome) 10/02/2013    Lumbosacral strain     Urinary frequency     Phlebitis and thrombophlebitis of superficial vessels of lower extremities     Leg pain     Peritonsillar abscess     Insomnia     Palpitations     Other specified abnormal findings of blood chemistry     Other and unspecified ovarian cyst     Depression     Plantar fasciitis     Encounter for long-term (current) use of other medications     Pure hypercholesterolemia      Past Medical History:   Diagnosis Date    ADD (attention deficit disorder)     Cervical dysplasia     Chronic leg pain     Depression     Diabetes mellitus, type 2 9/16/2013    Fibrocystic breast     Genital herpes 1994    Hemorrhoid     Hyperlipidemia     Metabolic syndrome     Mood disorder ([facility] [address]     MRSA cellulitis 2007    facial    Obesity     Panic attacks     Plantar fasciitis     Recurrent UTI     Urethral stenosis     Urinary incontinence, mixed      Past Surgical History:   Procedure Laterality Date    CARPAL TUNNEL RELEASE Right 9/26/13    CHOLECYSTECTOMY      HYSTERECTOMY, TOTAL ABDOMINAL  10/2006    LABIAL ADHESION LYSIS  10/2006    OTHER SURGICAL HISTORY      No problems with anesthesia, no problems with healing, no problems with blood clots after surgery.  OTHER SURGICAL HISTORY  5/95    lipoma removal under arm    PLANTAR FASCIA SURGERY  October 2009    MRSA after surgery.      Admission on 03/01/2022, Discharged on 03/01/2022   Component Date Value Ref Range Status    Ventricular Rate 03/01/2022 85  BPM Final    Atrial Rate 03/01/2022 85  BPM Final    P-R Interval 03/01/2022 172  ms Final    QRS Duration 03/01/2022 86  ms Final    Q-T Interval 03/01/2022 380  ms Final    QTc Calculation (Bazett) 03/01/2022 452  ms Final    P Axis 03/01/2022 75  degrees Final    R Axis 03/01/2022 -7  degrees Final    T Axis 03/01/2022 -20  degrees Final    Diagnosis 03/01/2022 Normal sinus rhythmPossible Left atrial enlargementST & T wave abnormality, consider anterior ischemiaAbnormal ECGWhen compared with ECG of 26-SEP-2013 06:51,T wave inversion now evident in Anterior leadsConfirmed by Sabrina, [address] [place] [address] ([id number]) on 3/1/2022 5:42:27 PM   Final    WBC 03/01/2022 7.2  4.0 - 11.0 K/uL Final    RBC 03/01/2022 4.58  4.00 - 5.20 M/uL Final    Hemoglobin 03/01/2022 13.6  12.0 - 16.0 g/dL Final    Hematocrit 03/01/2022 40.4  36.0 - 48.0 % Final    MCV 03/01/2022 88.2  80.0 - 100.0 fL Final    MCH 03/01/2022 29.8  26.0 - 34.0 pg Final    MCHC 03/01/2022 33.7  31.0 - 36.0 g/dL Final    RDW 03/01/2022 14.5  12.4 - 15.4 % Final    Platelets 71/31/6461 256  135 - 450 K/uL Final    MPV 03/01/2022 9.9  5.0 - 10.5 fL Final    Neutrophils % 03/01/2022 69.3  % Final    Lymphocytes % 03/01/2022 21.3  % Final    Monocytes % 03/01/2022 7.3  % Final    Eosinophils % 03/01/2022 1.2  % Final    Basophils % 03/01/2022 0.9  % Final    Neutrophils Absolute 03/01/2022 5.0  1.7 - 7.7 K/uL Final    Lymphocytes Absolute 03/01/2022 1.5  1.0 - 5.1 K/uL Final    Monocytes Absolute 03/01/2022 0.5  0.0 - 1.3 K/uL Final    Eosinophils Absolute 03/01/2022 0.1  0.0 - 0.6 K/uL Final    Basophils Absolute 03/01/2022 0.1  0.0 - 0.2 K/uL Final    Sodium 03/01/2022 136  136 - 145 mmol/L Final    Potassium reflex Magnesium 03/01/2022 2.8* 3.5 - 5.1 mmol/L Final    Chloride 03/01/2022 95* 99 - 110 mmol/L Final    CO2 03/01/2022 27  21 - 32 mmol/L Final    Anion Gap 03/01/2022 14  3 - 16 Final    Glucose 03/01/2022 119* 70 - 99 mg/dL Final    BUN 03/01/2022 21* 7 - 20 mg/dL Final    CREATININE 03/01/2022 0.7  0.6 - 1.2 mg/dL Final    GFR Non- 03/01/2022 >60  >60 Final    Comment: >60 mL/min/1.73m2 EGFR, calc. for ages 25 and older using the  MDRD formula (not corrected for weight), is valid for stable  renal function.  GFR  03/01/2022 >60  >60 Final    Comment: Chronic Kidney Disease: less than 60 ml/min/1.73 sq.m. Kidney Failure: less than 15 ml/min/1.73 sq.m. Results valid for patients 18 years and older.       Calcium 03/01/2022 9.7  8.3 - 10.6 mg/dL Final    Total Protein 03/01/2022 7.3  6.4 - 8.2 g/dL Final    Albumin 03/01/2022 4.7  3.4 - 5.0 g/dL Final    Albumin/Globulin Ratio 03/01/2022 1.8  1.1 - 2.2 Final    Total Bilirubin 03/01/2022 0.4  0.0 - 1.0 mg/dL Final    Alkaline Phosphatase 03/01/2022 47  40 - 129 U/L Final    ALT 03/01/2022 21  10 - 40 U/L Final    AST 03/01/2022 21  15 - 37 U/L Final    Troponin 03/01/2022 <0.01  <0.01 ng/mL Final    Methodology by Troponin T    TSH 03/01/2022 1.91  0.27 - 4.20 uIU/mL Final    Magnesium 03/01/2022 1.70* 1.80 - 2.40 mg/dL Final     Family History   Problem Relation Age of Onset    Diabetes Mother     Drug Abuse Mother     Psoriasis Mother     Asthma Mother     COPD Mother     Other Mother         shingles; cervical dysplasia    Heart Disease Father     Diabetes Father     Other Father         kidney stones    Asthma Maternal Aunt     Diabetes Maternal Aunt     Heart Disease Maternal Aunt     Other Maternal Aunt         hep B/cirrhosis    Diabetes Other     Other Sister         1958 gallstones    Diabetes Sister     Cancer Sister         1958 hyst d/t Ca    Scoliosis Son     Other Son         hx of Cat scratch disease?  Heart Disease Son         cardiomyopathy    Asthma Son      Current Outpatient Medications   Medication Sig Dispense Refill    metoprolol succinate (TOPROL XL) 25 MG extended release tablet TAKE 1 TABLET BY  MOUTH IN THE MORNING AND ONE TO TWO TABLETS IN THE EVENING AS DIRECTED 90 tablet 2    desvenlafaxine succinate (PRISTIQ) 25 MG TB24 extended release tablet Take 1 tablet by mouth daily 7 tablet 0    desvenlafaxine succinate (PRISTIQ) 50 MG TB24 extended release tablet Take 1 tablet by mouth daily 30 tablet 3    ALPRAZolam (XANAX) 2 MG tablet Take 1 tablet by mouth 3 times daily as needed for Sleep for up to 30 days.  90 tablet 0    amLODIPine (NORVASC) 5 MG tablet Take 1 tablet by mouth daily      losartan-hydroCHLOROthiazide (HYZAAR) 100-25 MG per tablet TAKE ONE TABLET BY MOUTH DAILY 30 tablet 0    estradiol (ESTRACE VAGINAL) 0.1 MG/GM vaginal cream Place 1 g vaginally daily For 2 weeks then twice weekly 1 each 11    atorvastatin (LIPITOR) 40 MG tablet 1 po daily (Patient taking differently: Take 20 mg by mouth daily ) 90 tablet 3    potassium chloride (KLOR-CON M) 20 MEQ extended release tablet Take 1 tablet by mouth daily for 5 days 5 tablet 0     No current facility-administered medications for this visit. Allergies   Allergen Reactions    Sulfa Antibiotics        Review of Systems    Objective:  /76 (Site: Left Upper Arm, Position: Sitting, Cuff Size: Medium Adult)   Pulse 92   Ht 5' 3\" (1.6 m)   Wt 197 lb (89.4 kg)   LMP  (Exact Date)   SpO2 99%   Breastfeeding No   BMI 34.90 kg/m²     BP Readings from Last 3 Encounters:   03/30/22 122/76   03/04/22 128/80   03/01/22 (!) 145/67       Pulse Readings from Last 3 Encounters:   03/30/22 92   03/01/22 75   02/28/22 67       Wt Readings from Last 3 Encounters:   03/30/22 197 lb (89.4 kg)   03/04/22 207 lb (93.9 kg)   02/28/22 207 lb (93.9 kg)       Physical Exam  Vitals and nursing note reviewed. Constitutional:       Appearance: She is well-developed. HENT:      Head: Normocephalic and atraumatic. Eyes:      General: No scleral icterus. Conjunctiva/sclera: Conjunctivae normal.   Pulmonary:      Effort: Pulmonary effort is normal.   Skin:     General: Skin is warm and dry. Neurological:      Mental Status: She is alert and oriented to person, place, and time. Assessment/Plan:      Diagnosis Orders   1. Major depressive disorder in partial remission, unspecified whether recurrent ([facility] [address]     2. Generalized anxiety disorder  desvenlafaxine succinate (PRISTIQ) 100 MG TB24 extended release tablet   3. Anxiety  ALPRAZolam (XANAX) 1 MG tablet   4. Hypertension, unspecified type  losartan-hydroCHLOROthiazide (HYZAAR) 100-25 MG per tablet   5. Pain in both lower extremities  HYDROcodone-acetaminophen (NORCO) 7.5-325 MG per tablet   6.  Chronic pain of right knee  HYDROcodone-acetaminophen (NORCO) 7.5-325 MG per tablet       Reduce norco 7.5 from 60 to 50 w/ goal to reduce each month   Xanax 1mg bid w/ goal to reduce dose as able  Increase pristiq to 100mg/d and add wellbutrin xl 150 in am  genesight reviewed - se dw/ pt  Hold norvasc  Cont losartan hctz 100/25 and toprol xl 25 - consider bump toprol if bp >140/90  Monitor in office 2 weeks  F/u 1 month  oarrs reviewed and results c/w rx'd meds    Buck Benavidez MD, MD  3/30/2022  8:35 AM

## 2022-04-13 ENCOUNTER — NURSE ONLY (OUTPATIENT)
Dept: FAMILY MEDICINE CLINIC | Age: 62
End: 2022-04-13

## 2022-04-13 VITALS — SYSTOLIC BLOOD PRESSURE: 130 MMHG | DIASTOLIC BLOOD PRESSURE: 80 MMHG

## 2022-04-13 DIAGNOSIS — I10 HYPERTENSION, UNSPECIFIED TYPE: Primary | ICD-10-CM

## 2022-04-13 PROCEDURE — 99999 PR OFFICE/OUTPT VISIT,PROCEDURE ONLY: CPT | Performed by: FAMILY MEDICINE

## 2022-04-18 ENCOUNTER — OFFICE VISIT (OUTPATIENT)
Dept: FAMILY MEDICINE CLINIC | Age: 62
End: 2022-04-18
Payer: COMMERCIAL

## 2022-04-18 VITALS
DIASTOLIC BLOOD PRESSURE: 56 MMHG | WEIGHT: 189 LBS | SYSTOLIC BLOOD PRESSURE: 90 MMHG | OXYGEN SATURATION: 99 % | HEIGHT: 63 IN | RESPIRATION RATE: 16 BRPM | BODY MASS INDEX: 33.49 KG/M2 | HEART RATE: 54 BPM

## 2022-04-18 DIAGNOSIS — F41.1 GENERALIZED ANXIETY DISORDER: ICD-10-CM

## 2022-04-18 DIAGNOSIS — I10 HYPERTENSION, UNSPECIFIED TYPE: ICD-10-CM

## 2022-04-18 DIAGNOSIS — F33.0 MILD EPISODE OF RECURRENT MAJOR DEPRESSIVE DISORDER (HCC): Primary | ICD-10-CM

## 2022-04-18 PROCEDURE — 99214 OFFICE O/P EST MOD 30 MIN: CPT | Performed by: NURSE PRACTITIONER

## 2022-04-18 RX ORDER — LOSARTAN POTASSIUM AND HYDROCHLOROTHIAZIDE 25; 100 MG/1; MG/1
TABLET ORAL
Qty: 90 TABLET | Refills: 3
Start: 2022-04-18 | End: 2022-10-20

## 2022-04-18 RX ORDER — FOLIC ACID 1 MG/1
1 TABLET ORAL DAILY
Qty: 30 TABLET | Refills: 0 | Status: SHIPPED | OUTPATIENT
Start: 2022-04-18 | End: 2022-05-16

## 2022-04-18 RX ORDER — BUPROPION HYDROCHLORIDE 300 MG/1
300 TABLET ORAL EVERY MORNING
Qty: 30 TABLET | Refills: 0 | Status: SHIPPED | OUTPATIENT
Start: 2022-04-18 | End: 2022-05-16

## 2022-04-18 ASSESSMENT — ENCOUNTER SYMPTOMS
ABDOMINAL PAIN: 0
ABDOMINAL DISTENTION: 0
SINUS PAIN: 0
VOMITING: 0
DIARRHEA: 0
SINUS PRESSURE: 0
WHEEZING: 0
EYE PAIN: 0
SORE THROAT: 0
EYE REDNESS: 0
NAUSEA: 0
SHORTNESS OF BREATH: 0
EYE DISCHARGE: 0
COUGH: 0

## 2022-04-18 NOTE — PROGRESS NOTES
Doug Dowd (:  1960) is a 64 y.o. female,Established patient, here for evaluation of the following chief complaint(s):  Depression (STILL HAVING DEPRESSION ISSUES, SEEING THERAPIST, SHE ADVISED THIS APPT. CANNOT GET OUT OF BED. SEEING DR. Ronnie Miller ON NEXT FRIDAY. CANT WAIT THAT LONG. LOOSING WEIGHT DUE TO NOT EATING )      ASSESSMENT/PLAN:  1. Mild episode of recurrent major depressive disorder ([address]  -Uncontrolled at this time. Discussed options with the patient. I educated to her that if she is thinking about performing a medication change that this would not be able to be performed safely by Monday. Will increase Wellbutrin to 300 mg and see if this helps. Continue with Pristiq 100 mg, Wellbutrin 300 mg, and Xanax as needed until seen by Dr. Erica Do next week. -     buPROPion (WELLBUTRIN XL) 300 MG extended release tablet; Take 1 tablet by mouth every morning, Disp-30 tablet, [id number]  -     folic acid (FOLVITE) 1 MG tablet; Take 1 tablet by mouth daily, Disp-30 tablet, R-0Normal  2. Generalized anxiety disorder  -Uncontrolled at this time. Discussed options with the patient. I educated to her that if she is thinking about performing a medication change that this would not be able to be performed safely by Monday. Will increase Wellbutrin to 300 mg and see if this helps. Continue with Pristiq 100 mg, Wellbutrin 300 mg, and Xanax as needed until seen by Dr. Erica Do next week. -     buPROPion (WELLBUTRIN XL) 300 MG extended release tablet; Take 1 tablet by mouth every morning, Disp-30 tablet, [id number]  -     folic acid (FOLVITE) 1 MG tablet; Take 1 tablet by mouth daily, Disp-30 tablet, R-0Normal  3. Hypertension, unspecified type  -Patient is hypotensive in office today. She had already cut back on her blood pressure medications. Decrease losartan-hydrochlorothiazide 2.5 tablet daily. Can follow-up at next appointment next week.   -     losartan-hydroCHLOROthiazide (HYZAAR) 100-25 MG per tablet; TAKE .5 TABLET BY MOUTH DAILY, Disp-90 tablet, R-3Adjust Sig        Return in about 11 days (around 4/29/2022) for Follow-up depression/anxiety. SUBJECTIVE/OBJECTIVE:  QUIN Acosta presents today regarding depression and anxiety. She states she has an appointment scheduled with Dr. Laci Funk next week, but she is unable to wait that long. She states that while her anxiety has improved, she continues to have significant issues with depression. She states that she has not felt a difference since increasing the Pristiq to 100 mg and starting the Wellbutrin at 150 mg. She has been doing this for a little over a month. She states she continues to be tearful. She made the decision to use quit her previous job. She is going to be starting a new job on Monday and is concerned that her anxiety and depression are going to be so bad that she needs to take off for her first day. She is inquiring about what can be done before Monday to help alleviate this issue. She states she has been seeing a therapist.  She told her therapist that she was struggling as well as having issues with losing weight. Her therapist encouraged her to make a follow-up appointment. Denies any suicidal ideation or thoughts of self-harm. Review of Systems   Constitutional: Positive for fatigue and unexpected weight change. Negative for chills and fever. HENT: Negative for ear discharge, ear pain, hearing loss, sinus pressure, sinus pain and sore throat. Eyes: Negative for pain, discharge and redness. Respiratory: Negative for cough, shortness of breath and wheezing. Cardiovascular: Negative for chest pain, palpitations and leg swelling. Gastrointestinal: Negative for abdominal distention, abdominal pain, diarrhea, nausea and vomiting. Genitourinary: Negative for dysuria and hematuria. Musculoskeletal: Negative for myalgias. Skin: Negative for rash.    Neurological: Negative for dizziness, weakness, light-headedness, numbness and headaches. Psychiatric/Behavioral: Positive for dysphoric mood and sleep disturbance. Negative for decreased concentration, self-injury and suicidal ideas. The patient is nervous/anxious. Physical Exam  Constitutional:       General: She is not in acute distress. Appearance: Normal appearance. She is obese. HENT:      Head: Normocephalic and atraumatic. Right Ear: Tympanic membrane, ear canal and external ear normal.      Left Ear: Tympanic membrane, ear canal and external ear normal.      Mouth/Throat:      Mouth: Mucous membranes are moist.   Eyes:      Extraocular Movements: Extraocular movements intact. Conjunctiva/sclera: Conjunctivae normal.      Pupils: Pupils are equal, round, and reactive to light. Neck:      Thyroid: No thyromegaly. Cardiovascular:      Rate and Rhythm: Normal rate and regular rhythm. Pulses: Normal pulses. Heart sounds: Normal heart sounds. No murmur heard. No gallop. Pulmonary:      Effort: Pulmonary effort is normal.      Breath sounds: Normal breath sounds. No wheezing. Abdominal:      General: Bowel sounds are normal.      Palpations: Abdomen is soft. Tenderness: There is no abdominal tenderness. There is no guarding or rebound. Musculoskeletal:         General: Normal range of motion. Cervical back: Normal range of motion and neck supple. Lymphadenopathy:      Cervical: No cervical adenopathy. Skin:     General: Skin is warm and dry. Capillary Refill: Capillary refill takes less than 2 seconds. Neurological:      Mental Status: She is alert and oriented to person, place, and time. Psychiatric:         Mood and Affect: Mood normal.         Behavior: Behavior normal.         Thought Content: Thought content normal.         Judgment: Judgment normal.      Comments: Tearful               This dictation was generated by voice recognition computer software.   Although all attempts are made to edit the dictation for

## 2022-04-20 ENCOUNTER — TELEPHONE (OUTPATIENT)
Dept: FAMILY MEDICINE CLINIC | Age: 62
End: 2022-04-20

## 2022-04-20 NOTE — TELEPHONE ENCOUNTER
Pt was seen yesterday - she was put on     Folic acid - she is at her Psych doctor and they are concerned that she doesn't really need this and that she is taking it    Pt @  [phone]

## 2022-04-29 ENCOUNTER — OFFICE VISIT (OUTPATIENT)
Dept: FAMILY MEDICINE CLINIC | Age: 62
End: 2022-04-29
Payer: COMMERCIAL

## 2022-04-29 VITALS
HEIGHT: 63 IN | BODY MASS INDEX: 33.49 KG/M2 | SYSTOLIC BLOOD PRESSURE: 102 MMHG | WEIGHT: 189 LBS | HEART RATE: 56 BPM | OXYGEN SATURATION: 99 % | DIASTOLIC BLOOD PRESSURE: 68 MMHG

## 2022-04-29 DIAGNOSIS — M25.561 CHRONIC PAIN OF RIGHT KNEE: ICD-10-CM

## 2022-04-29 DIAGNOSIS — M79.605 PAIN IN BOTH LOWER EXTREMITIES: ICD-10-CM

## 2022-04-29 DIAGNOSIS — M79.604 PAIN IN BOTH LOWER EXTREMITIES: ICD-10-CM

## 2022-04-29 DIAGNOSIS — F32.4 MAJOR DEPRESSIVE DISORDER IN PARTIAL REMISSION, UNSPECIFIED WHETHER RECURRENT (HCC): Primary | ICD-10-CM

## 2022-04-29 DIAGNOSIS — G89.29 CHRONIC PAIN OF RIGHT KNEE: ICD-10-CM

## 2022-04-29 DIAGNOSIS — I10 ESSENTIAL HYPERTENSION: ICD-10-CM

## 2022-04-29 DIAGNOSIS — F41.9 ANXIETY: ICD-10-CM

## 2022-04-29 PROCEDURE — 99214 OFFICE O/P EST MOD 30 MIN: CPT | Performed by: FAMILY MEDICINE

## 2022-04-29 RX ORDER — ALPRAZOLAM 1 MG/1
1 TABLET ORAL 3 TIMES DAILY PRN
Qty: 60 TABLET | Refills: 0 | Status: SHIPPED | OUTPATIENT
Start: 2022-04-29 | End: 2022-05-20 | Stop reason: SDUPTHER

## 2022-04-29 RX ORDER — LEVOMEFOLATE CALCIUM 15 MG
TABLET ORAL
Qty: 30 TABLET | Refills: 5 | Status: SHIPPED | OUTPATIENT
Start: 2022-04-29

## 2022-04-29 RX ORDER — HYDROCODONE BITARTRATE AND ACETAMINOPHEN 7.5; 325 MG/1; MG/1
.5-1 TABLET ORAL EVERY 8 HOURS PRN
Qty: 50 TABLET | Refills: 0 | Status: SHIPPED | OUTPATIENT
Start: 2022-04-29 | End: 2022-05-20 | Stop reason: SDUPTHER

## 2022-04-29 RX ORDER — LEVOMEFOLATE CALCIUM 15 MG
TABLET ORAL
Qty: 30 TABLET | Refills: 5 | Status: CANCELLED | OUTPATIENT
Start: 2022-04-29

## 2022-04-29 NOTE — LETTER
[address] [id number]  Phone: [phone]  Fax: [phone]    Daniela Sargent MD        April 29, 2022     Patient: Erik Araujo   YOB: 1960   Date of Visit: 4/29/2022       To Whom It May Concern: It is my medical opinion that Cristhian Cuello may return to work on 4/29/2022. If you have any questions or concerns, please don't hesitate to call.     Sincerely,        Daniela Sargent MD

## 2022-04-29 NOTE — PROGRESS NOTES
[facility]  Clinic Note    Date: 4/29/2022                                               Subjective:     Chief Complaint   Patient presents with    Anxiety     ANXIETY ROUTINE FOLLOW UP WANTS TO GO BACK TO [place]        [facility]    Anxiety/ depression issues - seen in office 4/18  Still anxious - but better w/ pristiq and 2/d xanax  Depression some better - more lonely / purposeless feeling  Worsening depression/ hard to get out of bed/ not eating  On pristiq - had wellbutrin increased 150 to 300 daily  On folate per genesight -reduced folate conversion  HAS THOUGHTS OF \" I'D BE BETTER OFF WITHOUT LIVING\" BUT NO PLAN/ INTENT OR WILL TO TAKE LIFE  New job at [employer] - 40h work week  Hopeful this will help - feels better getting back to work  Heart palpitations better on toprol  Wants to try to get of some medicine    BP Readings from Last 3 Encounters:   04/29/22 102/68   04/18/22 (!) 90/56   04/13/22 130/80     Pulse Readings from Last 3 Encounters:   04/29/22 56   04/18/22 54   03/30/22 92     Wt Readings from Last 3 Encounters:   04/29/22 189 lb (85.7 kg)   04/18/22 189 lb (85.7 kg)   03/30/22 197 lb (89.4 kg)            Patient Active Problem List    Diagnosis Date Noted    Generalized anxiety disorder 03/07/2022    Prediabetes 11/23/2021    Vitamin D insufficiency 10/31/2017    CTS (carpal tunnel syndrome) 10/02/2013    Lumbosacral strain     Urinary frequency     Phlebitis and thrombophlebitis of superficial vessels of lower extremities     Leg pain     Peritonsillar abscess     Insomnia     Palpitations     Other specified abnormal findings of blood chemistry     Other and unspecified ovarian cyst     Depression     Plantar fasciitis     Encounter for long-term (current) use of other medications     Pure hypercholesterolemia      Past Medical History:   Diagnosis Date    ADD (attention deficit disorder)     Cervical dysplasia     Chronic leg pain     Depression     Diabetes mellitus, type 2 9/16/2013    Fibrocystic breast     Genital herpes 1994    Hemorrhoid     Hyperlipidemia     Metabolic syndrome     Mood disorder (HCC)     MRSA cellulitis 2007    facial    Obesity     Panic attacks     Plantar fasciitis     Recurrent UTI     Urethral stenosis     Urinary incontinence, mixed      Past Surgical History:   Procedure Laterality Date    CARPAL TUNNEL RELEASE Right 9/26/13    CHOLECYSTECTOMY      HYSTERECTOMY, TOTAL ABDOMINAL  10/2006    LABIAL ADHESION LYSIS  10/2006    OTHER SURGICAL HISTORY      No problems with anesthesia, no problems with healing, no problems with blood clots after surgery.  OTHER SURGICAL HISTORY  5/95    lipoma removal under arm    PLANTAR FASCIA SURGERY  October 2009    MRSA after surgery.      Admission on 03/01/2022, Discharged on 03/01/2022   Component Date Value Ref Range Status    Ventricular Rate 03/01/2022 85  BPM Final    Atrial Rate 03/01/2022 85  BPM Final    P-R Interval 03/01/2022 172  ms Final    QRS Duration 03/01/2022 86  ms Final    Q-T Interval 03/01/2022 380  ms Final    QTc Calculation (Bazett) 03/01/2022 452  ms Final    P Axis 03/01/2022 75  degrees Final    R Axis 03/01/2022 -7  degrees Final    T Axis 03/01/2022 -20  degrees Final    Diagnosis 03/01/2022 Normal sinus rhythmPossible Left atrial enlargementST & T wave abnormality, consider anterior ischemiaAbnormal ECGWhen compared with ECG of 26-SEP-2013 06:51,T wave inversion now evident in Anterior leadsConfirmed by Giles GuadalupeSouth [facility] ([id number]) on 3/1/2022 5:42:27 PM   Final    WBC 03/01/2022 7.2  4.0 - 11.0 K/uL Final    RBC 03/01/2022 4.58  4.00 - 5.20 M/uL Final    Hemoglobin 03/01/2022 13.6  12.0 - 16.0 g/dL Final    Hematocrit 03/01/2022 40.4  36.0 - 48.0 % Final    MCV 03/01/2022 88.2  80.0 - 100.0 fL Final    MCH 03/01/2022 29.8  26.0 - 34.0 pg Final    MCHC 03/01/2022 33.7  31.0 - 36.0 g/dL Final    RDW 03/01/2022 14.5  12.4 - 15.4 % Final    Platelets 25/03/6302 256  135 - 450 K/uL Final    MPV 03/01/2022 9.9  5.0 - 10.5 fL Final    Neutrophils % 03/01/2022 69.3  % Final    Lymphocytes % 03/01/2022 21.3  % Final    Monocytes % 03/01/2022 7.3  % Final    Eosinophils % 03/01/2022 1.2  % Final    Basophils % 03/01/2022 0.9  % Final    Neutrophils Absolute 03/01/2022 5.0  1.7 - 7.7 K/uL Final    Lymphocytes Absolute 03/01/2022 1.5  1.0 - 5.1 K/uL Final    Monocytes Absolute 03/01/2022 0.5  0.0 - 1.3 K/uL Final    Eosinophils Absolute 03/01/2022 0.1  0.0 - 0.6 K/uL Final    Basophils Absolute 03/01/2022 0.1  0.0 - 0.2 K/uL Final    Sodium 03/01/2022 136  136 - 145 mmol/L Final    Potassium reflex Magnesium 03/01/2022 2.8* 3.5 - 5.1 mmol/L Final    Chloride 03/01/2022 95* 99 - 110 mmol/L Final    CO2 03/01/2022 27  21 - 32 mmol/L Final    Anion Gap 03/01/2022 14  3 - 16 Final    Glucose 03/01/2022 119* 70 - 99 mg/dL Final    BUN 03/01/2022 21* 7 - 20 mg/dL Final    CREATININE 03/01/2022 0.7  0.6 - 1.2 mg/dL Final    GFR Non- 03/01/2022 >60  >60 Final    Comment: >60 mL/min/1.73m2 EGFR, calc. for ages 25 and older using the  MDRD formula (not corrected for weight), is valid for stable  renal function.  GFR  03/01/2022 >60  >60 Final    Comment: Chronic Kidney Disease: less than 60 ml/min/1.73 sq.m. Kidney Failure: less than 15 ml/min/1.73 sq.m. Results valid for patients 18 years and older.       Calcium 03/01/2022 9.7  8.3 - 10.6 mg/dL Final    Total Protein 03/01/2022 7.3  6.4 - 8.2 g/dL Final    Albumin 03/01/2022 4.7  3.4 - 5.0 g/dL Final    Albumin/Globulin Ratio 03/01/2022 1.8  1.1 - 2.2 Final    Total Bilirubin 03/01/2022 0.4  0.0 - 1.0 mg/dL Final    Alkaline Phosphatase 03/01/2022 47  40 - 129 U/L Final    ALT 03/01/2022 21  10 - 40 U/L Final    AST 03/01/2022 21  15 - 37 U/L Final    Troponin 03/01/2022 <0.01  <0.01 ng/mL Final Methodology by Troponin T    TSH 03/01/2022 1.91  0.27 - 4.20 uIU/mL Final    Magnesium 03/01/2022 1.70* 1.80 - 2.40 mg/dL Final     Family History   Problem Relation Age of Onset    Diabetes Mother     Drug Abuse Mother     Psoriasis Mother     Asthma Mother     COPD Mother     Other Mother         shingles; cervical dysplasia    Heart Disease Father     Diabetes Father     Other Father         kidney stones    Asthma Maternal Aunt     Diabetes Maternal Aunt     Heart Disease Maternal Aunt     Other Maternal Aunt         hep B/cirrhosis    Diabetes Other     Other Sister         1958 gallstones    Diabetes Sister     Cancer Sister         1958 hyst d/t Ca    Scoliosis Son     Other Son         hx of Cat scratch disease?  Heart Disease Son         cardiomyopathy    Asthma Son      Current Outpatient Medications   Medication Sig Dispense Refill    buPROPion (WELLBUTRIN XL) 300 MG extended release tablet Take 1 tablet by mouth every morning 30 tablet 0    losartan-hydroCHLOROthiazide (HYZAAR) 100-25 MG per tablet TAKE .5 TABLET BY MOUTH DAILY 90 tablet 3    folic acid (FOLVITE) 1 MG tablet Take 1 tablet by mouth daily 30 tablet 0    desvenlafaxine succinate (PRISTIQ) 100 MG TB24 extended release tablet Take 1 tablet by mouth nightly 30 tablet 3    ALPRAZolam (XANAX) 1 MG tablet Take 1 tablet by mouth 3 times daily as needed for Sleep for up to 30 days. 60 tablet 0    HYDROcodone-acetaminophen (NORCO) 7.5-325 MG per tablet Take 0.5-1 tablets by mouth every 8 hours as needed for Pain for up to 30 days.  50 tablet 0    metoprolol succinate (TOPROL XL) 25 MG extended release tablet TAKE 1 TABLET BY  MOUTH IN THE MORNING AND ONE TO TWO TABLETS IN THE EVENING AS DIRECTED 90 tablet 2    estradiol (ESTRACE VAGINAL) 0.1 MG/GM vaginal cream Place 1 g vaginally daily For 2 weeks then twice weekly 1 each 11    atorvastatin (LIPITOR) 40 MG tablet 1 po daily (Patient taking differently: Take 20 mg by mouth daily ) 90 tablet 3     No current facility-administered medications for this visit. Allergies   Allergen Reactions    Sulfa Antibiotics        Review of Systems    Objective: There were no vitals taken for this visit. BP Readings from Last 3 Encounters:   04/18/22 (!) 90/56   04/13/22 130/80   03/30/22 122/76       Pulse Readings from Last 3 Encounters:   04/18/22 54   03/30/22 92   03/01/22 75       Wt Readings from Last 3 Encounters:   04/18/22 189 lb (85.7 kg)   03/30/22 197 lb (89.4 kg)   03/04/22 207 lb (93.9 kg)       Physical Exam  Vitals and nursing note reviewed. Constitutional:       Appearance: She is well-developed. HENT:      Head: Normocephalic and atraumatic. Eyes:      General: No scleral icterus. Conjunctiva/sclera: Conjunctivae normal.   Pulmonary:      Effort: Pulmonary effort is normal.   Skin:     General: Skin is warm and dry. Neurological:      Mental Status: She is alert and oriented to person, place, and time. Psychiatric:      Comments: Sl anxious, flat affect         Assessment/Plan:      Diagnosis Orders   1. Major depressive disorder in partial remission, unspecified whether recurrent ([address]     2. Anxiety  ALPRAZolam (XANAX) 1 MG tablet   3. Pain in both lower extremities  HYDROcodone-acetaminophen (NORCO) 7.5-325 MG per tablet   4. Chronic pain of right knee  HYDROcodone-acetaminophen (NORCO) 7.5-325 MG per tablet   5.  Essential hypertension           Note to RTW for today  Stop hyzaar as bp still low on 1/2 tab (50/12.5mg)  Will recheck in 1 month on f/u visit  Reduce wellbutrin to 150 at pt request and after 2 weeks if doing okay,  Change folate to deplin if affordable  Cont pristiq 100/d  Xanax prn bid as working well and tolerated well  Reduce pain medication from 50 to 45/ months - using prn for leg pain - agreeable to slowly weaning down to lowest number to function  Cont to see therapist - suicide plan dw/ pt if thinks of taking life  oarrs reviewed and results c/w rx'd meds  Xanax 1mg bid prn #60 on 4/7  norco #50 on 3/30 -   Brannon Rubio MD, MD  4/29/2022  7:46 AM

## 2022-05-14 DIAGNOSIS — F41.1 GENERALIZED ANXIETY DISORDER: ICD-10-CM

## 2022-05-14 DIAGNOSIS — F33.0 MILD EPISODE OF RECURRENT MAJOR DEPRESSIVE DISORDER (HCC): ICD-10-CM

## 2022-05-16 RX ORDER — BUPROPION HYDROCHLORIDE 300 MG/1
TABLET ORAL
Qty: 30 TABLET | Refills: 2 | Status: SHIPPED | OUTPATIENT
Start: 2022-05-16 | End: 2022-05-20 | Stop reason: ALTCHOICE

## 2022-05-16 RX ORDER — FOLIC ACID 1 MG/1
TABLET ORAL
Qty: 30 TABLET | Refills: 2 | Status: SHIPPED | OUTPATIENT
Start: 2022-05-16

## 2022-05-20 ENCOUNTER — OFFICE VISIT (OUTPATIENT)
Dept: FAMILY MEDICINE CLINIC | Age: 62
End: 2022-05-20
Payer: COMMERCIAL

## 2022-05-20 VITALS
HEART RATE: 52 BPM | OXYGEN SATURATION: 98 % | HEIGHT: 63 IN | SYSTOLIC BLOOD PRESSURE: 120 MMHG | WEIGHT: 190 LBS | BODY MASS INDEX: 33.66 KG/M2 | DIASTOLIC BLOOD PRESSURE: 72 MMHG

## 2022-05-20 DIAGNOSIS — F41.9 ANXIETY: ICD-10-CM

## 2022-05-20 DIAGNOSIS — I49.3 FREQUENT PVCS: Primary | ICD-10-CM

## 2022-05-20 DIAGNOSIS — M79.605 PAIN IN BOTH LOWER EXTREMITIES: ICD-10-CM

## 2022-05-20 DIAGNOSIS — G89.29 CHRONIC PAIN OF RIGHT KNEE: ICD-10-CM

## 2022-05-20 DIAGNOSIS — F41.1 GENERALIZED ANXIETY DISORDER: ICD-10-CM

## 2022-05-20 DIAGNOSIS — M25.561 CHRONIC PAIN OF RIGHT KNEE: ICD-10-CM

## 2022-05-20 DIAGNOSIS — R73.03 PREDIABETES: ICD-10-CM

## 2022-05-20 DIAGNOSIS — M79.604 PAIN IN BOTH LOWER EXTREMITIES: ICD-10-CM

## 2022-05-20 LAB — HBA1C MFR BLD: 5.6 %

## 2022-05-20 PROCEDURE — 83036 HEMOGLOBIN GLYCOSYLATED A1C: CPT | Performed by: FAMILY MEDICINE

## 2022-05-20 PROCEDURE — 99214 OFFICE O/P EST MOD 30 MIN: CPT | Performed by: FAMILY MEDICINE

## 2022-05-20 RX ORDER — DESVENLAFAXINE 100 MG/1
100 TABLET, EXTENDED RELEASE ORAL NIGHTLY
Qty: 30 TABLET | Refills: 5 | Status: SHIPPED | OUTPATIENT
Start: 2022-05-20 | End: 2022-07-26 | Stop reason: SDUPTHER

## 2022-05-20 RX ORDER — ALPRAZOLAM 1 MG/1
1 TABLET ORAL 3 TIMES DAILY PRN
Qty: 60 TABLET | Refills: 1 | Status: SHIPPED | OUTPATIENT
Start: 2022-05-20 | End: 2022-06-19

## 2022-05-20 RX ORDER — HYDROCODONE BITARTRATE AND ACETAMINOPHEN 7.5; 325 MG/1; MG/1
1 TABLET ORAL EVERY 8 HOURS PRN
Qty: 35 TABLET | Refills: 0 | Status: SHIPPED | OUTPATIENT
Start: 2022-05-20 | End: 2022-07-26 | Stop reason: SDUPTHER

## 2022-05-20 RX ORDER — HYDROCODONE BITARTRATE AND ACETAMINOPHEN 7.5; 325 MG/1; MG/1
.5-1 TABLET ORAL EVERY 8 HOURS PRN
Qty: 40 TABLET | Refills: 0 | Status: SHIPPED | OUTPATIENT
Start: 2022-05-20 | End: 2022-06-19

## 2022-05-20 NOTE — PROGRESS NOTES
Genital herpes 1994    Hemorrhoid     Hyperlipidemia     Metabolic syndrome     Mood disorder ([facility] [address]     MRSA cellulitis 2007    facial    Obesity     Panic attacks     Plantar fasciitis     Recurrent UTI     Urethral stenosis     Urinary incontinence, mixed      Past Surgical History:   Procedure Laterality Date    CARPAL TUNNEL RELEASE Right 9/26/13    CHOLECYSTECTOMY      HYSTERECTOMY, TOTAL ABDOMINAL  10/2006    LABIAL ADHESION LYSIS  10/2006    OTHER SURGICAL HISTORY      No problems with anesthesia, no problems with healing, no problems with blood clots after surgery.  OTHER SURGICAL HISTORY  5/95    lipoma removal under arm    PLANTAR FASCIA SURGERY  October 2009    MRSA after surgery.      Admission on 03/01/2022, Discharged on 03/01/2022   Component Date Value Ref Range Status    Ventricular Rate 03/01/2022 85  BPM Final    Atrial Rate 03/01/2022 85  BPM Final    P-R Interval 03/01/2022 172  ms Final    QRS Duration 03/01/2022 86  ms Final    Q-T Interval 03/01/2022 380  ms Final    QTc Calculation ([place]ett) 03/01/2022 452  ms Final    P Axis 03/01/2022 75  degrees Final    R Axis 03/01/2022 -7  degrees Final    T Axis 03/01/2022 -20  degrees Final    Diagnosis 03/01/2022 Normal sinus rhythmPossible Left atrial enlargementST & T wave abnormality, consider anterior ischemiaAbnormal ECGWhen compared with ECG of 26-SEP-2013 06:51,T wave inversion now evident in Anterior leadsConfirmed by Tony Murry, [address] [place] [address] ([id number]) on 3/1/2022 5:42:27 PM   Final    WBC 03/01/2022 7.2  4.0 - 11.0 K/uL Final    RBC 03/01/2022 4.58  4.00 - 5.20 M/uL Final    Hemoglobin 03/01/2022 13.6  12.0 - 16.0 g/dL Final    Hematocrit 03/01/2022 40.4  36.0 - 48.0 % Final    MCV 03/01/2022 88.2  80.0 - 100.0 fL Final    MCH 03/01/2022 29.8  26.0 - 34.0 pg Final    MCHC 03/01/2022 33.7  31.0 - 36.0 g/dL Final    RDW 03/01/2022 14.5  12.4 - 15.4 % Final    Platelets 08/18/8716 256  135 - 450 K/uL Final    MPV 03/01/2022 9.9  5.0 - 10.5 fL Final    Neutrophils % 03/01/2022 69.3  % Final    Lymphocytes % 03/01/2022 21.3  % Final    Monocytes % 03/01/2022 7.3  % Final    Eosinophils % 03/01/2022 1.2  % Final    Basophils % 03/01/2022 0.9  % Final    Neutrophils Absolute 03/01/2022 5.0  1.7 - 7.7 K/uL Final    Lymphocytes Absolute 03/01/2022 1.5  1.0 - 5.1 K/uL Final    Monocytes Absolute 03/01/2022 0.5  0.0 - 1.3 K/uL Final    Eosinophils Absolute 03/01/2022 0.1  0.0 - 0.6 K/uL Final    Basophils Absolute 03/01/2022 0.1  0.0 - 0.2 K/uL Final    Sodium 03/01/2022 136  136 - 145 mmol/L Final    Potassium reflex Magnesium 03/01/2022 2.8* 3.5 - 5.1 mmol/L Final    Chloride 03/01/2022 95* 99 - 110 mmol/L Final    CO2 03/01/2022 27  21 - 32 mmol/L Final    Anion Gap 03/01/2022 14  3 - 16 Final    Glucose 03/01/2022 119* 70 - 99 mg/dL Final    BUN 03/01/2022 21* 7 - 20 mg/dL Final    CREATININE 03/01/2022 0.7  0.6 - 1.2 mg/dL Final    GFR Non- 03/01/2022 >60  >60 Final    Comment: >60 mL/min/1.73m2 EGFR, calc. for ages 25 and older using the  MDRD formula (not corrected for weight), is valid for stable  renal function.  GFR  03/01/2022 >60  >60 Final    Comment: Chronic Kidney Disease: less than 60 ml/min/1.73 sq.m. Kidney Failure: less than 15 ml/min/1.73 sq.m. Results valid for patients 18 years and older.       Calcium 03/01/2022 9.7  8.3 - 10.6 mg/dL Final    Total Protein 03/01/2022 7.3  6.4 - 8.2 g/dL Final    Albumin 03/01/2022 4.7  3.4 - 5.0 g/dL Final    Albumin/Globulin Ratio 03/01/2022 1.8  1.1 - 2.2 Final    Total Bilirubin 03/01/2022 0.4  0.0 - 1.0 mg/dL Final    Alkaline Phosphatase 03/01/2022 47  40 - 129 U/L Final    ALT 03/01/2022 21  10 - 40 U/L Final    AST 03/01/2022 21  15 - 37 U/L Final    Troponin 03/01/2022 <0.01  <0.01 ng/mL Final    Methodology by Troponin T    TSH 03/01/2022 1.91  0.27 - 4.20 uIU/mL Final    Magnesium 03/01/2022 1.70* 1.80 - 2.40 mg/dL Final     Family History   Problem Relation Age of Onset    Diabetes Mother     Drug Abuse Mother     Psoriasis Mother     Asthma Mother    [facility] COPD Mother     Other Mother         shingles; cervical dysplasia    Heart Disease Father     Diabetes Father     Other Father         kidney stones    Asthma Maternal Aunt     Diabetes Maternal Aunt     Heart Disease Maternal Aunt     Other Maternal Aunt         hep B/cirrhosis    Diabetes Other     Other Sister         1958 gallstones    Diabetes Sister     Cancer Sister         1958 hyst d/t Ca    Scoliosis Son     Other Son         hx of Cat scratch disease?  Heart Disease Son         cardiomyopathy    Asthma Son      Current Outpatient Medications   Medication Sig Dispense Refill    folic acid (FOLVITE) 1 MG tablet TAKE ONE TABLET BY MOUTH DAILY 30 tablet 2    ALPRAZolam (XANAX) 1 MG tablet Take 1 tablet by mouth 3 times daily as needed for Sleep for up to 30 days. 60 tablet 0    HYDROcodone-acetaminophen (NORCO) 7.5-325 MG per tablet Take 0.5-1 tablets by mouth every 8 hours as needed for Pain for up to 30 days. 50 tablet 0    L-Methylfolate 15 MG TABS 1 po daily 30 tablet 5    losartan-hydroCHLOROthiazide (HYZAAR) 100-25 MG per tablet TAKE .5 TABLET BY MOUTH DAILY 90 tablet 3    desvenlafaxine succinate (PRISTIQ) 100 MG TB24 extended release tablet Take 1 tablet by mouth nightly 30 tablet 3    metoprolol succinate (TOPROL XL) 25 MG extended release tablet TAKE 1 TABLET BY  MOUTH IN THE MORNING AND ONE TO TWO TABLETS IN THE EVENING AS DIRECTED 90 tablet 2    estradiol (ESTRACE VAGINAL) 0.1 MG/GM vaginal cream Place 1 g vaginally daily For 2 weeks then twice weekly 1 each 11    atorvastatin (LIPITOR) 40 MG tablet 1 po daily (Patient taking differently: Take 20 mg by mouth daily ) 90 tablet 3     No current facility-administered medications for this visit.      Allergies   Allergen Reactions    Sulfa Antibiotics Review of Systems    Objective:  /72 (Site: Left Upper Arm, Position: Sitting, Cuff Size: Medium Adult)   Pulse 52   Ht 5' 3\" (1.6 m)   Wt 190 lb (86.2 kg)   LMP  (Exact Date)   SpO2 98%   Breastfeeding No   BMI 33.66 kg/m²     BP Readings from Last 3 Encounters:   05/20/22 120/72   04/29/22 102/68   04/18/22 (!) 90/56       Pulse Readings from Last 3 Encounters:   05/20/22 52   04/29/22 56   04/18/22 54       Wt Readings from Last 3 Encounters:   05/20/22 190 lb (86.2 kg)   04/29/22 189 lb (85.7 kg)   04/18/22 189 lb (85.7 kg)       Physical Exam  Vitals and nursing note reviewed. Constitutional:       Appearance: She is well-developed. HENT:      Head: Normocephalic and atraumatic. Eyes:      General: No scleral icterus. Conjunctiva/sclera: Conjunctivae normal.   Pulmonary:      Effort: Pulmonary effort is normal.   Musculoskeletal:         General: No swelling. Skin:     General: Skin is warm and dry. Neurological:      Mental Status: She is alert and oriented to person, place, and time. Assessment/Plan:     Pain in both lower extremities  The following orders have not been finalized:  -     HYDROcodone-acetaminophen ([place] [employer]) 7.5-325 MG per tablet  Chronic pain of right knee  The following orders have not been finalized:  -     HYDROcodone-acetaminophen ([place] [employer]) 7.5-325 MG per tablet  Anxiety  The following orders have not been finalized:  -     ALPRAZolam (XANAX) 1 MG tablet  Generalized anxiety disorder  The following orders have not been finalized:  -     desvenlafaxine succinate (PRISTIQ) 100 MG TB24 extended release tablet     Cont metoprolol 25/d for pvc's at present.  O/w consider cardio as unable to raise dose  bp stable off hyzaar  No meds - dm in remission - 5.6%  Reviewed labs - mg supplement  Consider good quality folic acid as deplin very expensive  Refill pristiq - mood much better - off wellbutrin  Xanax 1mg bid - try 1/2 tab on pm dose d/w pt  oarrs reviewed and results c/w rx'd meds  Xanax 1mg #60 on 4/29  norco 7.5 4/29 #50  Reduce to #40/ month and reduce by 5/month  F/u 2 months  Khanh Long MD, MD  5/20/2022  7:49 AM

## 2022-07-24 DIAGNOSIS — F41.1 GENERALIZED ANXIETY DISORDER: ICD-10-CM

## 2022-07-25 RX ORDER — DESVENLAFAXINE 100 MG/1
TABLET, EXTENDED RELEASE ORAL
Qty: 30 TABLET | Refills: 5 | OUTPATIENT
Start: 2022-07-25

## 2022-07-25 NOTE — TELEPHONE ENCOUNTER
[facility] 5/20/22 WITH DR KIMBERLY HERNÁNDEZ 7/26/22    desvenlafaxine succinate (PRISTIQ) 100 MG TB24 extended release tablet 30 tablet 5 5/20/2022     Sig - Route:  Take 1 tablet by mouth nightly - Oral    Sent to pharmacy as: Desvenlafaxine Succinate  MG Oral Tablet Extended Release 24 Hour (PRISTIQ)    E-Prescribing Status: Receipt confirmed by pharmacy (5/20/2022  9:47 AM EDT)    DUPLICATE REQUEST

## 2022-07-26 ENCOUNTER — OFFICE VISIT (OUTPATIENT)
Dept: FAMILY MEDICINE CLINIC | Age: 62
End: 2022-07-26
Payer: COMMERCIAL

## 2022-07-26 VITALS
SYSTOLIC BLOOD PRESSURE: 120 MMHG | WEIGHT: 181 LBS | HEIGHT: 63 IN | DIASTOLIC BLOOD PRESSURE: 72 MMHG | BODY MASS INDEX: 32.07 KG/M2

## 2022-07-26 DIAGNOSIS — E87.6 HYPOKALEMIA: ICD-10-CM

## 2022-07-26 DIAGNOSIS — I10 ESSENTIAL HYPERTENSION: ICD-10-CM

## 2022-07-26 DIAGNOSIS — M25.561 CHRONIC PAIN OF RIGHT KNEE: ICD-10-CM

## 2022-07-26 DIAGNOSIS — G89.29 CHRONIC PAIN OF RIGHT KNEE: ICD-10-CM

## 2022-07-26 DIAGNOSIS — M79.605 PAIN IN BOTH LOWER EXTREMITIES: ICD-10-CM

## 2022-07-26 DIAGNOSIS — F41.1 GENERALIZED ANXIETY DISORDER: ICD-10-CM

## 2022-07-26 DIAGNOSIS — M79.604 PAIN IN BOTH LOWER EXTREMITIES: ICD-10-CM

## 2022-07-26 DIAGNOSIS — R73.9 HYPERGLYCEMIA: ICD-10-CM

## 2022-07-26 DIAGNOSIS — E78.00 PURE HYPERCHOLESTEROLEMIA: Primary | ICD-10-CM

## 2022-07-26 PROCEDURE — 99214 OFFICE O/P EST MOD 30 MIN: CPT | Performed by: FAMILY MEDICINE

## 2022-07-26 RX ORDER — HYDROCODONE BITARTRATE AND ACETAMINOPHEN 7.5; 325 MG/1; MG/1
1 TABLET ORAL 2 TIMES DAILY PRN
Qty: 35 TABLET | Refills: 0 | Status: SHIPPED | OUTPATIENT
Start: 2022-07-28 | End: 2022-10-20 | Stop reason: SDUPTHER

## 2022-07-26 RX ORDER — DESVENLAFAXINE 100 MG/1
100 TABLET, EXTENDED RELEASE ORAL NIGHTLY
Qty: 90 TABLET | Refills: 3 | Status: SHIPPED | OUTPATIENT
Start: 2022-07-26 | End: 2022-10-20 | Stop reason: SDUPTHER

## 2022-07-26 RX ORDER — HYDROCODONE BITARTRATE AND ACETAMINOPHEN 7.5; 325 MG/1; MG/1
.5-1 TABLET ORAL EVERY 8 HOURS PRN
Qty: 35 TABLET | Refills: 0 | Status: SHIPPED | OUTPATIENT
Start: 2022-09-26 | End: 2022-10-20 | Stop reason: SDUPTHER

## 2022-07-26 RX ORDER — ALPRAZOLAM 1 MG/1
TABLET ORAL
COMMUNITY
Start: 2022-06-25 | End: 2022-07-26 | Stop reason: SDUPTHER

## 2022-07-26 RX ORDER — ALPRAZOLAM 1 MG/1
TABLET ORAL
Qty: 60 TABLET | Refills: 2 | Status: SHIPPED | OUTPATIENT
Start: 2022-07-26 | End: 2022-08-25

## 2022-07-26 RX ORDER — HYDROCODONE BITARTRATE AND ACETAMINOPHEN 7.5; 325 MG/1; MG/1
.5-1 TABLET ORAL EVERY 8 HOURS PRN
Qty: 35 TABLET | Refills: 0 | Status: SHIPPED | OUTPATIENT
Start: 2022-08-27 | End: 2022-10-20 | Stop reason: SDUPTHER

## 2022-07-26 RX ORDER — HYDROCODONE BITARTRATE AND ACETAMINOPHEN 7.5; 325 MG/1; MG/1
TABLET ORAL
COMMUNITY
Start: 2022-06-28 | End: 2022-07-26 | Stop reason: SDUPTHER

## 2022-07-26 NOTE — PROGRESS NOTES
[facility]  Clinic Note    Date: 7/26/2022                                               Subjective:     Chief Complaint   Patient presents with    Hypertension     HTN ROUTINE FOLLOW UP     Anxiety     ANXIETY ROUTINE FOLLOW UP      HPI    Taking xanax bid  Mood overall better  Losing weight still  Bp has been good  Afraid of reducing xanax.   Gets anxious thinking   Working at [employer] job - standing a lot more  Still uses pain medication at night for pain - worse w/ prolonged standing  Cut back on opiate dose from 120/ month to 35/month  Uses nightly and prn  BP Readings from Last 3 Encounters:   07/26/22 120/72   05/20/22 120/72   04/29/22 102/68     Pulse Readings from Last 3 Encounters:   05/20/22 52   04/29/22 56   04/18/22 54     Wt Readings from Last 3 Encounters:   07/26/22 181 lb (82.1 kg)   05/20/22 190 lb (86.2 kg)   04/29/22 189 lb (85.7 kg)            Patient Active Problem List    Diagnosis Date Noted    Generalized anxiety disorder 03/07/2022    Prediabetes 11/23/2021    Vitamin D insufficiency 10/31/2017    CTS (carpal tunnel syndrome) 10/02/2013    Lumbosacral strain     Urinary frequency     Phlebitis and thrombophlebitis of superficial vessels of lower extremities     Leg pain     Peritonsillar abscess     Insomnia     Palpitations     Other specified abnormal findings of blood chemistry     Other and unspecified ovarian cyst     Depression     Plantar fasciitis     Encounter for long-term (current) use of other medications     Pure hypercholesterolemia      Past Medical History:   Diagnosis Date    ADD (attention deficit disorder)     Cervical dysplasia     Chronic leg pain     Depression     Diabetes mellitus, type 2 9/16/2013    Fibrocystic breast     Genital herpes 1994    Hemorrhoid     Hyperlipidemia     Metabolic syndrome     Mood disorder ([facility] [address]     MRSA cellulitis 2007    facial    Obesity     Panic attacks     Plantar fasciitis     Recurrent UTI     Urethral stenosis Urinary incontinence, mixed      Past Surgical History:   Procedure Laterality Date    CARPAL TUNNEL RELEASE Right 9/26/13    CHOLECYSTECTOMY      HYSTERECTOMY, TOTAL ABDOMINAL (CERVIX REMOVED)  10/2006    LABIAL ADHESION LYSIS  10/2006    OTHER SURGICAL HISTORY      No problems with anesthesia, no problems with healing, no problems with blood clots after surgery. OTHER SURGICAL HISTORY  5/95    lipoma removal under arm    PLANTAR FASCIA SURGERY  October 2009    MRSA after surgery. Office Visit on 05/20/2022   Component Date Value Ref Range Status    Hemoglobin A1C 05/20/2022 5.6  % Final     Family History   Problem Relation Age of Onset    Diabetes Mother     Drug Abuse Mother     Psoriasis Mother     Asthma Mother     COPD Mother     Other Mother         shingles; cervical dysplasia    Heart Disease Father     Diabetes Father     Other Father         kidney stones    Asthma Maternal Aunt     Diabetes Maternal Aunt     Heart Disease Maternal Aunt     Other Maternal Aunt         hep B/cirrhosis    Diabetes Other     Other Sister         1958 gallstones    Diabetes Sister     Cancer Sister         1958 hyst d/t Ca    Scoliosis Son     Other Son         hx of Cat scratch disease?     Heart Disease Son         cardiomyopathy    Asthma Son      Current Outpatient Medications   Medication Sig Dispense Refill    ALPRAZolam (XANAX) 1 MG tablet       HYDROcodone-acetaminophen (NORCO) 7.5-325 MG per tablet       desvenlafaxine succinate (PRISTIQ) 100 MG TB24 extended release tablet Take 1 tablet by mouth nightly 30 tablet 5    folic acid (FOLVITE) 1 MG tablet TAKE ONE TABLET BY MOUTH DAILY 30 tablet 2    L-Methylfolate 15 MG TABS 1 po daily 30 tablet 5    losartan-hydroCHLOROthiazide (HYZAAR) 100-25 MG per tablet TAKE .5 TABLET BY MOUTH DAILY 90 tablet 3    metoprolol succinate (TOPROL XL) 25 MG extended release tablet TAKE 1 TABLET BY  MOUTH IN THE MORNING AND ONE TO TWO TABLETS IN THE EVENING AS DIRECTED 90 tablet 2    estradiol (ESTRACE VAGINAL) 0.1 MG/GM vaginal cream Place 1 g vaginally daily For 2 weeks then twice weekly 1 each 11    atorvastatin (LIPITOR) 40 MG tablet 1 po daily (Patient taking differently: Take 20 mg by mouth in the morning.) 90 tablet 3     No current facility-administered medications for this visit. Allergies   Allergen Reactions    Sulfa Antibiotics        Review of Systems    Objective:  /72 (Site: Left Upper Arm, Position: Sitting, Cuff Size: Medium Adult)   Ht 5' 3\" (1.6 m)   Wt 181 lb (82.1 kg)   BMI 32.06 kg/m²     BP Readings from Last 3 Encounters:   07/26/22 120/72   05/20/22 120/72   04/29/22 102/68       Pulse Readings from Last 3 Encounters:   05/20/22 52   04/29/22 56   04/18/22 54       Wt Readings from Last 3 Encounters:   07/26/22 181 lb (82.1 kg)   05/20/22 190 lb (86.2 kg)   04/29/22 189 lb (85.7 kg)       Physical Exam  Vitals and nursing note reviewed. Constitutional:       Appearance: She is well-developed. HENT:      Head: Normocephalic and atraumatic. Eyes:      General: No scleral icterus. Conjunctiva/sclera: Conjunctivae normal.   Pulmonary:      Effort: Pulmonary effort is normal.   Skin:     General: Skin is warm and dry. Neurological:      Mental Status: She is alert and oriented to person, place, and time. Assessment/Plan:      Diagnosis Orders   1. Pure hypercholesterolemia        2. Generalized anxiety disorder        3. Essential hypertension        4.  Pain in both lower extremities        Doing well overall  Congrats on lifestyle changes  Slowly reduce xanax - cut down to 1mg am, 1/2 mg hs  D/w pt niche use for xanax is not routine but to use prn severe anxiety  Cont L methylfolate/ pristiq  Continue regular therapy sessions  Bp stable on hyzaar/ toprol  Norco #35 7.5mg dose/ month for now 2/2 chronic pain  oarrs reviewed and results c/w rx'd meds  Doing well w/ reduction in norco and continue to use prn along w/ reduction in xanax  F/u 3 months w/ labs - sooner prn  Dong Clarke MD, MD  7/26/2022  10:46 AM

## 2022-08-19 RX ORDER — METOPROLOL SUCCINATE 25 MG/1
TABLET, EXTENDED RELEASE ORAL
Qty: 90 TABLET | Refills: 2 | Status: SHIPPED | OUTPATIENT
Start: 2022-08-19

## 2022-09-23 ENCOUNTER — NURSE ONLY (OUTPATIENT)
Dept: FAMILY MEDICINE CLINIC | Age: 62
End: 2022-09-23
Payer: COMMERCIAL

## 2022-09-23 DIAGNOSIS — R63.5 WEIGHT GAIN: Primary | ICD-10-CM

## 2022-09-23 DIAGNOSIS — E78.00 PURE HYPERCHOLESTEROLEMIA: ICD-10-CM

## 2022-09-23 DIAGNOSIS — E87.6 HYPOKALEMIA: ICD-10-CM

## 2022-09-23 DIAGNOSIS — I10 ESSENTIAL HYPERTENSION: ICD-10-CM

## 2022-09-23 DIAGNOSIS — R73.9 HYPERGLYCEMIA: ICD-10-CM

## 2022-09-23 LAB
A/G RATIO: 2.3 (ref 1.1–2.2)
ALBUMIN SERPL-MCNC: 4.4 G/DL (ref 3.4–5)
ALP BLD-CCNC: 62 U/L (ref 40–129)
ALT SERPL-CCNC: 10 U/L (ref 10–40)
ANION GAP SERPL CALCULATED.3IONS-SCNC: 11 MMOL/L (ref 3–16)
AST SERPL-CCNC: 14 U/L (ref 15–37)
BILIRUB SERPL-MCNC: 0.3 MG/DL (ref 0–1)
BUN BLDV-MCNC: 15 MG/DL (ref 7–20)
CALCIUM SERPL-MCNC: 9.2 MG/DL (ref 8.3–10.6)
CHLORIDE BLD-SCNC: 105 MMOL/L (ref 99–110)
CHOLESTEROL, TOTAL: 159 MG/DL (ref 0–199)
CO2: 27 MMOL/L (ref 21–32)
CREAT SERPL-MCNC: 1 MG/DL (ref 0.6–1.2)
GFR AFRICAN AMERICAN: >60
GFR NON-AFRICAN AMERICAN: 56
GLUCOSE BLD-MCNC: 108 MG/DL (ref 70–99)
HDLC SERPL-MCNC: 53 MG/DL (ref 40–60)
LDL CHOLESTEROL CALCULATED: 94 MG/DL
MAGNESIUM: 2.3 MG/DL (ref 1.8–2.4)
POTASSIUM SERPL-SCNC: 4.7 MMOL/L (ref 3.5–5.1)
SODIUM BLD-SCNC: 143 MMOL/L (ref 136–145)
T4 FREE: 1 NG/DL (ref 0.9–1.8)
TOTAL PROTEIN: 6.3 G/DL (ref 6.4–8.2)
TRIGL SERPL-MCNC: 59 MG/DL (ref 0–150)
TSH SERPL DL<=0.05 MIU/L-ACNC: 2.83 UIU/ML (ref 0.27–4.2)
VLDLC SERPL CALC-MCNC: 12 MG/DL

## 2022-09-23 PROCEDURE — 36415 COLL VENOUS BLD VENIPUNCTURE: CPT | Performed by: FAMILY MEDICINE

## 2022-09-24 LAB
ESTIMATED AVERAGE GLUCOSE: 125.5 MG/DL
HBA1C MFR BLD: 6 %

## 2022-10-20 ENCOUNTER — OFFICE VISIT (OUTPATIENT)
Dept: FAMILY MEDICINE CLINIC | Age: 62
End: 2022-10-20
Payer: COMMERCIAL

## 2022-10-20 VITALS
WEIGHT: 182 LBS | TEMPERATURE: 98.3 F | BODY MASS INDEX: 32.25 KG/M2 | SYSTOLIC BLOOD PRESSURE: 146 MMHG | HEIGHT: 63 IN | OXYGEN SATURATION: 98 % | RESPIRATION RATE: 20 BRPM | DIASTOLIC BLOOD PRESSURE: 78 MMHG | HEART RATE: 56 BPM

## 2022-10-20 DIAGNOSIS — G89.29 CHRONIC PAIN OF RIGHT KNEE: ICD-10-CM

## 2022-10-20 DIAGNOSIS — F32.4 MAJOR DEPRESSIVE DISORDER IN PARTIAL REMISSION, UNSPECIFIED WHETHER RECURRENT (HCC): ICD-10-CM

## 2022-10-20 DIAGNOSIS — I10 ESSENTIAL HYPERTENSION: ICD-10-CM

## 2022-10-20 DIAGNOSIS — Z23 NEED FOR INFLUENZA VACCINATION: ICD-10-CM

## 2022-10-20 DIAGNOSIS — F41.1 GENERALIZED ANXIETY DISORDER: ICD-10-CM

## 2022-10-20 DIAGNOSIS — F41.9 ANXIETY: Primary | ICD-10-CM

## 2022-10-20 DIAGNOSIS — E78.00 PURE HYPERCHOLESTEROLEMIA: ICD-10-CM

## 2022-10-20 DIAGNOSIS — N28.9 MILD RENAL INSUFFICIENCY: ICD-10-CM

## 2022-10-20 DIAGNOSIS — M25.561 CHRONIC PAIN OF RIGHT KNEE: ICD-10-CM

## 2022-10-20 DIAGNOSIS — M79.605 PAIN IN BOTH LOWER EXTREMITIES: ICD-10-CM

## 2022-10-20 DIAGNOSIS — M79.604 PAIN IN BOTH LOWER EXTREMITIES: ICD-10-CM

## 2022-10-20 LAB
ALCOHOL URINE: NORMAL
AMPHETAMINE SCREEN, URINE: NORMAL
BARBITURATE SCREEN, URINE: NORMAL
BENZODIAZEPINE SCREEN, URINE: NORMAL
BILIRUBIN, POC: ABNORMAL
BLOOD URINE, POC: ABNORMAL
BUPRENORPHINE URINE: NORMAL
CLARITY, POC: ABNORMAL
COCAINE METABOLITE SCREEN URINE: NORMAL
COLOR, POC: YELLOW
FENTANYL SCREEN, URINE: NORMAL
GABAPENTIN SCREEN, URINE: NORMAL
GLUCOSE URINE, POC: ABNORMAL
KETONES, POC: ABNORMAL
LEUKOCYTE EST, POC: ABNORMAL
MDMA URINE: NORMAL
METHADONE SCREEN, URINE: NORMAL
METHAMPHETAMINE, URINE: NORMAL
NITRITE, POC: ABNORMAL
OPIATE SCREEN URINE: NORMAL
OXYCODONE SCREEN URINE: NORMAL
PH, POC: 6.5
PHENCYCLIDINE SCREEN URINE: NORMAL
PROPOXYPHENE SCREEN, URINE: NORMAL
PROTEIN, POC: ABNORMAL
SPECIFIC GRAVITY, POC: >1.03
SYNTHETIC CANNABINOIDS(K2) SCREEN, URINE: NORMAL
THC SCREEN, URINE: NORMAL
TRAMADOL SCREEN URINE: NORMAL
TRICYCLIC ANTIDEPRESSANTS, UR: NORMAL
UROBILINOGEN, POC: 0.2

## 2022-10-20 PROCEDURE — 90471 IMMUNIZATION ADMIN: CPT | Performed by: FAMILY MEDICINE

## 2022-10-20 PROCEDURE — 80305 DRUG TEST PRSMV DIR OPT OBS: CPT | Performed by: FAMILY MEDICINE

## 2022-10-20 PROCEDURE — 90674 CCIIV4 VAC NO PRSV 0.5 ML IM: CPT | Performed by: FAMILY MEDICINE

## 2022-10-20 PROCEDURE — 99214 OFFICE O/P EST MOD 30 MIN: CPT | Performed by: FAMILY MEDICINE

## 2022-10-20 PROCEDURE — 81002 URINALYSIS NONAUTO W/O SCOPE: CPT | Performed by: FAMILY MEDICINE

## 2022-10-20 RX ORDER — DESVENLAFAXINE 100 MG/1
100 TABLET, EXTENDED RELEASE ORAL NIGHTLY
Qty: 90 TABLET | Refills: 3 | Status: SHIPPED | OUTPATIENT
Start: 2022-10-20

## 2022-10-20 RX ORDER — ALPRAZOLAM 1 MG/1
TABLET ORAL
COMMUNITY
Start: 2022-09-24 | End: 2022-10-20 | Stop reason: SDUPTHER

## 2022-10-20 RX ORDER — ALPRAZOLAM 1 MG/1
.5-1 TABLET ORAL DAILY PRN
Qty: 40 TABLET | Refills: 2 | Status: SHIPPED | OUTPATIENT
Start: 2022-10-24 | End: 2022-11-23

## 2022-10-20 RX ORDER — HYDROCODONE BITARTRATE AND ACETAMINOPHEN 7.5; 325 MG/1; MG/1
.5-1 TABLET ORAL EVERY 8 HOURS PRN
Qty: 35 TABLET | Refills: 0 | Status: SHIPPED | OUTPATIENT
Start: 2022-11-25 | End: 2022-12-25

## 2022-10-20 RX ORDER — HYDROCODONE BITARTRATE AND ACETAMINOPHEN 7.5; 325 MG/1; MG/1
1 TABLET ORAL 2 TIMES DAILY PRN
Qty: 35 TABLET | Refills: 0 | Status: SHIPPED | OUTPATIENT
Start: 2022-12-25 | End: 2023-01-24

## 2022-10-20 RX ORDER — HYDROCODONE BITARTRATE AND ACETAMINOPHEN 7.5; 325 MG/1; MG/1
.5-1 TABLET ORAL EVERY 8 HOURS PRN
Qty: 35 TABLET | Refills: 0 | Status: SHIPPED | OUTPATIENT
Start: 2022-10-26 | End: 2022-11-25

## 2022-10-20 NOTE — LETTER
CONTROLLED SUBSTANCE MEDICATION AGREEMENT     Patient Name: Colleen Dennison  Patient YOB: 1960   I understand, that controlled substance medications may be used to help better manage my symptoms and to improve my ability to function at home, work and in social settings. However, I also understand that these medications do have risks, which have been discussed with me, including possible development of physical or psychological dependence. I understand that successful treatment requires mutual trust and honesty between me and my provider. I understand and agree that following this Medication Agreement is necessary in continuing my provider-patient relationship and the success of my treatment plan. Explanation from my Provider: Benefits and Goals of Controlled Substance Medications: There are two potential goals for your treatment: (1) decreased pain and suffering (2) improved daily life functions. There are many possible treatments for your chronic condition(s). Alternatives such as physical therapy, yoga, massage, home daily exercise, meditation, relaxation techniques, injections, chiropractic manipulations, surgery, cognitive therapy, hypnosis and many medications that are not habit-forming may be used. Use of controlled substance medications may be helpful, but they are unlikely to resolve all symptoms or restore all function. Explanation from my Provider: Risks of Controlled Substance Medications:  Opioid pain medications: These medications can lead to problems such as addiction/dependence, sedation, lightheadedness/dizziness, memory issues, falls, constipation, nausea, or vomiting. They may also impair the ability to drive or operate machinery. Additionally, these medications may lower testosterone levels, leading to loss of bone strength, stamina and sex drive.   They may cause problems with breathing, sleep apnea and reduced coughing, which is especially dangerous for patients with lung disease. Overdose or dangerous interactions with alcohol and other medications may occur, leading to death. Hyperalgesia may develop, which means patients receiving opioids for the treatment of pain may become more sensitive to certain painful stimuli, and in some cases, experience pain from ordinarily non-painful stimuli. Women between the ages of 14-53 who could become pregnant should carefully weigh the risks and benefits of opioids with their physicians, as these medications increase the risk of pregnancy complications, including miscarriage,  delivery and stillbirth. It is also possible for babies to be born addicted to opioids. Opioid dependence withdrawal symptoms may include; feelings of uneasiness, increased pain, irritability, belly pain, diarrhea, sweats and goose-flesh. Benzodiazepines and non-benzodiazepine sleep medications: These medications can lead to problems such as addiction/dependence, sedation, fatigue, lightheadedness, dizziness, incoordination, falls, depression, hallucinations, and impaired judgment, memory and concentration. The ability to drive and operate machinery may also be affected. Abnormal sleep-related behaviors have been reported, including sleepwalking, driving, making telephone calls, eating, or having sex while not fully awake. These medications can suppress breathing and worsen sleep apnea, particularly when combined with alcohol or other sedating medications, potentially leading to death. Dependence withdrawal symptoms may include tremors, anxiety, hallucinations and seizures. Stimulants:  Common adverse effects include addiction/dependence, increased blood  pressure and heart rate, decreased appetite, nausea, involuntary weight loss, insomnia,                                                                                                                     Initials:_______   irritability, and headaches.   These risks may increase when these medications are combined with other stimulants, such as caffeine pills or energy drinks, certain weight loss supplements and oral decongestants. Dependence withdrawal symptoms may include depressed mood, loss of interest, suicidal thoughts, anxiety, fatigue, appetite changes and agitation. Testosterone replacement therapy:  Potential side effects include increased risk of stroke and heart attack, blood clots, increased blood pressure, increased cholesterol, enlarged prostate, sleep apnea, irritability/aggression and other mood disorders, and decreased fertility. I agree and understand that I and my prescriber have the following rights and responsibilities regarding my treatment plan:     1. MY RIGHTS:  To be informed of my treatment and medication plan. To be an active participant in my health and wellbeing. 2. MY RESPONSIBILITY AND UNDERSTANDING FOR USE OF MEDICATIONS   I will take medications at the dose and frequency as directed. For my safety, I will not increase or change how I take my medications without the recommendation of my healthcare provider.  I will actively participate in any program recommended by my provider which may improve function, including social, physical, psychological programs.  I will not take my medications with alcohol or other drugs not prescribed to me. I understand that drinking alcohol with my medications increases the chances of side effects, including reduced breathing rate and could lead to personal injury when operating machinery.  I understand that if I have a history of substance use disorders, including alcohol or other illicit drugs, that I may be at increased risk of addiction to my medications.  I agree to notify my provider immediately if I should become pregnant so that my treatment plan can be adjusted.    I agree and understand that I shall only receive controlled substance medications from the prescriber that signed this agreement unless there is written agreement among other prescribers of controlled substances outlining the responsibility of the medications being prescribed.  I understand that the if the controlled medication is not helping to achieve goals, the dosage may be tapered and no longer prescribed. 3. MY RESPONSIBILITY FOR COMMUNICATION / PRESCRIPTION RENEWALS   I agree that all controlled substance medications that I take will be prescribed only by my provider. If another healthcare provider prescribes me medication in an emergency, I will notify my provider within seventy-two (72) hours.  I will arrange for refills at the prescribed interval ONLY during regular office hours. I will not ask for refills earlier than agreed, after-hours, on holidays or weekends. Refills may take up to 72 hours for processing and prescriptions to reach the pharmacy.  I will inform my other health care providers that I am taking these medications and of the existence of this [id number]. In the event of an emergency, I will provide the same information to the emergency department prescribers.  I will keep my provider updated on the pharmacy I am using for controlled medication prescription filling. Initials:_______  4. MY RESPONSIBILITY FOR PROTECTING MEDICATIONS   I will protect my prescriptions and medications. I understand that lost or misplaced prescriptions will not be replaced.  I will keep medications only for my own use and will not share them with others. I will keep all medications away from children.  I agree that if my medications are adjusted or discontinued, I will properly dispose of any remaining medications. I understand that I will be required to dispose of any remaining controlled medications as, directed by my prescriber, prior to being provided with any prescriptions for other controlled medications.   Medication drop box locations can be found at: [email or web address]    5. MY RESPONSIBILITY WITH ILLEGAL DRUGS    I will not use illegal or street drugs or another person's prescription medications not prescribed to me.  If there are identified addiction type symptoms, then referral to a program may be provided by my provider and I agree to follow through with this recommendation. 6. MY RESPONSIBILITY FOR COOPERATION WITH INVESTIGATIONS   I understand that my provider will comply with any applicable law and may discuss my use and/or possible misuse/abuse of controlled substances and alcohol, as appropriate, with any health care provider involved in my care, pharmacist, or legal authority.  I authorize my provider and pharmacy to cooperate fully with law enforcement agencies (as permitted by law) in the investigation of any possible misuse, sale, or other diversion of my controlled substances.  I agree to waive any applicable privilege or right of privacy or confidentiality with respect to these authorizations. 7. PROVIDERS RIGHT TO MONITOR FOR SAFETY: PRESCRIPTION MONITORING / DRUG TESTING   I consent to drug/toxicology screening and will submit to a drug screen upon my providers request to assure I am only taking the prescribed drugs for my safety monitoring. I understand that a drug screen is a laboratory test in which a sample of my urine, blood or saliva is checked to see what drugs I have been taking. This may entail an observed urine specimen, which means that a nurse or other health care provider may watch me provide urine, and I will cooperate if I am asked to provide an observed specimen.  I understand that my provider will check a copy of my State Prescription Monitoring Program () Report in order to safely prescribe medications.  Pill Counts: I consent to pill counts when requested.   I may be asked to bring all my prescribed controlled substance medications, in their original bottles, to all of my scheduled appointments. In addition, my provider may ask me to come to the practice at any time for a random pill count. 8. TERMINATION OF THIS AGREEMENT  For my safety, my prescriber has the right to stop prescribing controlled substance medications and may end this agreement. Initials:_______   Conditions that may result in termination of this agreement:  a. I do not show any improvement in pain, or my activity has not improved. b. I develop rapid tolerance or loss of improvement, as described in my treatment plan.  c. I develop significant side effects from the medication. d. My behavior is not consistent with the responsibilities outlined above, thereby causing safety concerns to continue prescribing controlled substance medications. e. I fail to follow the terms of this agreement. f. Other:____________________________       UNDERSTANDING THIS MEDICATION AGREEMENT:    I have read the above and have had all my questions answered. For chronic disease management, I know that my symptoms can be managed with many types of treatments. A chronic medication trial may be part of my treatment, but I must be an active participant in my care. Medication therapy is only one part of my symptom management plan. In some cases, there may be limited scientific evidence to support the chronic use of certain medications to improve symptoms and daily function. Furthermore, in certain circumstances, there may be scientific information that suggests that the use of chronic controlled substances may worsen my symptoms and increase my risk of unintentional death directly related to this medication therapy. I know that if my provider feels my risk from controlled medications is greater than my benefit, I will have my controlled substance medication(s) compassionately lowered or removed altogether.      I further agree to allow this office to contact my HIPAA contact if there are concerns about my safety and use of the controlled medications. I have agreed to use the prescribed controlled substance medications to me as instructed by my provider and as stated in this Medication Agreement. My initial on each page and my signature below shows that I have read each page and I have had the opportunity to ask questions with answers provided by my provider.     Patient Name (Printed): _____________________________________  Patient Signature:  ______________________   Date: _____________    Prescriber Name (Printed): ___________________________________  Prescriber Signature: _____________________  Date: _____________

## 2022-10-20 NOTE — PROGRESS NOTES
[facility] Medicine  Clinic Note    Date: 10/20/2022                                               Subjective:     Chief Complaint   Patient presents with    Follow-up     BP, refills,    Other     Flu shot     HPI  Toprol xl 25 bid -off hyzaar  Using xanax 1 daily in am - missed it one day and felt anxious - like drank 10 cups of coffee. No panic attack. Taking 35 norco/ month - uses mostly during week when on feet a lot - usually takes in evening  Drinking some fluids - not a lot  No urinary changes  No cp/palp/ sob  No swelling in feet / ankles.   One cup decaf in am - o/w no caffeine    BP Readings from Last 3 Encounters:   10/20/22 (!) 146/78   07/26/22 120/72   05/20/22 120/72     Pulse Readings from Last 3 Encounters:   10/20/22 56   05/20/22 52   04/29/22 56       Wt Readings from Last 3 Encounters:   10/20/22 182 lb (82.6 kg)   07/26/22 181 lb (82.1 kg)   05/20/22 190 lb (86.2 kg)            Patient Active Problem List    Diagnosis Date Noted    Generalized anxiety disorder 03/07/2022    Prediabetes 11/23/2021    Vitamin D insufficiency 10/31/2017    CTS (carpal tunnel syndrome) 10/02/2013    Lumbosacral strain     Urinary frequency     Phlebitis and thrombophlebitis of superficial vessels of lower extremities     Leg pain     Peritonsillar abscess     Insomnia     Palpitations     Other specified abnormal findings of blood chemistry     Other and unspecified ovarian cyst     Depression     Plantar fasciitis     Encounter for long-term (current) use of other medications     Pure hypercholesterolemia      Past Medical History:   Diagnosis Date    ADD (attention deficit disorder)     Cervical dysplasia     Chronic leg pain     Depression     Diabetes mellitus, type 2 9/16/2013    Fibrocystic breast     Genital herpes 1994    Hemorrhoid     Hyperlipidemia     Metabolic syndrome     Mood disorder ([address]     MRSA cellulitis 2007    facial    Obesity     Panic attacks     Plantar fasciitis     Recurrent UTI Urethral stenosis     Urinary incontinence, mixed      Past Surgical History:   Procedure Laterality Date    CARPAL TUNNEL RELEASE Right 9/26/13    CHOLECYSTECTOMY      HYSTERECTOMY, TOTAL ABDOMINAL (CERVIX REMOVED)  10/2006    LABIAL ADHESION LYSIS  10/2006    OTHER SURGICAL HISTORY      No problems with anesthesia, no problems with healing, no problems with blood clots after surgery. OTHER SURGICAL HISTORY  5/95    lipoma removal under arm    PLANTAR FASCIA SURGERY  October 2009    MRSA after surgery. Nurse Only on 09/23/2022   Component Date Value Ref Range Status    Magnesium 09/23/2022 2.30  1.80 - 2.40 mg/dL Final    Sodium 09/23/2022 143  136 - 145 mmol/L Final    Potassium 09/23/2022 4.7  3.5 - 5.1 mmol/L Final    Chloride 09/23/2022 105  99 - 110 mmol/L Final    CO2 09/23/2022 27  21 - 32 mmol/L Final    Anion Gap 09/23/2022 11  3 - 16 Final    Glucose 09/23/2022 108 (A)  70 - 99 mg/dL Final    BUN 09/23/2022 15  7 - 20 mg/dL Final    Creatinine 09/23/2022 1.0  0.6 - 1.2 mg/dL Final    GFR Non- 09/23/2022 56 (A)  >60 Final    Comment: >60 mL/min/1.73m2 EGFR, calc. for ages 25 and older using the  MDRD formula (not corrected for weight), is valid for stable  renal function. GFR  09/23/2022 >60  >60 Final    Comment: Chronic Kidney Disease: less than 60 ml/min/1.73 sq.m. Kidney Failure: less than 15 ml/min/1.73 sq.m. Results valid for patients 18 years and older.       Calcium 09/23/2022 9.2  8.3 - 10.6 mg/dL Final    Total Protein 09/23/2022 6.3 (A)  6.4 - 8.2 g/dL Final    Albumin 09/23/2022 4.4  3.4 - 5.0 g/dL Final    Albumin/Globulin Ratio 09/23/2022 2.3 (A)  1.1 - 2.2 Final    Total Bilirubin 09/23/2022 0.3  0.0 - 1.0 mg/dL Final    Alkaline Phosphatase 09/23/2022 62  40 - 129 U/L Final    ALT 09/23/2022 10  10 - 40 U/L Final    AST 09/23/2022 14 (A)  15 - 37 U/L Final    Cholesterol, Total 09/23/2022 159  0 - 199 mg/dL Final    Triglycerides 09/23/2022 59  0 - 150 mg/dL Final    HDL 09/23/2022 53  40 - 60 mg/dL Final    Comment: An HDL cholesterol less than 40 mg/dL is low and  constitutes a coronary heart disease risk factor. An HDL cholesterol greater than 60 mg/dL is a  negative risk factor for coronary heart disease. LDL Calculated 09/23/2022 94  <100 mg/dL Final    VLDL Cholesterol Calculated 09/23/2022 12  Not Established mg/dL Final    Hemoglobin A1C 09/23/2022 6.0  See comment % Final    Comment: Comment:  Diagnosis of Diabetes: > or = 6.5%  Increased risk of diabetes (Prediabetes): 5.7-6.4%  Glycemic Control: Nonpregnant Adults: <7.0%                    Pregnant: <6.0%        eAG 09/23/2022 125.5  mg/dL Final    TSH 09/23/2022 2.83  0.27 - 4.20 uIU/mL Final    T4 Free 09/23/2022 1.0  0.9 - 1.8 ng/dL Final     Family History   Problem Relation Age of Onset    Diabetes Mother     Drug Abuse Mother     Psoriasis Mother     Asthma Mother     COPD Mother     Other Mother         shingles; cervical dysplasia    Heart Disease Father     Diabetes Father     Other Father         kidney stones    Asthma Maternal Aunt     Diabetes Maternal Aunt     Heart Disease Maternal Aunt     Other Maternal Aunt         hep B/cirrhosis    Diabetes Other     Other Sister         1958 gallstones    Diabetes Sister     Cancer Sister         1958 hyst d/t Ca    Scoliosis Son     Other Son         hx of Cat scratch disease?     Heart Disease Son         cardiomyopathy    Asthma Son      Current Outpatient Medications   Medication Sig Dispense Refill    ALPRAZolam (XANAX) 1 MG tablet       metoprolol succinate (TOPROL XL) 25 MG extended release tablet TAKE ONE TABLET BY MOUTH EVERY MORNING AND TAKE ONE TO TWO TABLETS BY MOUTH EVERY EVENING AS DIRECTED 90 tablet 2    desvenlafaxine succinate (PRISTIQ) 100 MG TB24 extended release tablet Take 1 tablet by mouth nightly 90 tablet 3    HYDROcodone-acetaminophen (NORCO) 7.5-325 MG per tablet Take 0.5-1 tablets by mouth every 8 hours as needed for Pain for up to 30 days. Fill 9/26/22 35 tablet 0    folic acid (FOLVITE) 1 MG tablet TAKE ONE TABLET BY MOUTH DAILY 30 tablet 2    L-Methylfolate 15 MG TABS 1 po daily 30 tablet 5    losartan-hydroCHLOROthiazide (HYZAAR) 100-25 MG per tablet TAKE .5 TABLET BY MOUTH DAILY 90 tablet 3    estradiol (ESTRACE VAGINAL) 0.1 MG/GM vaginal cream Place 1 g vaginally daily For 2 weeks then twice weekly 1 each 11    atorvastatin (LIPITOR) 40 MG tablet 1 po daily (Patient taking differently: Take 20 mg by mouth daily) 90 tablet 3     No current facility-administered medications for this visit. Allergies   Allergen Reactions    Sulfa Antibiotics        Review of Systems    Objective:  BP (!) 146/78 (Site: Left Upper Arm, Position: Sitting, Cuff Size: Medium Adult)   Pulse 56   Temp 98.3 °F (36.8 °C) (Temporal)   Resp 20   Ht 5' 3\" (1.6 m)   Wt 182 lb (82.6 kg) Comment: verbal  SpO2 98%   BMI 32.24 kg/m²     BP Readings from Last 3 Encounters:   10/20/22 (!) 146/78   07/26/22 120/72   05/20/22 120/72       Pulse Readings from Last 3 Encounters:   10/20/22 56   05/20/22 52   04/29/22 56       Wt Readings from Last 3 Encounters:   10/20/22 182 lb (82.6 kg)   07/26/22 181 lb (82.1 kg)   05/20/22 190 lb (86.2 kg)       Physical Exam  Constitutional:       General: She is not in acute distress. Appearance: She is well-developed. HENT:      Head: Normocephalic and atraumatic. Mouth/Throat:      Pharynx: No oropharyngeal exudate. Eyes:      General: No scleral icterus. Conjunctiva/sclera: Conjunctivae normal.   Neck:      Thyroid: No thyromegaly. Cardiovascular:      Rate and Rhythm: Normal rate and regular rhythm. Heart sounds: Normal heart sounds. No murmur heard. Pulmonary:      Effort: Pulmonary effort is normal. No respiratory distress. Breath sounds: Normal breath sounds. No wheezing or rales. Abdominal:      General: Bowel sounds are normal. There is no distension. Palpations: Abdomen is soft. Tenderness: There is no abdominal tenderness. Musculoskeletal:         General: No swelling. Lymphadenopathy:      Cervical: No cervical adenopathy. Skin:     General: Skin is warm and dry. Neurological:      Mental Status: She is alert and oriented to person, place, and time. Assessment/Plan:      Diagnosis Orders   1. Anxiety        2. Essential hypertension        3. Pure hypercholesterolemia        4.  Major depressive disorder in partial remission, unspecified whether recurrent (HCC)        5. Pain in both lower extremities          Flu shot today  Check bmp soon w/ repeat bp -repeat bp in office today 120/72 - hydration encouraged - cont toprol off hyzaar  Recent labs reviewed - gfr 56, glucose 108/ a1c 6.0%  A1c today  Bp reading high today on hyzaar 100/25   oarrs reviewed and results c/w rx'd meds  Norco 7.5 #35 filled 9/26  Xanax 1mg #60 filled 9/24  Check ua/ uds  Med agreement  Reduce benzo to 40 from 60/ month w/ goal to reduce further d/w pt    Slime Cardenas MD, MD  10/20/2022  3:10 PM

## 2022-10-21 LAB — URINE CULTURE, ROUTINE: NORMAL

## 2022-11-07 ENCOUNTER — NURSE ONLY (OUTPATIENT)
Dept: FAMILY MEDICINE CLINIC | Age: 62
End: 2022-11-07
Payer: COMMERCIAL

## 2022-11-07 VITALS — SYSTOLIC BLOOD PRESSURE: 120 MMHG | DIASTOLIC BLOOD PRESSURE: 68 MMHG

## 2022-11-07 DIAGNOSIS — N28.9 MILD RENAL INSUFFICIENCY: ICD-10-CM

## 2022-11-07 PROCEDURE — 36415 COLL VENOUS BLD VENIPUNCTURE: CPT | Performed by: FAMILY MEDICINE

## 2022-11-07 NOTE — PROGRESS NOTES
BLOOD PRESSURE CHECK AND NONFASTING LAB DRAWN RA. [facility]

## 2022-11-08 LAB
ANION GAP SERPL CALCULATED.3IONS-SCNC: 15 MMOL/L (ref 3–16)
BUN BLDV-MCNC: 20 MG/DL (ref 7–20)
CALCIUM SERPL-MCNC: 9.3 MG/DL (ref 8.3–10.6)
CHLORIDE BLD-SCNC: 106 MMOL/L (ref 99–110)
CO2: 24 MMOL/L (ref 21–32)
CREAT SERPL-MCNC: 1 MG/DL (ref 0.6–1.2)
GFR SERPL CREATININE-BSD FRML MDRD: >60 ML/MIN/{1.73_M2}
GLUCOSE BLD-MCNC: 118 MG/DL (ref 70–99)
POTASSIUM SERPL-SCNC: 4 MMOL/L (ref 3.5–5.1)
SODIUM BLD-SCNC: 145 MMOL/L (ref 136–145)

## 2022-11-21 RX ORDER — METOPROLOL SUCCINATE 25 MG/1
TABLET, EXTENDED RELEASE ORAL
Qty: 90 TABLET | Refills: 0 | Status: SHIPPED | OUTPATIENT
Start: 2022-11-21

## 2022-12-21 ENCOUNTER — TELEPHONE (OUTPATIENT)
Dept: FAMILY MEDICINE CLINIC | Age: 62
End: 2022-12-21

## 2022-12-21 RX ORDER — METOPROLOL SUCCINATE 25 MG/1
TABLET, EXTENDED RELEASE ORAL
Qty: 90 TABLET | Refills: 0 | Status: SHIPPED | OUTPATIENT
Start: 2022-12-21

## 2023-01-23 RX ORDER — METOPROLOL SUCCINATE 25 MG/1
TABLET, EXTENDED RELEASE ORAL
Qty: 90 TABLET | Refills: 0 | Status: SHIPPED | OUTPATIENT
Start: 2023-01-23

## 2023-01-24 ENCOUNTER — OFFICE VISIT (OUTPATIENT)
Dept: FAMILY MEDICINE CLINIC | Age: 63
End: 2023-01-24
Payer: COMMERCIAL

## 2023-01-24 VITALS
HEIGHT: 63 IN | DIASTOLIC BLOOD PRESSURE: 74 MMHG | BODY MASS INDEX: 33.84 KG/M2 | WEIGHT: 191 LBS | OXYGEN SATURATION: 98 % | SYSTOLIC BLOOD PRESSURE: 130 MMHG | HEART RATE: 58 BPM

## 2023-01-24 DIAGNOSIS — F41.1 GENERALIZED ANXIETY DISORDER: Primary | ICD-10-CM

## 2023-01-24 DIAGNOSIS — E78.00 PURE HYPERCHOLESTEROLEMIA: ICD-10-CM

## 2023-01-24 DIAGNOSIS — M25.561 CHRONIC PAIN OF RIGHT KNEE: ICD-10-CM

## 2023-01-24 DIAGNOSIS — R73.03 PRE-DIABETES: ICD-10-CM

## 2023-01-24 DIAGNOSIS — M79.604 PAIN IN BOTH LOWER EXTREMITIES: ICD-10-CM

## 2023-01-24 DIAGNOSIS — M79.605 PAIN IN BOTH LOWER EXTREMITIES: ICD-10-CM

## 2023-01-24 DIAGNOSIS — G89.29 CHRONIC PAIN OF RIGHT KNEE: ICD-10-CM

## 2023-01-24 LAB — HBA1C MFR BLD: 5.7 %

## 2023-01-24 PROCEDURE — 99214 OFFICE O/P EST MOD 30 MIN: CPT | Performed by: FAMILY MEDICINE

## 2023-01-24 PROCEDURE — 83036 HEMOGLOBIN GLYCOSYLATED A1C: CPT | Performed by: FAMILY MEDICINE

## 2023-01-24 RX ORDER — HYDROCODONE BITARTRATE AND ACETAMINOPHEN 7.5; 325 MG/1; MG/1
.5-1 TABLET ORAL EVERY 8 HOURS PRN
Qty: 35 TABLET | Refills: 0 | Status: SHIPPED | OUTPATIENT
Start: 2023-02-25 | End: 2023-03-27

## 2023-01-24 RX ORDER — ALPRAZOLAM 1 MG/1
.5-1 TABLET ORAL DAILY PRN
Qty: 30 TABLET | Refills: 0 | Status: SHIPPED | OUTPATIENT
Start: 2023-02-08 | End: 2023-03-10

## 2023-01-24 RX ORDER — CLOBETASOL PROPIONATE 0.5 MG/G
OINTMENT TOPICAL 2 TIMES DAILY
Qty: 30 G | Refills: 0 | Status: SHIPPED | OUTPATIENT
Start: 2023-01-24

## 2023-01-24 RX ORDER — HYDROCODONE BITARTRATE AND ACETAMINOPHEN 7.5; 325 MG/1; MG/1
.5-1 TABLET ORAL EVERY 8 HOURS PRN
Qty: 35 TABLET | Refills: 0 | Status: SHIPPED | OUTPATIENT
Start: 2023-03-27 | End: 2023-04-26

## 2023-01-24 RX ORDER — ALPRAZOLAM 1 MG/1
TABLET ORAL
COMMUNITY
Start: 2022-12-03 | End: 2023-01-24 | Stop reason: SDUPTHER

## 2023-01-24 RX ORDER — CLOBETASOL PROPIONATE 0.5 MG/G
OINTMENT TOPICAL 2 TIMES DAILY
COMMUNITY
End: 2023-01-24 | Stop reason: SDUPTHER

## 2023-01-24 RX ORDER — HYDROCODONE BITARTRATE AND ACETAMINOPHEN 7.5; 325 MG/1; MG/1
1 TABLET ORAL 2 TIMES DAILY PRN
Qty: 35 TABLET | Refills: 0 | Status: SHIPPED | OUTPATIENT
Start: 2023-01-26 | End: 2023-02-25

## 2023-01-24 ASSESSMENT — PATIENT HEALTH QUESTIONNAIRE - PHQ9
SUM OF ALL RESPONSES TO PHQ9 QUESTIONS 1 & 2: 2
SUM OF ALL RESPONSES TO PHQ QUESTIONS 1-9: 2
7. TROUBLE CONCENTRATING ON THINGS, SUCH AS READING THE NEWSPAPER OR WATCHING TELEVISION: 0
3. TROUBLE FALLING OR STAYING ASLEEP: 0
6. FEELING BAD ABOUT YOURSELF - OR THAT YOU ARE A FAILURE OR HAVE LET YOURSELF OR YOUR FAMILY DOWN: 0
9. THOUGHTS THAT YOU WOULD BE BETTER OFF DEAD, OR OF HURTING YOURSELF: 0
4. FEELING TIRED OR HAVING LITTLE ENERGY: 0
2. FEELING DOWN, DEPRESSED OR HOPELESS: 1
5. POOR APPETITE OR OVEREATING: 0
8. MOVING OR SPEAKING SO SLOWLY THAT OTHER PEOPLE COULD HAVE NOTICED. OR THE OPPOSITE, BEING SO FIGETY OR RESTLESS THAT YOU HAVE BEEN MOVING AROUND A LOT MORE THAN USUAL: 0
SUM OF ALL RESPONSES TO PHQ QUESTIONS 1-9: 2
SUM OF ALL RESPONSES TO PHQ QUESTIONS 1-9: 2
1. LITTLE INTEREST OR PLEASURE IN DOING THINGS: 1
SUM OF ALL RESPONSES TO PHQ QUESTIONS 1-9: 2
10. IF YOU CHECKED OFF ANY PROBLEMS, HOW DIFFICULT HAVE THESE PROBLEMS MADE IT FOR YOU TO DO YOUR WORK, TAKE CARE OF THINGS AT HOME, OR GET ALONG WITH OTHER PEOPLE: 0

## 2023-01-24 NOTE — PROGRESS NOTES
[facility]  Clinic Note    Date: 1/24/2023                                               Subjective:     Chief Complaint   Patient presents with    Diabetes     DM ROUTINE FOLLOW UP AIC DUE     Anxiety     ANXIETY ROUTINE FOLLOW UP      HPI      Declines weight check today  Down to #35 norco/ month for chronic leg pain  Reducing benzo dose  Not eating as well but eats no salt peanuts for bp and gets yogurt parfait each am for breakfast - got off diet some over holidays but trying to watch it - up a few pounds  Taking xanax 1/day - takes 1/2 tab on weekend days - trying to take 1/2 tab xanax on more days  High lipids in family - on lipitor   Trying to reduce meds - all meds as much as able to tolerate  Mood generally good on pristiq -anxiety under much better control w/ pristiq and healthier diet/ exercise- feels able to continue to wean down  Decaf coffee       BP Readings from Last 3 Encounters:   01/24/23 130/74   11/07/22 120/68   10/20/22 (!) 146/78     Pulse Readings from Last 3 Encounters:   01/24/23 58   10/20/22 56   05/20/22 52     Not checking bp as often 2/2 anxiety kicking off    Hemoglobin A1C   Date Value Ref Range Status   09/23/2022 6.0 See comment % Final     Comment:     Comment:  Diagnosis of Diabetes: > or = 6.5%  Increased risk of diabetes (Prediabetes): 5.7-6.4%  Glycemic Control: Nonpregnant Adults: <7.0%                    Pregnant: <6.0%                Patient Active Problem List    Diagnosis Date Noted    Generalized anxiety disorder 03/07/2022    Prediabetes 11/23/2021    Vitamin D insufficiency 10/31/2017    CTS (carpal tunnel syndrome) 10/02/2013    Lumbosacral strain     Urinary frequency     Phlebitis and thrombophlebitis of superficial vessels of lower extremities     Leg pain     Peritonsillar abscess     Insomnia     Palpitations     Other specified abnormal findings of blood chemistry     Other and unspecified ovarian cyst     Depression     Plantar fasciitis Encounter for long-term (current) use of other medications     Pure hypercholesterolemia      Past Medical History:   Diagnosis Date    ADD (attention deficit disorder)     Cervical dysplasia     Chronic leg pain     Depression     Diabetes mellitus, type 2 9/16/2013    Fibrocystic breast     Genital herpes 1994    Hemorrhoid     Hyperlipidemia     Metabolic syndrome     Mood disorder ([facility] [address]     MRSA cellulitis 2007    facial    Obesity     Panic attacks     Plantar fasciitis     Recurrent UTI     Urethral stenosis     Urinary incontinence, mixed      Past Surgical History:   Procedure Laterality Date    CARPAL TUNNEL RELEASE Right 9/26/13    CHOLECYSTECTOMY      HYSTERECTOMY, TOTAL ABDOMINAL (CERVIX REMOVED)  10/2006    LABIAL ADHESION LYSIS  10/2006    OTHER SURGICAL HISTORY      No problems with anesthesia, no problems with healing, no problems with blood clots after surgery. OTHER SURGICAL HISTORY  5/95    lipoma removal under arm    PLANTAR FASCIA SURGERY  October 2009    MRSA after surgery. Nurse Only on 11/07/2022   Component Date Value Ref Range Status    Sodium 11/07/2022 145  136 - 145 mmol/L Final    Potassium 11/07/2022 4.0  3.5 - 5.1 mmol/L Final    Chloride 11/07/2022 106  99 - 110 mmol/L Final    CO2 11/07/2022 24  21 - 32 mmol/L Final    Anion Gap 11/07/2022 15  3 - 16 Final    Glucose 11/07/2022 118 (A)  70 - 99 mg/dL Final    BUN 11/07/2022 20  7 - 20 mg/dL Final    Creatinine 11/07/2022 1.0  0.6 - 1.2 mg/dL Final    Est, Glom Filt Rate 11/07/2022 >60  >60 Final    Comment: Pediatric calculator link  [email or web address]/kdoqi/[email or web address]_[email or web address]  Effective Oct 3, 2022  These results are not intended for use in patients  <25years of age. eGFR results are calculated without  a race factor using the 2021 CKD-EPI equation. Careful  clinical correlation is recommended, particularly when  comparing to results calculated using previous equations.   The CKD-EPI equation is less accurate in patients with  extremes of muscle mass, extra-renal metabolism of  creatinine, excessive creatinine ingestion, or following  therapy that affects renal tubular secretion. Calcium 11/07/2022 9.3  8.3 - 10.6 mg/dL Final     Family History   Problem Relation Age of Onset    Diabetes Mother     Drug Abuse Mother     Psoriasis Mother     Asthma Mother     COPD Mother     Other Mother         shingles; cervical dysplasia    Heart Disease Father     Diabetes Father     Other Father         kidney stones    Asthma Maternal Aunt     Diabetes Maternal Aunt     Heart Disease Maternal Aunt     Other Maternal Aunt         hep B/cirrhosis    Diabetes Other     Other Sister         1958 gallstones    Diabetes Sister     Cancer Sister         1958 hyst d/t Ca    Scoliosis Son     Other Son         hx of Cat scratch disease? Heart Disease Son         cardiomyopathy    Asthma Son      Current Outpatient Medications   Medication Sig Dispense Refill    metoprolol succinate (TOPROL XL) 25 MG extended release tablet TAKE ONE TABLET BY MOUTH EVERY MORNING AND TAKE ONE TO TWO TABLETS BY MOUTH EVERY EVENING AS DIRECTED 90 tablet 0    HYDROcodone-acetaminophen (NORCO) 7.5-325 MG per tablet Take 1 tablet by mouth 2 times daily as needed for Pain for up to 30 days. Fill 12/25/22 or after 35 tablet 0    desvenlafaxine succinate (PRISTIQ) 100 MG TB24 extended release tablet Take 1 tablet by mouth nightly 90 tablet 3    folic acid (FOLVITE) 1 MG tablet TAKE ONE TABLET BY MOUTH DAILY 30 tablet 2    L-Methylfolate 15 MG TABS 1 po daily 30 tablet 5    estradiol (ESTRACE VAGINAL) 0.1 MG/GM vaginal cream Place 1 g vaginally daily For 2 weeks then twice weekly 1 each 11    atorvastatin (LIPITOR) 40 MG tablet 1 po daily (Patient taking differently: Take 20 mg by mouth daily) 90 tablet 3     No current facility-administered medications for this visit.      Allergies   Allergen Reactions    Sulfa Antibiotics        Review of Systems    Objective:  Ht 5' 3\" (1.6 m)   BMI 32.24 kg/m²     BP Readings from Last 3 Encounters:   11/07/22 120/68   10/20/22 (!) 146/78   07/26/22 120/72       Pulse Readings from Last 3 Encounters:   10/20/22 56   05/20/22 52   04/29/22 56       Wt Readings from Last 3 Encounters:   10/20/22 182 lb (82.6 kg)   07/26/22 181 lb (82.1 kg)   05/20/22 190 lb (86.2 kg)       Physical Exam  Vitals and nursing note reviewed. Constitutional:       Appearance: She is well-developed. HENT:      Head: Normocephalic and atraumatic. Eyes:      General: No scleral icterus. Conjunctiva/sclera: Conjunctivae normal.   Pulmonary:      Effort: Pulmonary effort is normal.   Musculoskeletal:         General: No swelling. Skin:     General: Skin is warm and dry. Neurological:      Mental Status: She is alert and oriented to person, place, and time. Assessment/Plan:      Diagnosis Orders   1. Generalized anxiety disorder  ALPRAZolam (XANAX) 1 MG tablet      2. Pure hypercholesterolemia        3. Pain in both lower extremities  HYDROcodone-acetaminophen (NORCO) 7.5-325 MG per tablet    HYDROcodone-acetaminophen (NORCO) 7.5-325 MG per tablet      4. Pre-diabetes  POCT glycosylated hemoglobin (Hb A1C)      5. Chronic pain of right knee  HYDROcodone-acetaminophen (NORCO) 7.5-325 MG per tablet    HYDROcodone-acetaminophen (NORCO) 7.5-325 MG per tablet        A1c today is actually improved to 5.7%. Diet and activity were discussed with patient. Continue Lipitor for high cholesterol. Refill clobetasol given to her by gynecology to use sparingly on an as-needed basis for irritation.   Patient did not want to continue estrogen cream  Labs 9/22 and 11/22 reviewed  oarrs reviewed and results c/w rx'd meds  Xanax 1mg #40 filled 1/9 - monthly  Norco 7.5 #35 filed 12/27 - gradually reducing dose as able  Continue Norco daily as needed #35 for 3-month refill with goal to use only when pain is severe  Reduce Xanax 1 mg from 40-month to 30-month with goal of reducing to half tablet daily as tolerated over the next 1 to 2 months. Patient will call for new prescription of half milligram tablet to continue weaning in next 2 months.   Routine follow-up 3 months  Parisa Shahid MD, MD  1/24/2023  4:13 PM

## 2023-02-24 ENCOUNTER — TELEPHONE (OUTPATIENT)
Dept: FAMILY MEDICINE CLINIC | Age: 63
End: 2023-02-24

## 2023-02-24 RX ORDER — METOPROLOL SUCCINATE 25 MG/1
TABLET, EXTENDED RELEASE ORAL
Qty: 90 TABLET | Refills: 0 | Status: SHIPPED | OUTPATIENT
Start: 2023-02-24

## 2023-03-01 ENCOUNTER — TELEPHONE (OUTPATIENT)
Dept: FAMILY MEDICINE CLINIC | Age: 63
End: 2023-03-01

## 2023-03-01 NOTE — TELEPHONE ENCOUNTER
Pt is trying to lose weight and she bought the [employer] commercial product  [employer]. Have you heard of  this and know anything about this? Can she take this with her medications? .  It is plant based and has no caffeine--not approved yet by the FDA. Insulin resistant-      She does not have the bottle yet? She is going to send a picture of the label  in [employer].

## 2023-03-01 NOTE — TELEPHONE ENCOUNTER
Spoke with Franko Zamora - there is no interaction in regards to her medication- I advised her to try intermittent fasting and calorie restrictions 1300- 1400 daily- discussed GOLO with pt as well- can close

## 2023-03-13 ENCOUNTER — OFFICE VISIT (OUTPATIENT)
Dept: FAMILY MEDICINE CLINIC | Age: 63
End: 2023-03-13

## 2023-03-13 VITALS
WEIGHT: 194 LBS | DIASTOLIC BLOOD PRESSURE: 68 MMHG | SYSTOLIC BLOOD PRESSURE: 122 MMHG | HEIGHT: 63 IN | HEART RATE: 67 BPM | BODY MASS INDEX: 34.38 KG/M2 | OXYGEN SATURATION: 98 %

## 2023-03-13 DIAGNOSIS — Z12.11 SCREENING FOR MALIGNANT NEOPLASM OF COLON: ICD-10-CM

## 2023-03-13 DIAGNOSIS — Z12.31 ENCOUNTER FOR SCREENING MAMMOGRAM FOR MALIGNANT NEOPLASM OF BREAST: ICD-10-CM

## 2023-03-13 DIAGNOSIS — R42 DIZZINESS: ICD-10-CM

## 2023-03-13 DIAGNOSIS — H81.10 BPPV (BENIGN PAROXYSMAL POSITIONAL VERTIGO), UNSPECIFIED LATERALITY: Primary | ICD-10-CM

## 2023-03-13 RX ORDER — MECLIZINE HCL 12.5 MG/1
12.5-25 TABLET ORAL 3 TIMES DAILY PRN
Qty: 30 TABLET | Refills: 0 | Status: SHIPPED | OUTPATIENT
Start: 2023-03-13 | End: 2023-03-23

## 2023-03-13 SDOH — ECONOMIC STABILITY: FOOD INSECURITY: WITHIN THE PAST 12 MONTHS, THE FOOD YOU BOUGHT JUST DIDN'T LAST AND YOU DIDN'T HAVE MONEY TO GET MORE.: NEVER TRUE

## 2023-03-13 SDOH — ECONOMIC STABILITY: INCOME INSECURITY: HOW HARD IS IT FOR YOU TO PAY FOR THE VERY BASICS LIKE FOOD, HOUSING, MEDICAL CARE, AND HEATING?: NOT HARD AT ALL

## 2023-03-13 SDOH — ECONOMIC STABILITY: HOUSING INSECURITY
IN THE LAST 12 MONTHS, WAS THERE A TIME WHEN YOU DID NOT HAVE A STEADY PLACE TO SLEEP OR SLEPT IN A SHELTER (INCLUDING NOW)?: NO

## 2023-03-13 SDOH — ECONOMIC STABILITY: FOOD INSECURITY: WITHIN THE PAST 12 MONTHS, YOU WORRIED THAT YOUR FOOD WOULD RUN OUT BEFORE YOU GOT MONEY TO BUY MORE.: NEVER TRUE

## 2023-03-13 ASSESSMENT — ENCOUNTER SYMPTOMS
ABDOMINAL PAIN: 0
SINUS PAIN: 0
CONSTIPATION: 0
DIARRHEA: 0
EYE DISCHARGE: 0
CHEST TIGHTNESS: 0
SINUS PRESSURE: 0
NAUSEA: 0
COLOR CHANGE: 0
SHORTNESS OF BREATH: 0
BACK PAIN: 0
ABDOMINAL DISTENTION: 0
COUGH: 0

## 2023-03-13 NOTE — PROGRESS NOTES
Date of Service:  3/13/2023    Kirsten Gant (:  1960) is a 58 y.o. female, here for evaluation of the following medical concerns:    Chief Complaint   Patient presents with    Dizziness     PT C/O DIZZINESS X 4 DAYS         HPI    Dizziness  Patient reports dizziness over the past 4-5 days. Only occurs when gets up in middle of night from laying/reclined position. Worst from supine position. Orthostatic VS performed by MA in office- MA says BP lower when laying down, markus with standing which discussed with pt is normal. Patient says she has been watching what she is eating. Worked all day today and felt fine. Seems to happen mostly at night. Seems to happen with drastic position changes, held head up for adjusting her locker today and with position change to neutral she felt it. Dizziness lasts for a few minutes but pt says it feels like forever. When getting up from recliner, does not feel as bad from getting up out of bed. Pt remembers this happened years ago and it lasted just a couple days and then quit. Pt has not tried any allergy medication. Some vertigo. Takes metoprolol for anxiety. Patient weaning down on xanax, she is proud of this. Review of Systems   Constitutional:  Negative for activity change, appetite change, fatigue, fever and unexpected weight change. HENT:  Negative for congestion, ear pain, sinus pressure and sinus pain. Eyes:  Negative for discharge and visual disturbance. Respiratory:  Negative for cough, chest tightness and shortness of breath. Cardiovascular:  Negative for chest pain, palpitations and leg swelling. Gastrointestinal:  Negative for abdominal distention, abdominal pain, constipation, diarrhea and nausea. Endocrine: Negative for cold intolerance, heat intolerance, polydipsia, polyphagia and polyuria. Genitourinary:  Negative for decreased urine volume, difficulty urinating, dysuria, flank pain, frequency and urgency.    Musculoskeletal:  Negative for arthralgias, back pain, gait problem, joint swelling, myalgias and neck pain. Skin:  Negative for color change, rash and wound. Allergic/Immunologic: Negative for food allergies and immunocompromised state. Neurological:  Positive for dizziness. Negative for tremors, speech difficulty, weakness, light-headedness, numbness and headaches. Hematological:  Negative for adenopathy. Does not bruise/bleed easily. Psychiatric/Behavioral:  Negative for confusion, decreased concentration, self-injury, sleep disturbance and suicidal ideas. The patient is not nervous/anxious. Prior to Visit Medications    Medication Sig Taking? Authorizing Provider   meclizine (ANTIVERT) 12.5 MG tablet Take 1-2 tablets by mouth 3 times daily as needed for Dizziness Yes PHONG Silva CNP   metoprolol succinate (TOPROL XL) 25 MG extended release tablet TAKE ONE TABLET BY MOUTH EVERY MORNING AND TAKE ONE TO TWO TABLETS BY MOUTH EVERY EVENING AS DIRECTED Yes Ramirez Estrada MD   clobetasol (TEMOVATE) 0.05 % ointment Apply topically 2 times daily Apply topically 2 times daily. Yes Ramirez Estrada MD   HYDROcodone-acetaminophen [facility]) 7.5-325 MG per tablet Take 0.5-1 tablets by mouth every 8 hours as needed for Pain for up to 30 days. Fill 2/25/23 or after Yes Ramirez Estrada MD   HYDROcodone-acetaminophen [facility]) 7.5-325 MG per tablet Take 0.5-1 tablets by mouth every 8 hours as needed for Pain for up to 30 days.  Fill on or after 3/27/23 Yes Ramirez Estrada MD   desvenlafaxine succinate (PRISTIQ) 100 MG TB24 extended release tablet Take 1 tablet by mouth nightly Yes Ramirez Estrada MD   folic acid (FOLVITE) 1 MG tablet TAKE ONE TABLET BY MOUTH DAILY Yes PHONG Aleman CNP   L-Methylfolate 15 MG TABS 1 po daily Yes Ramirez Estrada MD   estradiol (ESTRACE VAGINAL) 0.1 MG/GM vaginal cream Place 1 g vaginally daily For 2 weeks then twice weekly Yes Kumar You MD   atorvastatin (LIPITOR) 40 MG tablet 1 po daily  Patient taking differently: Take 20 mg by mouth daily Yes Cari Dhillon MD        Social History     Tobacco Use    Smoking status: Former     Packs/day: 1.00     Years: 30.00     Pack years: 30.00     Types: Cigarettes     Start date: 10/30/1974     Quit date: 10/30/2004     Years since quittin.3    Smokeless tobacco: Never    Tobacco comments:     Year started: 65 Year Quit: ; Educated not to start back. Substance Use Topics    Alcohol use: No     Comment: occasionally         Vitals:    23 1619 23 1640 23 1642   BP: 130/70 118/70 122/68   Site: Right Upper Arm Left Upper Arm Left Upper Arm   Position: Sitting Supine Sitting   Cuff Size: Large Adult Large Adult Large Adult   Pulse: 69 64 67   SpO2: 98%     Weight: 194 lb (88 kg)     Height: 5' 3\" (1.6 m)       Estimated body mass index is 34.37 kg/m² as calculated from the following:    Height as of this encounter: 5' 3\" (1.6 m). Weight as of this encounter: 194 lb (88 kg). Physical Exam  Vitals reviewed. Constitutional:       General: She is awake. Appearance: Normal appearance. She is well-developed and well-groomed. She is obese. She is not ill-appearing. HENT:      Head: Normocephalic and atraumatic. Right Ear: Hearing, tympanic membrane, ear canal and external ear normal.      Left Ear: Hearing, tympanic membrane, ear canal and external ear normal.      Nose: Nose normal.      Mouth/Throat:      Lips: Pink. Mouth: Mucous membranes are moist.      Pharynx: Oropharynx is clear. Eyes:      General: Lids are normal.      Extraocular Movements: Extraocular movements intact. Conjunctiva/sclera: Conjunctivae normal.      Pupils: Pupils are equal, round, and reactive to light. Neck:      Thyroid: No thyromegaly. Vascular: No carotid bruit. Cardiovascular:      Rate and Rhythm: Normal rate. Pulses:           Carotid pulses are 2+ on the right side and 2+ on the left side.        Radial pulses are 2+ on the right side and 2+ on the left side. Posterior tibial pulses are 2+ on the right side and 2+ on the left side. Heart sounds: Normal heart sounds, S1 normal and S2 normal. No murmur heard. Pulmonary:      Effort: Pulmonary effort is normal.      Breath sounds: Normal breath sounds. Abdominal:      General: Bowel sounds are normal. There is no abdominal bruit. Palpations: Abdomen is soft. Tenderness: There is no abdominal tenderness. Genitourinary:     Comments: Deferred  Musculoskeletal:         General: Normal range of motion. Cervical back: Full passive range of motion without pain, normal range of motion and neck supple. Right lower leg: No edema. Left lower leg: No edema. Lymphadenopathy:      Head:      Right side of head: No submental, submandibular, tonsillar, preauricular, posterior auricular or occipital adenopathy. Left side of head: No submental, submandibular, tonsillar, preauricular, posterior auricular or occipital adenopathy. Cervical: No cervical adenopathy. Right cervical: No superficial, deep or posterior cervical adenopathy. Left cervical: No superficial, deep or posterior cervical adenopathy. Upper Body:      Right upper body: No supraclavicular adenopathy. Left upper body: No supraclavicular adenopathy. Skin:     General: Skin is warm and dry. Capillary Refill: Capillary refill takes less than 2 seconds. Neurological:      General: No focal deficit present. Mental Status: She is alert and oriented to person, place, and time. Mental status is at baseline. Sensory: Sensation is intact. Motor: Motor function is intact. Coordination: Coordination normal. Finger-Nose-Finger Test normal. Rapid alternating movements normal.      Gait: Gait is intact.    Psychiatric:         Attention and Perception: Attention and perception normal.         Mood and Affect: Mood and affect normal. Speech: Speech normal.         Behavior: Behavior normal. Behavior is cooperative. Thought Content: Thought content normal.         Cognition and Memory: Cognition and memory normal.         Judgment: Judgment normal.       ASSESSMENT/PLAN:  1. BPPV (benign paroxysmal positional vertigo), unspecified laterality  -     meclizine (ANTIVERT) 12.5 MG tablet; Take 1-2 tablets by mouth 3 times daily as needed for Dizziness, Disp-30 tablet, R-0Normal   Discussed with patient, discussed zyrtec or claritin daily, flonase nasal spray daily, meclizine PRN, and then given cawthorne and epley exercises given   Likely due to weather changes but if persists, would need ENT/neuro referral  2. Dizziness  -     meclizine (ANTIVERT) 12.5 MG tablet; Take 1-2 tablets by mouth 3 times daily as needed for Dizziness, Disp-30 tablet, R-0Normal   Discussed with pt, recreates with laying supine and then turning head left to right and then going supine to sitting   Discussed with pt less concerning when can recreate the dizziness   Information printed and reviewed  3. Screening for malignant neoplasm of colon  -     Fecal DNA Colorectal cancer screening (Cologuard)   Overdue, FIT test 2021  4. Encounter for screening mammogram for malignant neoplasm of breast  -     RACHNA DIGITAL SCREEN W OR WO CAD BILATERAL;  Future    Please call [address] [place] [address] to schedule your mammogram or [phone] for mammogram Sirena Mccullough   Information printed and reviewed   Discussed with pt, overdue    Care Gaps Addressed  Annual eye exam recommended for diabetic retinal exam, please ask eye doctor to fax us the report  [facility] [phone]  Colon cancer screening discussed- says she comes in April 2023, wants to discuss then  [facility] April 2021- aware she is due    Today I spent 33 minutes preparing to see this patient, reviewing history and tests, face to face time with patient and performing a thorough physical exam, and educating patient(family) on diagnosis, plan of care, and treatment plan. I have reviewed patient's pertinent medical history, relevant laboratory and imaging studies, and past/future health maintenance. Discussed with the patient the importance of adhering to their current medication regimen as directed. Advised the patient that they should continue to work on eating a healthy balanced diet and staying active by exercising within their personal limits. Orders as listed above. Patient was advised to keep future appointments with their respective specialty care team(s). Patient had the opportunity to ask questions, all of which were answered to the best of my ability and with patient satisfaction. Patient understands and is agreeable with the care plan following today's visit. Patient is to schedule an appointment for any new or worsening symptoms. Go to ER for significant shortness of breath, chest pain, or uncontrolled pain or fever. I discussed with patient the risk and benefits of any medications that were prescribed today. I verified that the patient understands their medications, labs, and/or procedures. The patient is doing well with current medication regimen and does not have any barriers to adherence. The patient's self-management abilities are good. Return in about 6 weeks (around 4/21/2023) for Diabetes Follow Up, HTN Follow Up, Fasting Labs. An electronic signature was used to authenticate this note.     --PHONG Gilmore - CNP on 3/13/2023 at 5:28 PM

## 2023-03-13 NOTE — PATIENT INSTRUCTIONS
Take zyrtec or claritin by mouth daily until weather stabilizes  Flonase nasal spray daily  Meclizine by mouth as needed for consistent dizziness    You may receive a survey regarding the care you received during your visit. Your input is valuable to us. We encourage you to complete and return your survey. We hope you will choose us in the future for your healthcare needs. GENERAL OFFICE POLICIES      Telephone Calls: Messages will be answered within 1-2 business days, unless the provider is out of the office. If it is urgent a covering provider will answer. (this does not include Medication refills). MyChart: We recommend all patients sign up for [employer]. Through this portal you can see your lab results, request refills, schedule appointments, pay your bill and send messages to the office. [employer] messages will be answered within 1-2 business days unless the provider is out of the office. For urgent matters, please call the office. Appointments:  All appointments must be scheduled. We ask all patients to schedule their next follow up appointment before they leave the office to make sure you will be able to be seen before you run out of medications. 24 hours notice is required to cancel or reschedule an appointment to avoid being marked as a no show. You may be dismissed from the practice after 3 no shows. LATE for Appointment: If you are 15 or more minutes late for your appointment, you may be asked to reschedule. MA/LAB APPTS: Must be scheduled, cannot accept walk in lab visits. We only draw labs for patients established in our office. We only do injections for medications ordered by our office. Acute Sick Visits:  Nothing other than acute complaint will be addressed at this visit. TRADITIONAL MEDICARE  DOES NOT COVER PHYSICALS  MEDICARE WELLNESS VISITS: These are NOT physicals but the free annual visit offered by Medicare to discuss wellness issues.  Medication refills, checkups, etc. will not be addressed during this visit. Medication Refills: Refills are handled electronically so please contact your pharmacy for medication refills even if current refills have been exhausted. If you are on a controlled medication you will be referred to a specialist (pain specialist, psychiatry, etc). Forms: There is a $35 fee to fill out FMLA/Disability paperwork, payable at time of . Instead of the fee, you can choose to have the paperwork filled out during a separate office visit that is for filling out the paperwork only. Medication Samples: This office does not carry medication samples. If you need assistance in getting your medications, then please let the medical assistant know so they can help you sign up for a drug assistance program that can help get medications at a reduced cost or even free (if you qualify). Workman's Comp Claims: We do not handle workman's comp cases or claims. You will need to go to an urgent care to be seen or to whomever your employer uses.   General - Any abusive/rude behavior toward staff/providers may be cause for dismissal.

## 2023-03-24 LAB — NONINV COLON CA DNA+OCC BLD SCRN STL QL: NEGATIVE

## 2023-03-30 RX ORDER — METOPROLOL SUCCINATE 25 MG/1
TABLET, EXTENDED RELEASE ORAL
Qty: 90 TABLET | Refills: 0 | Status: SHIPPED | OUTPATIENT
Start: 2023-03-30

## 2023-04-21 ENCOUNTER — OFFICE VISIT (OUTPATIENT)
Dept: FAMILY MEDICINE CLINIC | Age: 63
End: 2023-04-21
Payer: COMMERCIAL

## 2023-04-21 VITALS
OXYGEN SATURATION: 99 % | HEIGHT: 63 IN | SYSTOLIC BLOOD PRESSURE: 116 MMHG | DIASTOLIC BLOOD PRESSURE: 68 MMHG | WEIGHT: 199 LBS | HEART RATE: 55 BPM | BODY MASS INDEX: 35.26 KG/M2

## 2023-04-21 DIAGNOSIS — F41.9 ANXIETY: ICD-10-CM

## 2023-04-21 DIAGNOSIS — G89.29 CHRONIC PAIN OF RIGHT KNEE: ICD-10-CM

## 2023-04-21 DIAGNOSIS — M25.561 CHRONIC PAIN OF RIGHT KNEE: ICD-10-CM

## 2023-04-21 DIAGNOSIS — M79.605 PAIN IN BOTH LOWER EXTREMITIES: ICD-10-CM

## 2023-04-21 DIAGNOSIS — R42 VERTIGO: Primary | ICD-10-CM

## 2023-04-21 DIAGNOSIS — M79.604 PAIN IN BOTH LOWER EXTREMITIES: ICD-10-CM

## 2023-04-21 DIAGNOSIS — E55.9 VITAMIN D INSUFFICIENCY: ICD-10-CM

## 2023-04-21 DIAGNOSIS — F41.1 GENERALIZED ANXIETY DISORDER: ICD-10-CM

## 2023-04-21 DIAGNOSIS — R73.03 PREDIABETES: ICD-10-CM

## 2023-04-21 DIAGNOSIS — E78.00 PURE HYPERCHOLESTEROLEMIA: ICD-10-CM

## 2023-04-21 PROCEDURE — 99214 OFFICE O/P EST MOD 30 MIN: CPT | Performed by: FAMILY MEDICINE

## 2023-04-21 RX ORDER — HYDROCODONE BITARTRATE AND ACETAMINOPHEN 7.5; 325 MG/1; MG/1
1-1.5 TABLET ORAL NIGHTLY
Qty: 40 TABLET | Refills: 0 | Status: SHIPPED | OUTPATIENT
Start: 2023-06-20 | End: 2023-07-20

## 2023-04-21 RX ORDER — HYDROCODONE BITARTRATE AND ACETAMINOPHEN 7.5; 325 MG/1; MG/1
1-1.5 TABLET ORAL NIGHTLY
Qty: 40 TABLET | Refills: 0 | Status: SHIPPED | OUTPATIENT
Start: 2023-04-21 | End: 2023-05-21

## 2023-04-21 RX ORDER — HYDROCODONE BITARTRATE AND ACETAMINOPHEN 7.5; 325 MG/1; MG/1
1-1.5 TABLET ORAL NIGHTLY
Qty: 40 TABLET | Refills: 0 | Status: SHIPPED | OUTPATIENT
Start: 2023-05-21 | End: 2023-06-20

## 2023-04-21 RX ORDER — ATORVASTATIN CALCIUM 40 MG/1
TABLET, FILM COATED ORAL
Qty: 90 TABLET | Refills: 3 | Status: SHIPPED | OUTPATIENT
Start: 2023-04-21

## 2023-04-21 NOTE — PROGRESS NOTES
warm and dry. Neurological:      Mental Status: She is alert and oriented to person, place, and time. Assessment/Plan:      Diagnosis Orders   1. Vertigo        2. Anxiety        3. Generalized anxiety disorder        4. Prediabetes        5. Pure hypercholesterolemia  atorvastatin (LIPITOR) 40 MG tablet      6. Vitamin D insufficiency        7. Chronic pain of right knee  HYDROcodone-acetaminophen (NORCO) 7.5-325 MG per tablet    HYDROcodone-acetaminophen (NORCO) 7.5-325 MG per tablet      8.  Pain in both lower extremities  HYDROcodone-acetaminophen (NORCO) 7.5-325 MG per tablet    HYDROcodone-acetaminophen (NORCO) 7.5-325 MG per tablet      Reviewed normal mmg/ cologuard  Mood doing well on pristiq - off lorazepam - has on hand to use only prn severe anxiety  Vertigo improved - cause d/w pt  Pain in legs - unknown cause of chronic pain going on for years - complete work up does not show clear cause of pain - norco controls pain and reduced dose to 7.5-10mg at night - working well and tolerated well  Better if keeps moving generally  oarrs reviewed and results c/w rx'd meds  Norco 7.5 #35 filled 3/27/23 - monthly  Will bump to #40 as ran out early, but needs to use judiciously w/ goal of reducing number again in future  Xanax 0.5mg #60 filled 2/8  A1c in 3 months on f/u  Uds okay 10/22  Labs 10/22 and 11/22 reviewed - recheck labs 6 months - stay hydrated  Ora Neil MD, MD  4/21/2023  4:31 PM

## 2023-05-02 ENCOUNTER — TELEPHONE (OUTPATIENT)
Dept: FAMILY MEDICINE CLINIC | Age: 63
End: 2023-05-02

## 2023-05-02 RX ORDER — METOPROLOL SUCCINATE 25 MG/1
TABLET, EXTENDED RELEASE ORAL
Qty: 90 TABLET | Refills: 0 | Status: SHIPPED | OUTPATIENT
Start: 2023-05-02

## 2023-05-04 ENCOUNTER — OFFICE VISIT (OUTPATIENT)
Dept: FAMILY MEDICINE CLINIC | Age: 63
End: 2023-05-04
Payer: COMMERCIAL

## 2023-05-04 VITALS
RESPIRATION RATE: 18 BRPM | OXYGEN SATURATION: 98 % | HEART RATE: 80 BPM | BODY MASS INDEX: 35.44 KG/M2 | WEIGHT: 200 LBS | DIASTOLIC BLOOD PRESSURE: 84 MMHG | HEIGHT: 63 IN | SYSTOLIC BLOOD PRESSURE: 128 MMHG

## 2023-05-04 DIAGNOSIS — R19.7 DIARRHEA, UNSPECIFIED TYPE: ICD-10-CM

## 2023-05-04 DIAGNOSIS — J22 ACUTE RESPIRATORY INFECTION: Primary | ICD-10-CM

## 2023-05-04 PROCEDURE — 99213 OFFICE O/P EST LOW 20 MIN: CPT

## 2023-05-04 ASSESSMENT — ENCOUNTER SYMPTOMS
SHORTNESS OF BREATH: 0
WHEEZING: 1
SORE THROAT: 0
EYE ITCHING: 0
ABDOMINAL DISTENTION: 0
SINUS PAIN: 0
NAUSEA: 1
TROUBLE SWALLOWING: 0
CONSTIPATION: 0
CHEST TIGHTNESS: 0
EYE REDNESS: 0
EYE DISCHARGE: 0
VOMITING: 0
COUGH: 1
RHINORRHEA: 1
DIARRHEA: 1
COLOR CHANGE: 0
ABDOMINAL PAIN: 0
BACK PAIN: 0
RECTAL PAIN: 0
SINUS PRESSURE: 0

## 2023-05-04 NOTE — PATIENT INSTRUCTIONS
FMLA/Disability paperwork, payable at time of . Instead of the fee, you can choose to have the paperwork filled out during a separate office visit that is for filling out the paperwork only. Medication Samples: This office does not carry medication samples. If you need assistance in getting your medications, then please let the medical assistant know so they can help you sign up for a drug assistance program that can help get medications at a reduced cost or even free (if you qualify). Workman's Comp Claims: We do not handle workman's comp cases or claims. You will need to go to an urgent care to be seen or to whomever your employer uses. General - Any abusive/rude behavior toward staff/providers may be cause for dismissal.    Jovanna Cheikh may receive a survey regarding the care you received during your visit. Your input is valuable to us. We encourage you to complete and return your survey. We hope you will choose us in the future for your healthcare needs.

## 2023-05-04 NOTE — PROGRESS NOTES
Margo Galloway (:  1960) is a 58 y.o. female,Established patient, here for evaluation of the following chief complaint(s):  Cough (Went to [address] [place] [address] for cough, got prednisone and made her worse.)         ASSESSMENT/PLAN:  1. Acute respiratory infection  -Bronchitis versus bacterial respiratory infection.  -Continue with amoxicillin 1000 milligrams 3 times daily X 5 days.  -Okay for Mucinex and Delsym OTC. Avoid OTCs that could increase blood pressure; pseudoephedrine, phenylephrine.  -Emphasis on rest and hydration.  -Work note provided for the patient.  -Continue as needed albuterol use for wheezing and shortness of breath. Educated that jitteriness and tachycardia can be expected side effects.  -No wheezing identified on physical exam.  X-ray not indicated at this time.  -Follow-up as needed. 2. Diarrhea, unspecified type  -Diarrhea likely secondary to antibiotics, presumed noninfectious in nature.  -Discussed okay to take Imodium as needed. Patient states she will get over-the-counter, or has at home.  -Educated on probiotic usage, especially with antibiotics. Probiotics not discussed with patient a [facility] clinic. Recommending probiotics twice daily for a month. -Emphasis on rest and hydration.  -Work note provided for the patient.  -If significant abdominal pain or cramping occurs, go to liquid only diet, then ImpactFlo, then slowly reintroduce foods.  -Follow-up as needed. Return if symptoms worsen or fail to improve. Subjective   SUBJECTIVE/OBJECTIVE:  QUIN  Mario Avila presents today with concerns of continuing cold and new diarrhea. Last Wednesday she developed cough, congestion, fatigue, and dizziness. She went to the [facility] and was placed on prednisone and was given prednisone. She got heart racing, and stopped the medication. She went back to the [facility] and was given an antibiotic and inhaler.  She gets jittery with the albuterol inhaler, and the antibiotic gave her

## 2023-05-17 ENCOUNTER — OFFICE VISIT (OUTPATIENT)
Dept: FAMILY MEDICINE CLINIC | Age: 63
End: 2023-05-17
Payer: COMMERCIAL

## 2023-05-17 VITALS
WEIGHT: 200 LBS | HEIGHT: 63 IN | HEART RATE: 82 BPM | BODY MASS INDEX: 35.44 KG/M2 | OXYGEN SATURATION: 100 % | RESPIRATION RATE: 16 BRPM | DIASTOLIC BLOOD PRESSURE: 90 MMHG | SYSTOLIC BLOOD PRESSURE: 152 MMHG

## 2023-05-17 DIAGNOSIS — I10 ESSENTIAL HYPERTENSION: Primary | ICD-10-CM

## 2023-05-17 PROCEDURE — 99213 OFFICE O/P EST LOW 20 MIN: CPT | Performed by: NURSE PRACTITIONER

## 2023-05-17 PROCEDURE — 3077F SYST BP >= 140 MM HG: CPT | Performed by: NURSE PRACTITIONER

## 2023-05-17 PROCEDURE — 3079F DIAST BP 80-89 MM HG: CPT | Performed by: NURSE PRACTITIONER

## 2023-05-17 ASSESSMENT — ENCOUNTER SYMPTOMS
NAUSEA: 0
COUGH: 0
DIARRHEA: 0
ABDOMINAL PAIN: 0
SINUS PRESSURE: 0
COLOR CHANGE: 0
EYE DISCHARGE: 0
SHORTNESS OF BREATH: 0
ABDOMINAL DISTENTION: 0
SINUS PAIN: 0
CHEST TIGHTNESS: 0
CONSTIPATION: 0
BACK PAIN: 0

## 2023-05-17 NOTE — PATIENT INSTRUCTIONS
at time of . Instead of the fee, you can choose to have the paperwork filled out during a separate office visit that is for filling out the paperwork only. Medication Samples: This office does not carry medication samples. If you need assistance in getting your medications, then please let the medical assistant know so they can help you sign up for a drug assistance program that can help get medications at a reduced cost or even free (if you qualify). Workman's Comp Claims: We do not handle workman's comp cases or claims. You will need to go to an urgent care to be seen or to whomever your employer uses. General - Any abusive/rude behavior toward staff/providers may be cause for dismissal.    Franci Christian may receive a survey regarding the care you received during your visit. Your input is valuable to us. We encourage you to complete and return your survey. We hope you will choose us in the future for your healthcare needs.

## 2023-06-02 RX ORDER — METOPROLOL SUCCINATE 25 MG/1
TABLET, EXTENDED RELEASE ORAL
Qty: 90 TABLET | Refills: 0 | Status: SHIPPED | OUTPATIENT
Start: 2023-06-02

## 2023-06-27 ENCOUNTER — NURSE ONLY (OUTPATIENT)
Dept: FAMILY MEDICINE CLINIC | Age: 63
End: 2023-06-27
Payer: COMMERCIAL

## 2023-06-27 DIAGNOSIS — I10 ESSENTIAL HYPERTENSION: ICD-10-CM

## 2023-06-27 LAB
ANION GAP SERPL CALCULATED.3IONS-SCNC: 11 MMOL/L (ref 3–16)
BUN SERPL-MCNC: 20 MG/DL (ref 7–20)
CALCIUM SERPL-MCNC: 9.8 MG/DL (ref 8.3–10.6)
CHLORIDE SERPL-SCNC: 100 MMOL/L (ref 99–110)
CO2 SERPL-SCNC: 30 MMOL/L (ref 21–32)
CREAT SERPL-MCNC: 1 MG/DL (ref 0.6–1.2)
GFR SERPLBLD CREATININE-BSD FMLA CKD-EPI: >60 ML/MIN/{1.73_M2}
GLUCOSE SERPL-MCNC: 82 MG/DL (ref 70–99)
POTASSIUM SERPL-SCNC: 4.3 MMOL/L (ref 3.5–5.1)
SODIUM SERPL-SCNC: 141 MMOL/L (ref 136–145)

## 2023-06-27 PROCEDURE — 36415 COLL VENOUS BLD VENIPUNCTURE: CPT | Performed by: FAMILY MEDICINE

## 2023-07-05 RX ORDER — METOPROLOL SUCCINATE 25 MG/1
TABLET, EXTENDED RELEASE ORAL
Qty: 90 TABLET | Refills: 0 | Status: SHIPPED | OUTPATIENT
Start: 2023-07-05

## 2023-07-19 ENCOUNTER — TELEPHONE (OUTPATIENT)
Dept: FAMILY MEDICINE CLINIC | Age: 63
End: 2023-07-19

## 2023-07-19 DIAGNOSIS — M79.605 PAIN IN BOTH LOWER EXTREMITIES: ICD-10-CM

## 2023-07-19 DIAGNOSIS — M79.604 PAIN IN BOTH LOWER EXTREMITIES: ICD-10-CM

## 2023-07-19 RX ORDER — HYDROCODONE BITARTRATE AND ACETAMINOPHEN 7.5; 325 MG/1; MG/1
1-1.5 TABLET ORAL NIGHTLY
Qty: 40 TABLET | Refills: 0 | Status: CANCELLED | OUTPATIENT
Start: 2023-07-19 | End: 2023-08-18

## 2023-07-19 NOTE — TELEPHONE ENCOUNTER
----- Message from Ivett Kennethandrew sent at 7/18/2023  4:26 PM EDT -----  Subject: Refill Request    QUESTIONS  Name of Medication? HYDROcodone-acetaminophen (NORCO) 7.5-325 MG per   tablet  Patient-reported dosage and instructions? 1 to 1 1/2 - 7.5-325 mg - 1 time   nightly   How many days do you have left? 4  Preferred Pharmacy? [address] [facility] [id number]  Pharmacy phone number (if available)? [phone]  Additional Information for Provider? Patient is requesting a limited   refill of this medication to last her until her rescheduled appointment. PCP out of the office patient scheduled for 7/31/2023.   ---------------------------------------------------------------------------  --------------  CALL BACK INFO  What is the best way for the office to contact you? OK to leave message on   voicemail,OK to respond with electronic message via [employer] portal (only   for patients who have registered [employer] account)  Preferred Call Back Phone Number? [phone]  ---------------------------------------------------------------------------  --------------  SCRIPT ANSWERS  Relationship to Patient?  Self

## 2023-07-20 DIAGNOSIS — M79.604 PAIN IN BOTH LOWER EXTREMITIES: ICD-10-CM

## 2023-07-20 DIAGNOSIS — M79.605 PAIN IN BOTH LOWER EXTREMITIES: ICD-10-CM

## 2023-07-20 RX ORDER — HYDROCODONE BITARTRATE AND ACETAMINOPHEN 7.5; 325 MG/1; MG/1
1-1.5 TABLET ORAL NIGHTLY
Qty: 40 TABLET | Refills: 0 | Status: SHIPPED | OUTPATIENT
Start: 2023-07-22 | End: 2023-08-11 | Stop reason: SDUPTHER

## 2023-08-03 RX ORDER — METOPROLOL SUCCINATE 25 MG/1
TABLET, EXTENDED RELEASE ORAL
Qty: 90 TABLET | Refills: 0 | Status: SHIPPED | OUTPATIENT
Start: 2023-08-03

## 2023-08-03 NOTE — TELEPHONE ENCOUNTER
Addended by: Rosalina Bedolla on: 9/8/2022 04:34 PM     Modules accepted: Orders LV 6/16/23 WITH DR HARRIS FOR HTN NV 8/11/23

## 2023-08-11 ENCOUNTER — TELEPHONE (OUTPATIENT)
Dept: FAMILY MEDICINE CLINIC | Age: 63
End: 2023-08-11

## 2023-08-11 ENCOUNTER — OFFICE VISIT (OUTPATIENT)
Dept: FAMILY MEDICINE CLINIC | Age: 63
End: 2023-08-11
Payer: COMMERCIAL

## 2023-08-11 VITALS
DIASTOLIC BLOOD PRESSURE: 80 MMHG | WEIGHT: 207 LBS | HEART RATE: 70 BPM | BODY MASS INDEX: 36.68 KG/M2 | SYSTOLIC BLOOD PRESSURE: 124 MMHG | HEIGHT: 63 IN | OXYGEN SATURATION: 97 %

## 2023-08-11 DIAGNOSIS — L30.9 DERMATITIS: ICD-10-CM

## 2023-08-11 DIAGNOSIS — M79.605 PAIN IN BOTH LOWER EXTREMITIES: ICD-10-CM

## 2023-08-11 DIAGNOSIS — R73.03 PREDIABETES: ICD-10-CM

## 2023-08-11 DIAGNOSIS — I10 ESSENTIAL HYPERTENSION: Primary | ICD-10-CM

## 2023-08-11 DIAGNOSIS — M79.604 PAIN IN BOTH LOWER EXTREMITIES: ICD-10-CM

## 2023-08-11 DIAGNOSIS — E78.00 PURE HYPERCHOLESTEROLEMIA: ICD-10-CM

## 2023-08-11 DIAGNOSIS — F41.1 GENERALIZED ANXIETY DISORDER: ICD-10-CM

## 2023-08-11 LAB — HBA1C MFR BLD: 6.1 %

## 2023-08-11 PROCEDURE — 83037 HB GLYCOSYLATED A1C HOME DEV: CPT

## 2023-08-11 PROCEDURE — 3074F SYST BP LT 130 MM HG: CPT

## 2023-08-11 PROCEDURE — 3079F DIAST BP 80-89 MM HG: CPT

## 2023-08-11 PROCEDURE — 99213 OFFICE O/P EST LOW 20 MIN: CPT

## 2023-08-11 RX ORDER — DESVENLAFAXINE 100 MG/1
100 TABLET, EXTENDED RELEASE ORAL NIGHTLY
Qty: 90 TABLET | Refills: 3 | Status: SHIPPED | OUTPATIENT
Start: 2023-08-11

## 2023-08-11 RX ORDER — HYDROCODONE BITARTRATE AND ACETAMINOPHEN 7.5; 325 MG/1; MG/1
1-1.5 TABLET ORAL NIGHTLY
Qty: 40 TABLET | Refills: 0 | Status: SHIPPED | OUTPATIENT
Start: 2023-08-21 | End: 2023-09-20

## 2023-08-11 ASSESSMENT — ENCOUNTER SYMPTOMS
WHEEZING: 0
VOMITING: 0
CHEST TIGHTNESS: 0
EYE PAIN: 0
COUGH: 0
ABDOMINAL PAIN: 0
SHORTNESS OF BREATH: 0
COLOR CHANGE: 0
EYE ITCHING: 0
BACK PAIN: 0
DIARRHEA: 0
PHOTOPHOBIA: 0
NAUSEA: 0

## 2023-08-11 NOTE — PROGRESS NOTES
Bonilla Mena (:  1960) is a 61 y.o. female,Established patient, here for evaluation of the following chief complaint(s):  Hypertension (ROUTINE HTN )         ASSESSMENT/PLAN:  1. Essential hypertension  -Controlled with losartan hydrochlorothiazide and metoprolol.  -Health goals: Weight loss of 5-10% of your current body weight, 1-2 pound weight loss per week; drink at least 64 ounces of water a day, exercise at least 150 minutes/week, sleep between 6 to 10 hours nightly, consume approximately 2,000 calories daily.   -Follow-up in 3 months. 2. Prediabetes  -Controlled with A1c of 6.1%.  -Health goals: Weight loss of 5-10% of your current body weight, 1-2 pound weight loss per week; drink at least 64 ounces of water a day, exercise at least 150 minutes/week, sleep between 6 to 10 hours nightly, consume approximately 2,000 calories daily.   -Follow-up annually. -     POCT glycosylated hemoglobin (Hb A1C)  3. Pure hypercholesterolemia  -Managed with atorvastatin.  -Drink at least 64 oz of water a day with goal of 80 oz, sleep more than 6 hours but less than 10 hours nightly, exercise at least 150 minutes weekly, and limit diet to approximately 2,000-calories a day. In the diet, limit fatty processed food, increase healthy fats/HDLs (such as cod, salmon, tuna, walnuts or fish oil supplements to boost HDL levels (good cholesterol)), ensure enough fiber via 5 servings of fruits and vegetables daily, and limit toxins (alcohol, smoking, etc). 4. Pain in both lower extremities  -After work pain control with Norco 1 to 1.5 tablets nightly as needed. Patiently takes on work nights.  -Med contract and UDS are up-to-date. Due at next appointment. -PDMP/OARRS reviewed. -Refill sent to the pharmacy.  -Follow-up in 3 months.  - HYDROcodone-acetaminophen (NORCO) 7.5-325 MG per tablet; Take 1-1.5 tablets by mouth nightly for 30 days. Fill 23 or after Max Daily Amount: 1.5 tablets  Dispense: 40 tablet;  Refill:

## 2023-08-11 NOTE — TELEPHONE ENCOUNTER
PER LG, WILL RX ON MONTHLY BASIS. NOT 3 TOTAL. ADVISED PATIENT TO JonglaT MESSAGE NEXT MONTH FOR REFILL.  ZACKARY

## 2023-08-11 NOTE — PATIENT INSTRUCTIONS
Health goals: Weight loss of 5-10% of your current body weight, 1-2 pound weight loss per week; drink at least 64 ounces of water a day, exercise at least 150 minutes/week, sleep between 6 to 10 hours nightly, consume approximately 2,000 calories daily. GENERAL OFFICE POLICIES      Telephone Calls: Messages will be answered within 1-2 business days, unless the provider is out of the office. If it is urgent a covering provider will answer. (this does not include Medication refills). MyChart: We recommend all patients sign up for [employer]. Through this portal you can see your lab results, request refills, schedule appointments, pay your bill and send messages to the office. [employer] messages will be answered within 1-2 business days unless the provider is out of the office. For urgent matters, please call the office. Appointments:  All appointments must be scheduled. We ask all patients to schedule their next follow up appointment before they leave the office to make sure you will be able to be seen before you run out of medications. 24 hours notice is required to cancel or reschedule an appointment to avoid being marked as a no show. You may be dismissed from the practice after 3 no shows. LATE for Appointment: If you are 15 or more minutes late for your appointment, you may be asked to reschedule. MA/LAB APPTS: Must be scheduled, cannot accept walk in lab visits. We only draw labs for patients established in our office. We only do injections for medications ordered by our office. Acute Sick Visits:  Nothing other than acute complaint will be addressed at this visit. TRADITIONAL MEDICARE  DOES NOT COVER PHYSICALS  MEDICARE WELLNESS VISITS: These are NOT physicals but the free annual visit offered by Medicare to discuss wellness issues. Medication refills, checkups, etc. will not be addressed during this visit.   Medication Refills: Refills are handled electronically so please contact your

## 2023-08-11 NOTE — TELEPHONE ENCOUNTER
PT WAS SEEN TODAY -  HER PAIN MEDS (NORCO) WAS SUPPOSE TO BE FOR 3 MONTHS - ONE FOR NOW - SEPT AND OCT.     PLEASE CALL PHARMACY       PT @  [phone]

## 2023-08-16 ENCOUNTER — PATIENT MESSAGE (OUTPATIENT)
Dept: FAMILY MEDICINE CLINIC | Age: 63
End: 2023-08-16

## 2023-08-16 NOTE — TELEPHONE ENCOUNTER
From: Bonilla Mena  To: Donita Soni  Sent: 8/16/2023 9:46 AM EDT  Subject: Medical statement     If someone can fill out this medical form out for my workplace

## 2023-09-07 RX ORDER — METOPROLOL SUCCINATE 25 MG/1
TABLET, EXTENDED RELEASE ORAL
Qty: 90 TABLET | Refills: 0 | Status: SHIPPED | OUTPATIENT
Start: 2023-09-07

## 2023-09-16 DIAGNOSIS — M79.604 PAIN IN BOTH LOWER EXTREMITIES: ICD-10-CM

## 2023-09-16 DIAGNOSIS — M79.605 PAIN IN BOTH LOWER EXTREMITIES: ICD-10-CM

## 2023-09-18 RX ORDER — HYDROCODONE BITARTRATE AND ACETAMINOPHEN 7.5; 325 MG/1; MG/1
1-1.5 TABLET ORAL NIGHTLY
Qty: 40 TABLET | Refills: 0 | Status: SHIPPED | OUTPATIENT
Start: 2023-09-20 | End: 2023-10-16 | Stop reason: SDUPTHER

## 2023-10-04 RX ORDER — METOPROLOL SUCCINATE 25 MG/1
TABLET, EXTENDED RELEASE ORAL
Qty: 90 TABLET | Refills: 0 | Status: SHIPPED | OUTPATIENT
Start: 2023-10-04

## 2023-10-05 ENCOUNTER — TELEPHONE (OUTPATIENT)
Dept: FAMILY MEDICINE CLINIC | Age: 63
End: 2023-10-05

## 2023-10-05 DIAGNOSIS — I10 ESSENTIAL HYPERTENSION: Primary | ICD-10-CM

## 2023-10-05 DIAGNOSIS — E78.00 PURE HYPERCHOLESTEROLEMIA: ICD-10-CM

## 2023-10-05 DIAGNOSIS — R00.2 PALPITATIONS: ICD-10-CM

## 2023-10-05 DIAGNOSIS — R73.03 PREDIABETES: ICD-10-CM

## 2023-10-05 NOTE — TELEPHONE ENCOUNTER
Orders placed    --Oniel Couch, PHONG - CNP Pt refused night time medications. Dr Figueredo paged. Will continue w/ plan of care

## 2023-10-15 DIAGNOSIS — M79.605 PAIN IN BOTH LOWER EXTREMITIES: ICD-10-CM

## 2023-10-15 DIAGNOSIS — M79.604 PAIN IN BOTH LOWER EXTREMITIES: ICD-10-CM

## 2023-10-15 RX ORDER — HYDROCODONE BITARTRATE AND ACETAMINOPHEN 7.5; 325 MG/1; MG/1
1-1.5 TABLET ORAL NIGHTLY
Qty: 40 TABLET | Refills: 0 | Status: CANCELLED | OUTPATIENT
Start: 2023-10-15 | End: 2023-11-14

## 2023-11-01 RX ORDER — METOPROLOL SUCCINATE 25 MG/1
TABLET, EXTENDED RELEASE ORAL
Qty: 90 TABLET | Refills: 0 | Status: SHIPPED | OUTPATIENT
Start: 2023-11-01

## 2023-11-07 ENCOUNTER — NURSE ONLY (OUTPATIENT)
Dept: FAMILY MEDICINE CLINIC | Age: 63
End: 2023-11-07
Payer: COMMERCIAL

## 2023-11-07 DIAGNOSIS — R00.2 PALPITATIONS: ICD-10-CM

## 2023-11-07 DIAGNOSIS — I10 ESSENTIAL HYPERTENSION: ICD-10-CM

## 2023-11-07 DIAGNOSIS — R73.03 PREDIABETES: ICD-10-CM

## 2023-11-07 DIAGNOSIS — E78.00 PURE HYPERCHOLESTEROLEMIA: ICD-10-CM

## 2023-11-07 LAB
ALBUMIN SERPL-MCNC: 4.2 G/DL (ref 3.4–5)
ALBUMIN/GLOB SERPL: 2 {RATIO} (ref 1.1–2.2)
ALP SERPL-CCNC: 68 U/L (ref 40–129)
ALT SERPL-CCNC: 20 U/L (ref 10–40)
ANION GAP SERPL CALCULATED.3IONS-SCNC: 10 MMOL/L (ref 3–16)
AST SERPL-CCNC: 20 U/L (ref 15–37)
BILIRUB SERPL-MCNC: 0.4 MG/DL (ref 0–1)
BUN SERPL-MCNC: 18 MG/DL (ref 7–20)
CALCIUM SERPL-MCNC: 9.2 MG/DL (ref 8.3–10.6)
CHLORIDE SERPL-SCNC: 100 MMOL/L (ref 99–110)
CHOLEST SERPL-MCNC: 175 MG/DL (ref 0–199)
CO2 SERPL-SCNC: 28 MMOL/L (ref 21–32)
CREAT SERPL-MCNC: 0.9 MG/DL (ref 0.6–1.2)
GFR SERPLBLD CREATININE-BSD FMLA CKD-EPI: >60 ML/MIN/{1.73_M2}
GLUCOSE SERPL-MCNC: 127 MG/DL (ref 70–99)
HDLC SERPL-MCNC: 56 MG/DL (ref 40–60)
LDLC SERPL CALC-MCNC: 93 MG/DL
MAGNESIUM SERPL-MCNC: 2 MG/DL (ref 1.8–2.4)
POTASSIUM SERPL-SCNC: 4.5 MMOL/L (ref 3.5–5.1)
PROT SERPL-MCNC: 6.3 G/DL (ref 6.4–8.2)
SODIUM SERPL-SCNC: 138 MMOL/L (ref 136–145)
TRIGL SERPL-MCNC: 132 MG/DL (ref 0–150)
VLDLC SERPL CALC-MCNC: 26 MG/DL

## 2023-11-07 PROCEDURE — 36415 COLL VENOUS BLD VENIPUNCTURE: CPT

## 2023-11-08 LAB
EST. AVERAGE GLUCOSE BLD GHB EST-MCNC: 148.5 MG/DL
HBA1C MFR BLD: 6.8 %

## 2023-11-09 ENCOUNTER — OFFICE VISIT (OUTPATIENT)
Dept: FAMILY MEDICINE CLINIC | Age: 63
End: 2023-11-09

## 2023-11-09 ENCOUNTER — TELEPHONE (OUTPATIENT)
Dept: FAMILY MEDICINE CLINIC | Age: 63
End: 2023-11-09

## 2023-11-09 VITALS
OXYGEN SATURATION: 99 % | HEIGHT: 63 IN | SYSTOLIC BLOOD PRESSURE: 134 MMHG | WEIGHT: 219 LBS | HEART RATE: 88 BPM | BODY MASS INDEX: 38.8 KG/M2 | DIASTOLIC BLOOD PRESSURE: 78 MMHG | RESPIRATION RATE: 18 BRPM

## 2023-11-09 DIAGNOSIS — Z00.00 ENCOUNTER FOR WELL ADULT EXAM WITHOUT ABNORMAL FINDINGS: Primary | ICD-10-CM

## 2023-11-09 DIAGNOSIS — M79.605 PAIN IN BOTH LOWER EXTREMITIES: ICD-10-CM

## 2023-11-09 DIAGNOSIS — F41.1 GENERALIZED ANXIETY DISORDER: ICD-10-CM

## 2023-11-09 DIAGNOSIS — E11.65 TYPE 2 DIABETES MELLITUS WITH HYPERGLYCEMIA, WITHOUT LONG-TERM CURRENT USE OF INSULIN (HCC): Primary | ICD-10-CM

## 2023-11-09 DIAGNOSIS — M79.604 PAIN IN BOTH LOWER EXTREMITIES: ICD-10-CM

## 2023-11-09 DIAGNOSIS — E11.65 TYPE 2 DIABETES MELLITUS WITH HYPERGLYCEMIA, WITHOUT LONG-TERM CURRENT USE OF INSULIN (HCC): ICD-10-CM

## 2023-11-09 DIAGNOSIS — Z79.899 LONG-TERM USE OF HIGH-RISK MEDICATION: ICD-10-CM

## 2023-11-09 RX ORDER — HYDROCODONE BITARTRATE AND ACETAMINOPHEN 7.5; 325 MG/1; MG/1
1-1.5 TABLET ORAL NIGHTLY
Qty: 40 TABLET | Refills: 0 | Status: SHIPPED | OUTPATIENT
Start: 2023-11-20 | End: 2023-12-20

## 2023-11-09 RX ORDER — HYDROCODONE BITARTRATE AND ACETAMINOPHEN 7.5; 325 MG/1; MG/1
1-1.5 TABLET ORAL NIGHTLY
Qty: 40 TABLET | Refills: 0 | Status: SHIPPED | OUTPATIENT
Start: 2023-12-20 | End: 2024-01-19

## 2023-11-09 RX ORDER — DESVENLAFAXINE 100 MG/1
100 TABLET, EXTENDED RELEASE ORAL NIGHTLY
Qty: 90 TABLET | Refills: 3 | Status: SHIPPED | OUTPATIENT
Start: 2023-11-09

## 2023-11-09 RX ORDER — HYDROCODONE BITARTRATE AND ACETAMINOPHEN 7.5; 325 MG/1; MG/1
1-1.5 TABLET ORAL NIGHTLY
Qty: 40 TABLET | Refills: 0 | Status: SHIPPED | OUTPATIENT
Start: 2024-01-19 | End: 2024-02-18

## 2023-11-09 ASSESSMENT — ENCOUNTER SYMPTOMS
ABDOMINAL PAIN: 0
SHORTNESS OF BREATH: 0
ABDOMINAL DISTENTION: 0
CHEST TIGHTNESS: 0
PHOTOPHOBIA: 0
WHEEZING: 0
VOMITING: 0
COLOR CHANGE: 0
CONSTIPATION: 0
BACK PAIN: 0
DIARRHEA: 0
COUGH: 0
NAUSEA: 0

## 2023-11-09 NOTE — PATIENT INSTRUCTIONS
Limit daily calories to 1,200-2,000. GENERAL OFFICE POLICIES      Telephone Calls: Messages will be answered within 1-2 business days, unless the provider is out of the office. If it is urgent a covering provider will answer. (this does not include Medication refills). MyChart: We recommend all patients sign up for MyChart. Through this portal you can see your lab results, request refills, schedule appointments, pay your bill and send messages to the office. MyChart messages will be answered within 1-2 business days unless the provider is out of the office. For urgent matters, please call the office. Appointments:  All appointments must be scheduled. We ask all patients to schedule their next follow up appointment before they leave the office to make sure you will be able to be seen before you run out of medications. 24 hours notice is required to cancel or reschedule an appointment to avoid being marked as a no show. You may be dismissed from the practice after 3 no shows. LATE for Appointment: If you are 15 or more minutes late for your appointment, you may be asked to reschedule. MA/LAB APPTS: Must be scheduled, cannot accept walk in lab visits. We only draw labs for patients established in our office. We only do injections for medications ordered by our office. Acute Sick Visits:  Nothing other than acute complaint will be addressed at this visit. TRADITIONAL MEDICARE  DOES NOT COVER PHYSICALS  MEDICARE WELLNESS VISITS: These are NOT physicals but the free annual visit offered by Medicare to discuss wellness issues. Medication refills, checkups, etc. will not be addressed during this visit. Medication Refills: Refills are handled electronically so please contact your pharmacy for medication refills even if current refills have been exhausted. If you are on a controlled medication you will be referred to a specialist (pain specialist, psychiatry, etc). Forms:  There is a $35 fee to

## 2023-11-09 NOTE — PROGRESS NOTES
(ACWY) vaccine  Aged Out    Pneumococcal 0-64 years Vaccine  Aged Out    Diabetic foot exam  Discontinued    Diabetic retinal exam  Discontinued    Cervical cancer screen  Discontinued     Recommendations for Preventive Services Due: see orders and patient instructions/AVS.    Return in about 3 months (around 2/9/2024) for Diabetes Follow-up, Controlled Medications. --Hattie Mancini, APRN - CNP      Please note that this chart was generated using dragon dictation software. Although every effort was made to ensure the accuracy of this automated transcription, some errors in transcription may have occurred.

## 2023-11-10 NOTE — TELEPHONE ENCOUNTER
Submitted PA for Rybelsus 3MG tablets  Via CMM Key: [id number] STATUS: PENDING. Follow up done daily; if no response in three days we will refax for status check. If another three days goes by with no response we will call the insurance for status.

## 2023-11-12 ENCOUNTER — PATIENT MESSAGE (OUTPATIENT)
Dept: FAMILY MEDICINE CLINIC | Age: 63
End: 2023-11-12

## 2023-11-12 DIAGNOSIS — E11.65 TYPE 2 DIABETES MELLITUS WITH HYPERGLYCEMIA, WITHOUT LONG-TERM CURRENT USE OF INSULIN (HCC): Primary | ICD-10-CM

## 2023-11-13 ENCOUNTER — TELEPHONE (OUTPATIENT)
Dept: ADMINISTRATIVE | Age: 63
End: 2023-11-13

## 2023-11-13 ENCOUNTER — TELEPHONE (OUTPATIENT)
Dept: FAMILY MEDICINE CLINIC | Age: 63
End: 2023-11-13

## 2023-11-13 NOTE — TELEPHONE ENCOUNTER
Submitted PA for Ozempic (1 MG/DOSE) 4MG/3ML pen-injectors  Via CMM Key: [id number] STATUS: PENDING. Follow up done daily; if no response in three days we will refax for status check. If another three days goes by with no response we will call the insurance for status.

## 2023-11-13 NOTE — TELEPHONE ENCOUNTER
Px said she would like an alternative since she has not picked it up and would also prefer injectable.   AM

## 2023-11-13 NOTE — TELEPHONE ENCOUNTER
I found a case on CMM and it looked like someone was working on it. So since it was my doctor I answered questions and submitted to the plan.    Submitted PA for HYDROcodone-Acetaminophen 7.5-325MG tablets  Via CMM  Key: [id number] STATUS: PENDING.    Follow up done daily; if no response in three days we will refax for status check.  If another three days goes by with no response we will call the insurance for status.

## 2023-11-13 NOTE — TELEPHONE ENCOUNTER
The medication was DENIED; DENIAL letter uploaded to MEDIA. If you want an APPEAL; please note in this encounter what new information you would like to APPEAL with. Once complete route back to PA POOL. If this requires a response please respond to the pool ( P [facility] [address]). Thank you please advise patient.

## 2023-11-13 NOTE — TELEPHONE ENCOUNTER
Insurance would not cover this. If she has not picked this up, we can set something else. Regarding weight, we can switch to injectables if she would like, or we can try metformin first.  Metformin can potentially help lose some weight as well.

## 2023-11-14 NOTE — TELEPHONE ENCOUNTER
Received the patient call for a PA for [address] [place] [address]. It was submitted via [facility] already. Key: [id number] STATUS: DENIED    \"PA Case: [id number], Status: Denied. Notification: Completed. \"    If this requires a response please respond to the pool ( P [facility] [address]). Thank you please advise patient.

## 2023-11-14 NOTE — TELEPHONE ENCOUNTER
I called to check status and Juana told me that the case was cancelled. It was started on 10/20/2023. It wasn't finished.     Submitted PA for HYDROcodone-Acetaminophen 7.5-325MG tablets  Via CMM Key: [id number] STATUS: PENDING.    Follow up done daily; if no response in three days we will refax for status check.  If another three days goes by with no response we will call the insurance for status.

## 2023-11-20 ENCOUNTER — TELEPHONE (OUTPATIENT)
Dept: FAMILY MEDICINE CLINIC | Age: 63
End: 2023-11-20

## 2023-11-20 NOTE — TELEPHONE ENCOUNTER
It is good to know that the metformin caused her bad diarrhea so we can note this in her history. She is okay to stop metformin and start taking the Ozempic again.

## 2023-11-20 NOTE — TELEPHONE ENCOUNTER
Patient was calling because we put her on Metformin while we were waiting for the PA for the Ozempic. She said the Aurelio Cruz is ready for  so she wants to know if She should switch to the Ozempic and stop taking the Metformin. She wanted us to also Metformin has also given her bad diarrhea.      If we could please give her a call back sometime after 3pm due to work

## 2023-11-30 NOTE — PROGRESS NOTES
vaginal pressure. went to little 2 days ago they say no infection. odor no discharge. No recent pap. Home

## 2023-12-07 ENCOUNTER — TELEPHONE (OUTPATIENT)
Dept: FAMILY MEDICINE CLINIC | Age: 63
End: 2023-12-07

## 2023-12-07 DIAGNOSIS — R11.0 NAUSEA: Primary | ICD-10-CM

## 2023-12-07 RX ORDER — ONDANSETRON 4 MG/1
4 TABLET, FILM COATED ORAL 3 TIMES DAILY PRN
Qty: 30 TABLET | Refills: 5 | Status: SHIPPED | OUTPATIENT
Start: 2023-12-07

## 2023-12-07 NOTE — TELEPHONE ENCOUNTER
Pt was in 6 weeks ago and was put on Ozempic and says that she has no appetite and is nauseated. No vomiting but has that feeling. Is there something she can take or would you like to evaluate her again.   Please call pt to advise

## 2023-12-07 NOTE — TELEPHONE ENCOUNTER
These are classic side effects with Ozempic. Part of the intentional use of Ozempic is decreased appetite to help with weight loss. I can send Zofran to help with nausea, or we can stop Ozempic.

## 2024-01-15 ENCOUNTER — TELEPHONE (OUTPATIENT)
Dept: FAMILY MEDICINE CLINIC | Age: 64
End: 2024-01-15

## 2024-01-15 ENCOUNTER — E-VISIT (OUTPATIENT)
Dept: FAMILY MEDICINE CLINIC | Age: 64
End: 2024-01-15
Payer: COMMERCIAL

## 2024-01-15 DIAGNOSIS — R11.0 NAUSEA: ICD-10-CM

## 2024-01-15 DIAGNOSIS — E11.65 TYPE 2 DIABETES MELLITUS WITH HYPERGLYCEMIA, WITHOUT LONG-TERM CURRENT USE OF INSULIN (HCC): ICD-10-CM

## 2024-01-15 DIAGNOSIS — J01.90 ACUTE NON-RECURRENT SINUSITIS, UNSPECIFIED LOCATION: Primary | ICD-10-CM

## 2024-01-15 PROCEDURE — 99422 OL DIG E/M SVC 11-20 MIN: CPT

## 2024-01-15 RX ORDER — AZITHROMYCIN 250 MG/1
250 TABLET, FILM COATED ORAL SEE ADMIN INSTRUCTIONS
Qty: 6 TABLET | Refills: 0 | Status: SHIPPED | OUTPATIENT
Start: 2024-01-15 | End: 2024-01-20

## 2024-01-15 RX ORDER — ONDANSETRON 4 MG/1
4 TABLET, ORALLY DISINTEGRATING ORAL 3 TIMES DAILY PRN
Qty: 21 TABLET | Refills: 0 | Status: SHIPPED | OUTPATIENT
Start: 2024-01-15

## 2024-01-15 ASSESSMENT — LIFESTYLE VARIABLES: SMOKING_STATUS: NO, I'VE NEVER SMOKED

## 2024-01-15 NOTE — PROGRESS NOTES
1. Acute non-recurrent sinusitis, unspecified location  -Acute sinusitis, viral versus bacterial.  -Treatment options discussed.  Z-Lavon is being sent to the pharmacy.   The medication uses and side effects were discussed with the patient.  Patient verbalized understanding and agrees to the plan.  -Recommend she wait 1 to 2 days before picking up antibiotic.  -Emphasis on rest and hydration.  Can continue to use over-the-counter to help limit symptoms.  -Follow-up if no improvement.  - azithromycin (ZITHROMAX) 250 MG tablet; Take 1 tablet by mouth See Admin Instructions for 5 days 500mg on day 1 followed by 250mg on days 2 - 5  Dispense: 6 tablet; Refill: 0  2. Nausea  -Treatment options discussed.  Zofran is being sent to the pharmacy.   The medication uses and side effects were discussed with the patient.  Patient verbalized understanding and agrees to the plan.  -Increase hydration, brat diet, emphasize rest.  -Follow-up as needed.  - ondansetron (ZOFRAN-ODT) 4 MG disintegrating tablet; Take 1 tablet by mouth 3 times daily as needed for Nausea or Vomiting  Dispense: 21 tablet; Refill: 0    --Luke A Glischinski, APRN - CNP      Please note that this chart was generated using dragon dictation software.  Although every effort was made to ensure the accuracy of this automated transcription, some errors in transcription may have occurred.    A total of 11-20 minutes were spent on the entirety of this E-visit.

## 2024-01-15 NOTE — TELEPHONE ENCOUNTER
[facility] because they need clarification on the medication BLOOD GLUCOSE MONITOR KIT & SUPPLIES - THEY NEED A MAX PER DAY ON HOW OFTEN SHE SHOULD TEST. Please give them a call back.     [facility] - phone no. [phone]

## 2024-01-15 NOTE — TELEPHONE ENCOUNTER
[facility] AND SPOKE TO BERRY AND ADVISED ON 4 TIMES A DAY MAX DAILY TESTING. SC

## 2024-01-18 ENCOUNTER — TELEPHONE (OUTPATIENT)
Dept: FAMILY MEDICINE CLINIC | Age: 64
End: 2024-01-18

## 2024-01-18 DIAGNOSIS — E11.65 TYPE 2 DIABETES MELLITUS WITH HYPERGLYCEMIA, WITHOUT LONG-TERM CURRENT USE OF INSULIN (HCC): ICD-10-CM

## 2024-01-19 DIAGNOSIS — E11.65 TYPE 2 DIABETES MELLITUS WITH HYPERGLYCEMIA, WITHOUT LONG-TERM CURRENT USE OF INSULIN (HCC): ICD-10-CM

## 2024-01-31 DIAGNOSIS — E11.65 TYPE 2 DIABETES MELLITUS WITH HYPERGLYCEMIA, WITHOUT LONG-TERM CURRENT USE OF INSULIN (HCC): ICD-10-CM

## 2024-01-31 RX ORDER — SEMAGLUTIDE 1.34 MG/ML
INJECTION, SOLUTION SUBCUTANEOUS
Qty: 3 ML | Refills: 2 | OUTPATIENT
Start: 2024-01-31

## 2024-02-14 RX ORDER — LOSARTAN POTASSIUM AND HYDROCHLOROTHIAZIDE 12.5; 5 MG/1; MG/1
1 TABLET ORAL DAILY
Qty: 30 TABLET | Refills: 1 | Status: SHIPPED | OUTPATIENT
Start: 2024-02-14 | End: 2024-02-15

## 2024-02-15 ENCOUNTER — OFFICE VISIT (OUTPATIENT)
Dept: FAMILY MEDICINE CLINIC | Age: 64
End: 2024-02-15
Payer: COMMERCIAL

## 2024-02-15 VITALS
DIASTOLIC BLOOD PRESSURE: 66 MMHG | WEIGHT: 182 LBS | BODY MASS INDEX: 32.25 KG/M2 | OXYGEN SATURATION: 94 % | SYSTOLIC BLOOD PRESSURE: 108 MMHG | HEIGHT: 63 IN | HEART RATE: 66 BPM

## 2024-02-15 DIAGNOSIS — M79.604 PAIN IN BOTH LOWER EXTREMITIES: ICD-10-CM

## 2024-02-15 DIAGNOSIS — I10 ESSENTIAL HYPERTENSION: ICD-10-CM

## 2024-02-15 DIAGNOSIS — R00.2 PALPITATIONS: ICD-10-CM

## 2024-02-15 DIAGNOSIS — M79.605 PAIN IN BOTH LOWER EXTREMITIES: ICD-10-CM

## 2024-02-15 DIAGNOSIS — E11.65 TYPE 2 DIABETES MELLITUS WITH HYPERGLYCEMIA, WITHOUT LONG-TERM CURRENT USE OF INSULIN (HCC): Primary | ICD-10-CM

## 2024-02-15 LAB — HBA1C MFR BLD: 6.2 %

## 2024-02-15 PROCEDURE — 3074F SYST BP LT 130 MM HG: CPT

## 2024-02-15 PROCEDURE — 3078F DIAST BP <80 MM HG: CPT

## 2024-02-15 PROCEDURE — 99214 OFFICE O/P EST MOD 30 MIN: CPT

## 2024-02-15 PROCEDURE — 3044F HG A1C LEVEL LT 7.0%: CPT

## 2024-02-15 PROCEDURE — 83036 HEMOGLOBIN GLYCOSYLATED A1C: CPT

## 2024-02-15 RX ORDER — HYDROCODONE BITARTRATE AND ACETAMINOPHEN 7.5; 325 MG/1; MG/1
1-1.5 TABLET ORAL NIGHTLY
Qty: 40 TABLET | Refills: 0 | Status: SHIPPED | OUTPATIENT
Start: 2024-03-19 | End: 2024-04-18

## 2024-02-15 RX ORDER — METOPROLOL SUCCINATE 25 MG/1
TABLET, EXTENDED RELEASE ORAL
Qty: 270 TABLET | Refills: 3 | Status: SHIPPED | OUTPATIENT
Start: 2024-02-15

## 2024-02-15 RX ORDER — ESTRADIOL 0.1 MG/G
1 CREAM VAGINAL DAILY
Qty: 1 EACH | Refills: 5 | Status: SHIPPED | OUTPATIENT
Start: 2024-02-15

## 2024-02-15 RX ORDER — HYDROCODONE BITARTRATE AND ACETAMINOPHEN 7.5; 325 MG/1; MG/1
1-1.5 TABLET ORAL NIGHTLY
Qty: 40 TABLET | Refills: 0 | Status: SHIPPED | OUTPATIENT
Start: 2024-02-18 | End: 2024-03-19

## 2024-02-15 RX ORDER — HYDROCODONE BITARTRATE AND ACETAMINOPHEN 7.5; 325 MG/1; MG/1
1-1.5 TABLET ORAL NIGHTLY
Qty: 40 TABLET | Refills: 0 | Status: SHIPPED | OUTPATIENT
Start: 2024-04-18 | End: 2024-05-18

## 2024-02-15 NOTE — PATIENT INSTRUCTIONS

## 2024-02-15 NOTE — PROGRESS NOTES
injury. Normal range of motion.      Cervical back: Normal range of motion and neck supple. No tenderness.      Right lower leg: No edema.      Left lower leg: No edema.   Skin:     General: Skin is warm and dry.      Capillary Refill: Capillary refill takes less than 2 seconds.      Coloration: Skin is not jaundiced or pale.      Findings: No bruising or erythema.   Neurological:      Mental Status: She is alert and oriented to person, place, and time.      Motor: No weakness.      Gait: Gait normal.   Psychiatric:         Mood and Affect: Mood normal.         Behavior: Behavior normal.         Thought Content: Thought content normal.         Judgment: Judgment normal.                  An electronic signature was used to authenticate this note.    --Luke A Glischinski, APRN - CNP       Please note that this chart was generated using dragon dictation software.  Although every effort was made to ensure the accuracy of this automated transcription, some errors in transcription may have occurred.

## 2024-03-19 ENCOUNTER — TELEPHONE (OUTPATIENT)
Dept: FAMILY MEDICINE CLINIC | Age: 64
End: 2024-03-19

## 2024-03-19 NOTE — TELEPHONE ENCOUNTER
Patient was calling because we took her off a BP medication the last time she was here. Today she went to the dentist and has an extreme fear of the dentist and the 2 times they did her BP it was      160/90 and 165/105    They wanted her to call and check with us to make sure she should still be off this medication.     Can we please give her a call

## 2024-03-19 NOTE — TELEPHONE ENCOUNTER
Seeing that she has not gained weight, and she does have a fear of the dentist, I do not believe that she needs to change the medication at this time.  If her blood pressure is elevated at her office appointment in May, we may have a different conversation then.  Continue with the current plan without medication

## 2024-03-19 NOTE — TELEPHONE ENCOUNTER
May likely be due to fear of dentist.  Has she been checking her blood pressure at home?  How has her weight loss journey been going recently?

## 2024-03-19 NOTE — TELEPHONE ENCOUNTER
Px is currently at 179 lbs.  Px would like to know if she needs to do anything different regarding BP. She does not take her BP at home.  AM

## 2024-03-28 DIAGNOSIS — E11.65 TYPE 2 DIABETES MELLITUS WITH HYPERGLYCEMIA, WITHOUT LONG-TERM CURRENT USE OF INSULIN (HCC): ICD-10-CM

## 2024-04-26 ENCOUNTER — HOSPITAL ENCOUNTER (OUTPATIENT)
Dept: WOMENS IMAGING | Age: 64
Discharge: HOME | End: 2024-04-26
Payer: COMMERCIAL

## 2024-04-26 DIAGNOSIS — Z12.31 SCREENING MAMMOGRAM FOR BREAST CANCER: ICD-10-CM

## 2024-04-26 PROCEDURE — 77063 BREAST TOMOSYNTHESIS BI: CPT

## 2024-05-03 ENCOUNTER — TELEPHONE (OUTPATIENT)
Dept: FAMILY MEDICINE CLINIC | Age: 64
End: 2024-05-03

## 2024-05-03 DIAGNOSIS — E78.00 PURE HYPERCHOLESTEROLEMIA: ICD-10-CM

## 2024-05-03 RX ORDER — ATORVASTATIN CALCIUM 40 MG/1
TABLET, FILM COATED ORAL
Qty: 90 TABLET | Refills: 0 | Status: SHIPPED | OUTPATIENT
Start: 2024-05-03

## 2024-05-14 ENCOUNTER — OFFICE VISIT (OUTPATIENT)
Dept: FAMILY MEDICINE CLINIC | Age: 64
End: 2024-05-14
Payer: COMMERCIAL

## 2024-05-14 VITALS
SYSTOLIC BLOOD PRESSURE: 120 MMHG | HEIGHT: 63 IN | WEIGHT: 174 LBS | HEART RATE: 55 BPM | OXYGEN SATURATION: 99 % | DIASTOLIC BLOOD PRESSURE: 72 MMHG | BODY MASS INDEX: 30.83 KG/M2

## 2024-05-14 DIAGNOSIS — F41.1 GENERALIZED ANXIETY DISORDER: ICD-10-CM

## 2024-05-14 DIAGNOSIS — F41.9 ANXIETY: ICD-10-CM

## 2024-05-14 DIAGNOSIS — M79.605 PAIN IN BOTH LOWER EXTREMITIES: Primary | ICD-10-CM

## 2024-05-14 DIAGNOSIS — E78.00 PURE HYPERCHOLESTEROLEMIA: ICD-10-CM

## 2024-05-14 DIAGNOSIS — R73.03 PREDIABETES: ICD-10-CM

## 2024-05-14 DIAGNOSIS — F32.5 MAJOR DEPRESSION IN REMISSION (HCC): ICD-10-CM

## 2024-05-14 DIAGNOSIS — M79.604 PAIN IN BOTH LOWER EXTREMITIES: Primary | ICD-10-CM

## 2024-05-14 DIAGNOSIS — I10 ESSENTIAL HYPERTENSION: ICD-10-CM

## 2024-05-14 LAB — HBA1C MFR BLD: 5.3 %

## 2024-05-14 PROCEDURE — 3074F SYST BP LT 130 MM HG: CPT | Performed by: FAMILY MEDICINE

## 2024-05-14 PROCEDURE — 83036 HEMOGLOBIN GLYCOSYLATED A1C: CPT | Performed by: FAMILY MEDICINE

## 2024-05-14 PROCEDURE — G2211 COMPLEX E/M VISIT ADD ON: HCPCS | Performed by: FAMILY MEDICINE

## 2024-05-14 PROCEDURE — 99214 OFFICE O/P EST MOD 30 MIN: CPT | Performed by: FAMILY MEDICINE

## 2024-05-14 PROCEDURE — 3078F DIAST BP <80 MM HG: CPT | Performed by: FAMILY MEDICINE

## 2024-05-14 RX ORDER — HYDROCODONE BITARTRATE AND ACETAMINOPHEN 7.5; 325 MG/1; MG/1
1-1.5 TABLET ORAL NIGHTLY
Qty: 35 TABLET | Refills: 0 | Status: SHIPPED | OUTPATIENT
Start: 2024-06-20 | End: 2024-07-20

## 2024-05-14 RX ORDER — HYDROCODONE BITARTRATE AND ACETAMINOPHEN 7.5; 325 MG/1; MG/1
1-1.5 TABLET ORAL NIGHTLY
Qty: 35 TABLET | Refills: 0 | Status: SHIPPED | OUTPATIENT
Start: 2024-07-20 | End: 2024-08-19

## 2024-05-14 RX ORDER — DESVENLAFAXINE 100 MG/1
100 TABLET, EXTENDED RELEASE ORAL NIGHTLY
Qty: 30 TABLET | Refills: 5 | Status: SHIPPED | OUTPATIENT
Start: 2024-05-14

## 2024-05-14 RX ORDER — GABAPENTIN 300 MG/1
300 CAPSULE ORAL NIGHTLY
Qty: 30 CAPSULE | Refills: 2 | Status: SHIPPED | OUTPATIENT
Start: 2024-05-14 | End: 2024-06-13

## 2024-05-14 RX ORDER — HYDROCODONE BITARTRATE AND ACETAMINOPHEN 7.5; 325 MG/1; MG/1
1-1.5 TABLET ORAL NIGHTLY
Qty: 35 TABLET | Refills: 0 | Status: SHIPPED | OUTPATIENT
Start: 2024-05-21 | End: 2024-06-20

## 2024-05-14 SDOH — ECONOMIC STABILITY: FOOD INSECURITY: WITHIN THE PAST 12 MONTHS, THE FOOD YOU BOUGHT JUST DIDN'T LAST AND YOU DIDN'T HAVE MONEY TO GET MORE.: NEVER TRUE

## 2024-05-14 SDOH — ECONOMIC STABILITY: FOOD INSECURITY: WITHIN THE PAST 12 MONTHS, YOU WORRIED THAT YOUR FOOD WOULD RUN OUT BEFORE YOU GOT MONEY TO BUY MORE.: NEVER TRUE

## 2024-05-14 SDOH — ECONOMIC STABILITY: INCOME INSECURITY: HOW HARD IS IT FOR YOU TO PAY FOR THE VERY BASICS LIKE FOOD, HOUSING, MEDICAL CARE, AND HEATING?: NOT HARD AT ALL

## 2024-05-14 NOTE — PATIENT INSTRUCTIONS

## 2024-05-14 NOTE — PROGRESS NOTES
unspecified ovarian cyst     Depression     Plantar fasciitis     Encounter for long-term (current) use of other medications     Pure hypercholesterolemia      Past Medical History:   Diagnosis Date    ADD (attention deficit disorder)     Cervical dysplasia     Chronic leg pain     Depression     Diabetes mellitus, type 2 09/16/2013    Fibrocystic breast     Genital herpes 1994    Hemorrhoid     Hyperlipidemia     Hypertension     Metabolic syndrome     Mood disorder (HCC)     MRSA cellulitis 2007    facial    Obesity     Panic attacks     Plantar fasciitis     Recurrent UTI     Urethral stenosis     Urinary incontinence, mixed      Past Surgical History:   Procedure Laterality Date    CARPAL TUNNEL RELEASE Right 9/26/13    CHOLECYSTECTOMY      HYSTERECTOMY, TOTAL ABDOMINAL (CERVIX REMOVED)  10/2006    LABIAL ADHESION LYSIS  10/2006    OTHER SURGICAL HISTORY      No problems with anesthesia, no problems with healing, no problems with blood clots after surgery.      OTHER SURGICAL HISTORY  5/95    lipoma removal under arm    PLANTAR FASCIA SURGERY  October 2009    MRSA after surgery.     Office Visit on 02/15/2024   Component Date Value Ref Range Status    Hemoglobin A1C 02/15/2024 6.2  % Final     Family History   Problem Relation Age of Onset    Diabetes Mother     Drug Abuse Mother     Psoriasis Mother     Asthma Mother     COPD Mother     Other Mother         shingles; cervical dysplasia    Cancer Mother     Heart Disease Mother     Heart Disease Father     Diabetes Father     Other Father         kidney stones    Asthma Maternal Aunt     Diabetes Maternal Aunt     Heart Disease Maternal Aunt     Other Maternal Aunt         hep B/cirrhosis    Diabetes Other     Other Sister         1958 gallstones    Diabetes Sister     Cancer Sister         1958 hyst d/t Ca    Scoliosis Son     Other Son         hx of Cat scratch disease?    Heart Disease Son         cardiomyopathy    Asthma Son      Current Outpatient

## 2024-05-21 ENCOUNTER — TELEPHONE (OUTPATIENT)
Dept: ADMINISTRATIVE | Age: 64
End: 2024-05-21

## 2024-05-21 NOTE — TELEPHONE ENCOUNTER
Submitted PA for HYDROcodone-Acetaminophen 7.5-325MG tablets   Via CMM Key: [id number]  STATUS: PA Case: [id number], Status: Approved, Coverage Starts on: 5/21/2024 12:00:00 AM, Coverage Ends on: 11/17/2024 12:00:00 AM.     Please notify patient. Thank you.

## 2024-05-28 ENCOUNTER — OFFICE VISIT (OUTPATIENT)
Dept: GYNECOLOGY | Age: 64
End: 2024-05-28
Payer: COMMERCIAL

## 2024-05-28 VITALS — SYSTOLIC BLOOD PRESSURE: 118 MMHG | BODY MASS INDEX: 30.24 KG/M2 | DIASTOLIC BLOOD PRESSURE: 80 MMHG | WEIGHT: 170.7 LBS

## 2024-05-28 DIAGNOSIS — Z01.419 WELL WOMAN EXAM WITH ROUTINE GYNECOLOGICAL EXAM: Primary | ICD-10-CM

## 2024-05-28 PROCEDURE — 99396 PREV VISIT EST AGE 40-64: CPT | Performed by: OBSTETRICS & GYNECOLOGY

## 2024-05-28 PROCEDURE — 3074F SYST BP LT 130 MM HG: CPT | Performed by: OBSTETRICS & GYNECOLOGY

## 2024-05-28 PROCEDURE — 3079F DIAST BP 80-89 MM HG: CPT | Performed by: OBSTETRICS & GYNECOLOGY

## 2024-05-28 RX ORDER — CLOBETASOL PROPIONATE 0.5 MG/G
OINTMENT TOPICAL 2 TIMES DAILY
Qty: 60 G | Refills: 4 | Status: SHIPPED | OUTPATIENT
Start: 2024-05-28

## 2024-05-28 RX ORDER — CLOBETASOL PROPIONATE 0.5 MG/G
OINTMENT TOPICAL 2 TIMES DAILY
COMMUNITY
End: 2024-05-28 | Stop reason: SDUPTHER

## 2024-05-28 RX ORDER — ESTRADIOL 0.1 MG/G
1 CREAM VAGINAL DAILY
Qty: 1 EACH | Refills: 5 | Status: SHIPPED | OUTPATIENT
Start: 2024-05-28

## 2024-05-28 NOTE — PROGRESS NOTES
Subjective:      Patient ID: Rebeka Jeffrey is a 64 y.o. female.    HPI  pts here for annual gyn exam.  She needs refills on estrace and clobetasol.  Just returned from visiting her son in [place].  Up to date with mammogram and colonoscopy.  Denies complaints.    Review of Systems Pertinent review of systems items discussed above.  All others systems items not discussed above were negative.      Objective:   Physical Exam  Constitutional:       Appearance: She is well-developed.   HENT:      Head: Normocephalic and atraumatic.   Neck:      Thyroid: No thyromegaly.      Trachea: No tracheal deviation.   Cardiovascular:      Rate and Rhythm: Normal rate and regular rhythm.      Heart sounds: Normal heart sounds. No murmur heard.  Pulmonary:      Effort: Pulmonary effort is normal. No respiratory distress.      Breath sounds: Normal breath sounds. No wheezing or rales.   Chest:   Breasts:     Right: No mass, nipple discharge or skin change.      Left: No mass, nipple discharge or skin change.   Abdominal:      General: There is no distension.      Palpations: Abdomen is soft. There is no mass.      Tenderness: There is no abdominal tenderness. There is no rebound.   Genitourinary:     Labia:         Right: No lesion.         Left: No lesion.       Vagina: Normal. No foreign body. No vaginal discharge.      Adnexa:         Right: No mass or tenderness.          Left: No mass or tenderness.        Rectum: Normal. Guaiac result negative. No mass or external hemorrhoid.      Comments: Pap performed.   Uterus and cx absent, early vulvar atrophic changes  Musculoskeletal:         General: Normal range of motion.   Lymphadenopathy:      Cervical: No cervical adenopathy.   Neurological:      Mental Status: She is alert and oriented to person, place, and time.         Assessment:   Normal gyn exam     Plan:   Rx estrace, clobetasol.  F/u annual gyn exam.       Rufus Quiros MD

## 2024-05-30 LAB
HPV HR 12 DNA SPEC QL NAA+PROBE: NOT DETECTED
HPV16 DNA SPEC QL NAA+PROBE: NOT DETECTED
HPV16+18+H RISK 12 DNA SPEC-IMP: NORMAL
HPV18 DNA SPEC QL NAA+PROBE: NOT DETECTED

## 2024-07-15 ENCOUNTER — PATIENT MESSAGE (OUTPATIENT)
Dept: FAMILY MEDICINE CLINIC | Age: 64
End: 2024-07-15

## 2024-07-15 RX ORDER — DOXEPIN HYDROCHLORIDE 10 MG/1
10 CAPSULE ORAL NIGHTLY
Qty: 30 CAPSULE | Refills: 1 | Status: SHIPPED | OUTPATIENT
Start: 2024-07-15

## 2024-07-15 NOTE — TELEPHONE ENCOUNTER
From: Rebeka Jeffrey  To: Dr. Corey Lindsey  Sent: 7/15/2024 8:14 AM EDT  Subject: Sleeping     Good morning I have been having a lot of trouble sleeping for awhile now. I stay up till 1030 1100 wake up sometime at 2 or 3 and can not go back to sleep.this is almost every night. My mind won’t stop thinking of stuff. It’s really getting rough on me. Is there anyway that you could call something in to help me? I have a appointment august 15.

## 2024-07-31 ENCOUNTER — TELEPHONE (OUTPATIENT)
Dept: FAMILY MEDICINE CLINIC | Age: 64
End: 2024-07-31

## 2024-07-31 DIAGNOSIS — E78.00 PURE HYPERCHOLESTEROLEMIA: ICD-10-CM

## 2024-07-31 RX ORDER — ATORVASTATIN CALCIUM 40 MG/1
TABLET, FILM COATED ORAL
Qty: 90 TABLET | Refills: 0 | Status: SHIPPED | OUTPATIENT
Start: 2024-07-31

## 2024-08-07 ENCOUNTER — TELEPHONE (OUTPATIENT)
Dept: FAMILY MEDICINE CLINIC | Age: 64
End: 2024-08-07

## 2024-08-07 RX ORDER — GABAPENTIN 300 MG/1
300 CAPSULE ORAL NIGHTLY
Qty: 30 CAPSULE | Refills: 0 | Status: SHIPPED | OUTPATIENT
Start: 2024-08-07 | End: 2024-09-06

## 2024-08-08 DIAGNOSIS — M79.605 PAIN IN BOTH LOWER EXTREMITIES: ICD-10-CM

## 2024-08-08 DIAGNOSIS — M79.604 PAIN IN BOTH LOWER EXTREMITIES: ICD-10-CM

## 2024-08-08 RX ORDER — HYDROCODONE BITARTRATE AND ACETAMINOPHEN 7.5; 325 MG/1; MG/1
1-1.5 TABLET ORAL NIGHTLY
Qty: 35 TABLET | Refills: 0 | Status: SHIPPED | OUTPATIENT
Start: 2024-08-08 | End: 2024-09-07

## 2024-08-08 NOTE — TELEPHONE ENCOUNTER
From: Rebeka Jeffrey  To: Office of Dr. Corey Guzman  Sent: 8/8/2024 11:06 AM EDT  Subject: Medication Renewal Request    Refills have been requested for the following medications:     HYDROcodone-acetaminophen (NORCO) 7.5-325 MG per tablet [Dr. Corey Guzman MD]   Patient Comment: I had an appointment with rosette guzman on the 15 and theycalled and rescheduled with elaina on sept 6    Preferred pharmacy: [facility] [id number] [address] - P [phone] - F [phone]

## 2024-08-20 DIAGNOSIS — M79.604 PAIN IN BOTH LOWER EXTREMITIES: ICD-10-CM

## 2024-08-20 DIAGNOSIS — M79.605 PAIN IN BOTH LOWER EXTREMITIES: ICD-10-CM

## 2024-08-20 RX ORDER — HYDROCODONE BITARTRATE AND ACETAMINOPHEN 7.5; 325 MG/1; MG/1
1-1.5 TABLET ORAL NIGHTLY
Qty: 35 TABLET | Refills: 0 | Status: SHIPPED | OUTPATIENT
Start: 2024-08-20 | End: 2024-09-19

## 2024-08-20 RX ORDER — HYDROCODONE BITARTRATE AND ACETAMINOPHEN 7.5; 325 MG/1; MG/1
1-1.5 TABLET ORAL NIGHTLY
Qty: 35 TABLET | Refills: 0 | OUTPATIENT
Start: 2024-08-20 | End: 2024-09-19

## 2024-09-04 RX ORDER — GABAPENTIN 300 MG/1
300 CAPSULE ORAL NIGHTLY
Qty: 30 CAPSULE | Refills: 0 | Status: SHIPPED | OUTPATIENT
Start: 2024-09-04 | End: 2024-10-04

## 2024-09-10 ENCOUNTER — TELEPHONE (OUTPATIENT)
Dept: ADMINISTRATIVE | Age: 64
End: 2024-09-10

## 2024-09-10 ENCOUNTER — TELEPHONE (OUTPATIENT)
Dept: FAMILY MEDICINE CLINIC | Age: 64
End: 2024-09-10

## 2024-09-10 DIAGNOSIS — E11.65 TYPE 2 DIABETES MELLITUS WITH HYPERGLYCEMIA, WITHOUT LONG-TERM CURRENT USE OF INSULIN (HCC): Primary | ICD-10-CM

## 2024-09-16 ENCOUNTER — OFFICE VISIT (OUTPATIENT)
Dept: FAMILY MEDICINE CLINIC | Age: 64
End: 2024-09-16

## 2024-09-16 VITALS
OXYGEN SATURATION: 99 % | HEIGHT: 63 IN | SYSTOLIC BLOOD PRESSURE: 126 MMHG | BODY MASS INDEX: 29.95 KG/M2 | HEART RATE: 67 BPM | DIASTOLIC BLOOD PRESSURE: 78 MMHG | WEIGHT: 169 LBS

## 2024-09-16 DIAGNOSIS — Z23 NEEDS FLU SHOT: ICD-10-CM

## 2024-09-16 DIAGNOSIS — F41.1 GENERALIZED ANXIETY DISORDER: ICD-10-CM

## 2024-09-16 DIAGNOSIS — M79.604 PAIN IN BOTH LOWER EXTREMITIES: ICD-10-CM

## 2024-09-16 DIAGNOSIS — E11.65 TYPE 2 DIABETES MELLITUS WITH HYPERGLYCEMIA, WITHOUT LONG-TERM CURRENT USE OF INSULIN (HCC): Primary | ICD-10-CM

## 2024-09-16 DIAGNOSIS — E78.00 PURE HYPERCHOLESTEROLEMIA: ICD-10-CM

## 2024-09-16 DIAGNOSIS — R00.2 PALPITATIONS: ICD-10-CM

## 2024-09-16 DIAGNOSIS — M79.605 PAIN IN BOTH LOWER EXTREMITIES: ICD-10-CM

## 2024-09-16 DIAGNOSIS — I10 ESSENTIAL HYPERTENSION: ICD-10-CM

## 2024-09-16 LAB — HBA1C MFR BLD: 5.8 %

## 2024-09-16 RX ORDER — DESVENLAFAXINE 100 MG/1
100 TABLET, EXTENDED RELEASE ORAL NIGHTLY
Qty: 90 TABLET | Refills: 2 | Status: SHIPPED | OUTPATIENT
Start: 2024-09-16

## 2024-09-16 RX ORDER — HYDROCODONE BITARTRATE AND ACETAMINOPHEN 7.5; 325 MG/1; MG/1
1-1.5 TABLET ORAL NIGHTLY
Qty: 40 TABLET | Refills: 0 | Status: SHIPPED | OUTPATIENT
Start: 2024-10-16 | End: 2024-11-12

## 2024-09-16 RX ORDER — HYDROCODONE BITARTRATE AND ACETAMINOPHEN 7.5; 325 MG/1; MG/1
1-1.5 TABLET ORAL NIGHTLY
Qty: 40 TABLET | Refills: 0 | Status: SHIPPED | OUTPATIENT
Start: 2024-09-16 | End: 2024-10-13

## 2024-09-16 RX ORDER — ATORVASTATIN CALCIUM 40 MG/1
TABLET, FILM COATED ORAL
Qty: 90 TABLET | Refills: 2 | Status: SHIPPED | OUTPATIENT
Start: 2024-09-16

## 2024-09-16 RX ORDER — METOPROLOL SUCCINATE 25 MG/1
TABLET, EXTENDED RELEASE ORAL
Qty: 270 TABLET | Refills: 3 | Status: SHIPPED | OUTPATIENT
Start: 2024-09-16

## 2024-09-18 ASSESSMENT — ENCOUNTER SYMPTOMS
SINUS PRESSURE: 0
SORE THROAT: 0
EYE DISCHARGE: 0
SINUS PAIN: 0
DIARRHEA: 0
EYE PAIN: 0
COUGH: 0
SHORTNESS OF BREATH: 0
ABDOMINAL PAIN: 0
ABDOMINAL DISTENTION: 0
WHEEZING: 0
NAUSEA: 0
VOMITING: 0
EYE REDNESS: 0

## 2024-11-12 ENCOUNTER — LAB (OUTPATIENT)
Dept: FAMILY MEDICINE CLINIC | Age: 64
End: 2024-11-12
Payer: COMMERCIAL

## 2024-11-12 DIAGNOSIS — E78.00 PURE HYPERCHOLESTEROLEMIA: ICD-10-CM

## 2024-11-12 DIAGNOSIS — I10 ESSENTIAL HYPERTENSION: ICD-10-CM

## 2024-11-12 DIAGNOSIS — E11.65 TYPE 2 DIABETES MELLITUS WITH HYPERGLYCEMIA, WITHOUT LONG-TERM CURRENT USE OF INSULIN (HCC): ICD-10-CM

## 2024-11-12 LAB
ALBUMIN SERPL-MCNC: 4.1 G/DL (ref 3.4–5)
ALBUMIN/GLOB SERPL: 2.1 {RATIO} (ref 1.1–2.2)
ALP SERPL-CCNC: 57 U/L (ref 40–129)
ALT SERPL-CCNC: 52 U/L (ref 10–40)
ANION GAP SERPL CALCULATED.3IONS-SCNC: 8 MMOL/L (ref 3–16)
AST SERPL-CCNC: 33 U/L (ref 15–37)
BILIRUB SERPL-MCNC: <0.2 MG/DL (ref 0–1)
BUN SERPL-MCNC: 18 MG/DL (ref 7–20)
CALCIUM SERPL-MCNC: 9.5 MG/DL (ref 8.3–10.6)
CHLORIDE SERPL-SCNC: 102 MMOL/L (ref 99–110)
CHOLEST SERPL-MCNC: 159 MG/DL (ref 0–199)
CO2 SERPL-SCNC: 30 MMOL/L (ref 21–32)
CREAT SERPL-MCNC: 0.8 MG/DL (ref 0.6–1.2)
GFR SERPLBLD CREATININE-BSD FMLA CKD-EPI: 82 ML/MIN/{1.73_M2}
GLUCOSE SERPL-MCNC: 94 MG/DL (ref 70–99)
HDLC SERPL-MCNC: 59 MG/DL (ref 40–60)
LDLC SERPL CALC-MCNC: 89 MG/DL
MAGNESIUM SERPL-MCNC: 1.99 MG/DL (ref 1.8–2.4)
POTASSIUM SERPL-SCNC: 4.4 MMOL/L (ref 3.5–5.1)
PROT SERPL-MCNC: 6.1 G/DL (ref 6.4–8.2)
SODIUM SERPL-SCNC: 140 MMOL/L (ref 136–145)
TRIGL SERPL-MCNC: 54 MG/DL (ref 0–150)
TSH SERPL DL<=0.005 MIU/L-ACNC: 3.38 UIU/ML (ref 0.27–4.2)
VLDLC SERPL CALC-MCNC: 11 MG/DL

## 2024-11-12 PROCEDURE — 36415 COLL VENOUS BLD VENIPUNCTURE: CPT | Performed by: NURSE PRACTITIONER

## 2024-11-14 NOTE — PATIENT INSTRUCTIONS
healthy weight. This will lower your risk for many health problems.   Take care of your mental health. Try to stay connected with friends, family, and community, and find ways to manage stress.     If you're feeling depressed or hopeless, talk to someone. A counselor can help. If you don't have a counselor, talk to your doctor.   Talk to your doctor if you think you may have a problem with alcohol or drug use. This includes prescription medicines, marijuana, and other drugs.     Avoid tobacco and nicotine: Don't smoke, vape, or chew. If you need help quitting, talk to your doctor.   Practice safer sex. Getting tested, using condoms or dental dams, and limiting sex partners can help prevent STIs.     Use birth control if it's important to you to prevent pregnancy. Talk with your doctor about your choices and what might be best for you.   Prevent problems where you can. Protect your skin from too much sun, wash your hands, brush your teeth twice a day, and wear a seat belt in the car.   Where can you learn more?  Go to https://www.[employer].net/patientEd and enter P072 to learn more about \"Well Visit, Ages 18 to 65: Care Instructions.\"  Current as of: August 6, 2023  Content Version: 14.2  © 2024 [employer].   Care instructions adapted under license by [employer]. If you have questions about a medical condition or this instruction, always ask your healthcare professional. Healthwise, Incorporated disclaims any warranty or liability for your use of this information.

## 2024-11-15 ENCOUNTER — OFFICE VISIT (OUTPATIENT)
Dept: FAMILY MEDICINE CLINIC | Age: 64
End: 2024-11-15

## 2024-11-15 VITALS
WEIGHT: 171 LBS | HEART RATE: 62 BPM | SYSTOLIC BLOOD PRESSURE: 110 MMHG | OXYGEN SATURATION: 95 % | BODY MASS INDEX: 30.29 KG/M2 | DIASTOLIC BLOOD PRESSURE: 60 MMHG

## 2024-11-15 DIAGNOSIS — R79.89 ELEVATED LFTS: ICD-10-CM

## 2024-11-15 DIAGNOSIS — E11.9 TYPE 2 DIABETES MELLITUS WITHOUT COMPLICATION, WITHOUT LONG-TERM CURRENT USE OF INSULIN (HCC): ICD-10-CM

## 2024-11-15 DIAGNOSIS — M51.362 DEGENERATION OF INTERVERTEBRAL DISC OF LUMBAR REGION WITH DISCOGENIC BACK PAIN AND LOWER EXTREMITY PAIN: ICD-10-CM

## 2024-11-15 DIAGNOSIS — Z00.00 ENCOUNTER FOR WELL ADULT EXAM WITHOUT ABNORMAL FINDINGS: Primary | ICD-10-CM

## 2024-11-15 DIAGNOSIS — Z79.899 LONG-TERM USE OF HIGH-RISK MEDICATION: ICD-10-CM

## 2024-11-15 LAB
ALCOHOL URINE: NORMAL
AMPHETAMINE SCREEN URINE: NORMAL
BARBITURATE SCREEN URINE: NORMAL
BENZODIAZEPINE SCREEN, URINE: NORMAL
BUPRENORPHINE URINE: NORMAL
COCAINE METABOLITE SCREEN URINE: NORMAL
FENTANYL SCREEN, URINE: NORMAL
GABAPENTIN SCREEN, URINE: NORMAL
MDMA, URINE: NORMAL
METHADONE SCREEN, URINE: NORMAL
METHAMPHETAMINE, URINE: NORMAL
OPIATE SCREEN URINE: NORMAL
OXYCODONE SCREEN URINE: NORMAL
PHENCYCLIDINE SCREEN URINE: NORMAL
PROPOXYPHENE SCREEN, URINE: NORMAL
SYNTHETIC CANNABINOIDS(K2) SCREEN, URINE: NORMAL
THC SCREEN, URINE: NORMAL
TRAMADOL SCREEN URINE: NORMAL
TRICYCLIC ANTIDEPRESSANTS, UR: NORMAL

## 2024-11-15 RX ORDER — HYDROCODONE BITARTRATE AND ACETAMINOPHEN 7.5; 325 MG/1; MG/1
1-1.5 TABLET ORAL NIGHTLY
Qty: 40 TABLET | Refills: 0 | Status: SHIPPED | OUTPATIENT
Start: 2024-11-15 | End: 2024-12-15

## 2024-11-15 RX ORDER — HYDROCODONE BITARTRATE AND ACETAMINOPHEN 7.5; 325 MG/1; MG/1
1-1.5 TABLET ORAL NIGHTLY
Qty: 35 TABLET | Refills: 0 | Status: SHIPPED | OUTPATIENT
Start: 2024-12-15 | End: 2025-01-14

## 2024-11-15 RX ORDER — HYDROCODONE BITARTRATE AND ACETAMINOPHEN 7.5; 325 MG/1; MG/1
1-1.5 TABLET ORAL NIGHTLY
Qty: 40 TABLET | Refills: 0 | Status: SHIPPED | OUTPATIENT
Start: 2025-01-14 | End: 2025-02-10

## 2024-11-15 ASSESSMENT — ENCOUNTER SYMPTOMS
COUGH: 0
NAUSEA: 0
COLOR CHANGE: 0
ABDOMINAL PAIN: 0
ABDOMINAL DISTENTION: 0
SORE THROAT: 0
CHEST TIGHTNESS: 0
RHINORRHEA: 0
TROUBLE SWALLOWING: 0
BACK PAIN: 1
VOMITING: 0
DIARRHEA: 0
WHEEZING: 0
PHOTOPHOBIA: 0
CONSTIPATION: 0
SHORTNESS OF BREATH: 0

## 2024-11-15 NOTE — PROGRESS NOTES
scleral icterus.     Extraocular Movements: Extraocular movements intact.      Conjunctiva/sclera: Conjunctivae normal.      Pupils: Pupils are equal, round, and reactive to light.   Neck:      Thyroid: No thyromegaly.      Vascular: No carotid bruit.   Cardiovascular:      Rate and Rhythm: Normal rate and regular rhythm.      Pulses: Normal pulses.      Heart sounds: Normal heart sounds. No murmur heard.     No friction rub. No gallop.   Pulmonary:      Effort: Pulmonary effort is normal. No respiratory distress.      Breath sounds: Normal breath sounds. No stridor. No wheezing, rhonchi or rales.   Chest:      Chest wall: No tenderness.   Abdominal:      General: Bowel sounds are normal. There is no distension.      Palpations: Abdomen is soft.      Tenderness: There is no abdominal tenderness. There is no right CVA tenderness, left CVA tenderness, guarding or rebound.   Genitourinary:     Comments: deferred  Musculoskeletal:         General: Tenderness (Bilateral legs, lumbar spine) present. No swelling, deformity or signs of injury. Normal range of motion.      Cervical back: Normal range of motion and neck supple. No tenderness.      Right lower leg: No edema.      Left lower leg: No edema.   Lymphadenopathy:      Cervical: No cervical adenopathy.   Skin:     General: Skin is warm and dry.      Capillary Refill: Capillary refill takes less than 2 seconds.      Coloration: Skin is not jaundiced or pale.      Findings: No bruising, erythema or rash.   Neurological:      Mental Status: She is alert and oriented to person, place, and time.      Motor: No weakness.      Gait: Gait normal.   Psychiatric:         Attention and Perception: Attention and perception normal.         Mood and Affect: Mood and affect normal. Mood is not anxious or depressed.         Speech: Speech normal.         Behavior: Behavior normal. Behavior is cooperative.         Thought Content: Thought content normal.         Cognition and Memory:

## 2024-11-19 ENCOUNTER — HOSPITAL ENCOUNTER (OUTPATIENT)
Dept: ULTRASOUND IMAGING | Age: 64
Discharge: HOME | End: 2024-11-19
Payer: COMMERCIAL

## 2024-11-19 DIAGNOSIS — R79.89 ELEVATED LFTS: ICD-10-CM

## 2024-11-19 PROCEDURE — 76705 ECHO EXAM OF ABDOMEN: CPT

## 2025-01-15 NOTE — PATIENT INSTRUCTIONS
Approving, but needs appt for additional refills.    Patient Education        High Blood Pressure: Care Instructions  Overview     It's normal for blood pressure to go up and down throughout the day. But if it stays up, you have high blood pressure. Another name for high blood pressure is hypertension. Despite what a lot of people think, high blood pressure usually doesn't cause headaches or make you feel dizzy or lightheaded. It usually has no symptoms. But it does increase your risk of stroke, heart attack, and other problems. You and your doctor will talk about your risks of these problems based on your blood pressure. Your doctor will give you a goal for your blood pressure. Your goal will be based on your health and your age. Lifestyle changes, such as eating healthy and being active, are always important to help lower blood pressure. You might also take medicine to reach your blood pressure goal.  Follow-up care is a key part of your treatment and safety. Be sure to make and go to all appointments, and call your doctor if you are having problems. It's also a good idea to know your test results and keep a list of the medicines you take. How can you care for yourself at home? Medical treatment  · If you stop taking your medicine, your blood pressure will go back up. You may take one or more types of medicine to lower your blood pressure. Be safe with medicines. Take your medicine exactly as prescribed. Call your doctor if you think you are having a problem with your medicine. · Talk to your doctor before you start taking aspirin every day. Aspirin can help certain people lower their risk of a heart attack or stroke. But taking aspirin isn't right for everyone, because it can cause serious bleeding. · See your doctor regularly. You may need to see the doctor more often at first or until your blood pressure comes down.   · If you are taking blood pressure medicine, talk to your doctor before you take decongestants or anti-inflammatory medicine, such as ibuprofen. Some of these medicines can raise blood pressure. · Learn how to check your blood pressure at home. Lifestyle changes  · Stay at a healthy weight. This is especially important if you put on weight around the waist. Losing even 10 pounds can help you lower your blood pressure. · If your doctor recommends it, get more exercise. Walking is a good choice. Bit by bit, increase the amount you walk every day. Try for at least 30 minutes on most days of the week. You also may want to swim, bike, or do other activities. · Avoid or limit alcohol. Talk to your doctor about whether you can drink any alcohol. · Try to limit how much sodium you eat to less than 2,300 milligrams (mg) a day. Your doctor may ask you to try to eat less than 1,500 mg a day. · Eat plenty of fruits (such as bananas and oranges), vegetables, legumes, whole grains, and low-fat dairy products. · Lower the amount of saturated fat in your diet. Saturated fat is found in animal products such as milk, cheese, and meat. Limiting these foods may help you lose weight and also lower your risk for heart disease. · Do not smoke. Smoking increases your risk for heart attack and stroke. If you need help quitting, talk to your doctor about stop-smoking programs and medicines. These can increase your chances of quitting for good. When should you call for help? Call 911  anytime you think you may need emergency care. This may mean having symptoms that suggest that your blood pressure is causing a serious heart or blood vessel problem. Your blood pressure may be over 180/120. For example, call 911 if:    · You have symptoms of a heart attack. These may include:  ? Chest pain or pressure, or a strange feeling in the chest.  ? Sweating. ? Shortness of breath. ? Nausea or vomiting. ? Pain, pressure, or a strange feeling in the back, neck, jaw, or upper belly or in one or both shoulders or arms. ? Lightheadedness or sudden weakness.   ? A fast or irregular heartbeat.     · You have symptoms of a stroke. These may include:  ? Sudden numbness, tingling, weakness, or loss of movement in your face, arm, or leg, especially on only one side of your body. ? Sudden vision changes. ? Sudden trouble speaking. ? Sudden confusion or trouble understanding simple statements. ? Sudden problems with walking or balance. ? A sudden, severe headache that is different from past headaches.     · You have severe back or belly pain. Do not wait until your blood pressure comes down on its own. Get help right away. Call your doctor now or seek immediate care if:    · Your blood pressure is much higher than normal (such as 180/120 or higher), but you don't have symptoms.     · You think high blood pressure is causing symptoms, such as:  ? Severe headache.  ? Blurry vision. Watch closely for changes in your health, and be sure to contact your doctor if:    · Your blood pressure measures higher than your doctor recommends at least 2 times. That means the top number is higher or the bottom number is higher, or both.     · You think you may be having side effects from your blood pressure medicine. Where can you learn more? Go to https://Content Analyticspeephraimeweb.Medical Joyworks. org and sign in to your [employer] account. Enter V508 in the [employer] box to learn more about \"High Blood Pressure: Care Instructions. \"     If you do not have an account, please click on the \"Sign Up Now\" link. Current as of: April 29, 2021               Content Version: 13.1  © 2006-2021 [employer]. Care instructions adapted under license by [facility] ([facility]). If you have questions about a medical condition or this instruction, always ask your healthcare professional. [place] [address] any warranty or liability for your use of this information.          Patient Education        Learning About Diuretics for High Blood Pressure  Overview  Diuretics help to lower blood pressure. This reduces your risk of a heart attack and stroke. It also reduces your risk of kidney disease. Diuretics cause your kidneys to remove sodium and water. They also relax the blood vessel walls. These help lower your blood pressure. Examples  · Chlorthalidone  · Hydrochlorothiazide  Possible side effects  There are some common side effects. They are:  · Too little potassium. · Feeling dizzy. · Rash. · Urinating a lot. · High blood sugar. (But this is not common.)  You may have other side effects. Check the information that comes with your medicine. What to know about taking this medicine  · You may take other medicines for blood pressure. Diuretics can help those work better. They can also prevent extra fluid in your body. · You may need to take potassium pills. Ask your doctor about this. · You may need blood tests to check on your health. For example, you may have tests to check your kidneys and your potassium level. · Take your medicines exactly as prescribed. Call your doctor if you think you are having a problem with your medicine. · Check with your doctor or pharmacist before you use any other medicines. This includes over-the-counter medicines. Make sure your doctor knows all of the medicines, vitamins, herbal products, and supplements you take. Taking some medicines together can cause problems. Where can you learn more? Go to https://Peeppl MediapeStudy2gether.Store Eyes. org and sign in to your [employer] account. Enter E377 in the formerly [facility] box to learn more about \"Learning About Diuretics for High Blood Pressure. \"     If you do not have an account, please click on the \"Sign Up Now\" link. Current as of: April 29, 2021               Content Version: 13.1  © 4012-2670 Healthwise, Incorporated. Care instructions adapted under license by [facility] ([facility]).  If you have questions about a medical condition or this instruction, always ask your healthcare professional. Natalie [employer] Incorporated disclaims any warranty or liability for your use of this information.

## 2025-02-11 ASSESSMENT — PATIENT HEALTH QUESTIONNAIRE - PHQ9
8. MOVING OR SPEAKING SO SLOWLY THAT OTHER PEOPLE COULD HAVE NOTICED. OR THE OPPOSITE - BEING SO FIDGETY OR RESTLESS THAT YOU HAVE BEEN MOVING AROUND A LOT MORE THAN USUAL: NOT AT ALL
1. LITTLE INTEREST OR PLEASURE IN DOING THINGS: NOT AT ALL
9. THOUGHTS THAT YOU WOULD BE BETTER OFF DEAD, OR OF HURTING YOURSELF: NOT AT ALL
6. FEELING BAD ABOUT YOURSELF - OR THAT YOU ARE A FAILURE OR HAVE LET YOURSELF OR YOUR FAMILY DOWN: NOT AT ALL
2. FEELING DOWN, DEPRESSED OR HOPELESS: NOT AT ALL
1. LITTLE INTEREST OR PLEASURE IN DOING THINGS: NOT AT ALL
5. POOR APPETITE OR OVEREATING: NOT AT ALL
7. TROUBLE CONCENTRATING ON THINGS, SUCH AS READING THE NEWSPAPER OR WATCHING TELEVISION: NOT AT ALL
SUM OF ALL RESPONSES TO PHQ QUESTIONS 1-9: 1
SUM OF ALL RESPONSES TO PHQ9 QUESTIONS 1 & 2: 0
10. IF YOU CHECKED OFF ANY PROBLEMS, HOW DIFFICULT HAVE THESE PROBLEMS MADE IT FOR YOU TO DO YOUR WORK, TAKE CARE OF THINGS AT HOME, OR GET ALONG WITH OTHER PEOPLE: NOT DIFFICULT AT ALL
2. FEELING DOWN, DEPRESSED OR HOPELESS: NOT AT ALL
7. TROUBLE CONCENTRATING ON THINGS, SUCH AS READING THE NEWSPAPER OR WATCHING TELEVISION: NOT AT ALL
8. MOVING OR SPEAKING SO SLOWLY THAT OTHER PEOPLE COULD HAVE NOTICED. OR THE OPPOSITE, BEING SO FIGETY OR RESTLESS THAT YOU HAVE BEEN MOVING AROUND A LOT MORE THAN USUAL: NOT AT ALL
SUM OF ALL RESPONSES TO PHQ QUESTIONS 1-9: 1
9. THOUGHTS THAT YOU WOULD BE BETTER OFF DEAD, OR OF HURTING YOURSELF: NOT AT ALL
4. FEELING TIRED OR HAVING LITTLE ENERGY: NOT AT ALL
SUM OF ALL RESPONSES TO PHQ QUESTIONS 1-9: 1
SUM OF ALL RESPONSES TO PHQ QUESTIONS 1-9: 1
3. TROUBLE FALLING OR STAYING ASLEEP: SEVERAL DAYS
4. FEELING TIRED OR HAVING LITTLE ENERGY: NOT AT ALL
SUM OF ALL RESPONSES TO PHQ QUESTIONS 1-9: 1
3. TROUBLE FALLING OR STAYING ASLEEP: SEVERAL DAYS
6. FEELING BAD ABOUT YOURSELF - OR THAT YOU ARE A FAILURE OR HAVE LET YOURSELF OR YOUR FAMILY DOWN: NOT AT ALL
10. IF YOU CHECKED OFF ANY PROBLEMS, HOW DIFFICULT HAVE THESE PROBLEMS MADE IT FOR YOU TO DO YOUR WORK, TAKE CARE OF THINGS AT HOME, OR GET ALONG WITH OTHER PEOPLE: NOT DIFFICULT AT ALL
5. POOR APPETITE OR OVEREATING: NOT AT ALL

## 2025-02-13 NOTE — PATIENT INSTRUCTIONS
To avoid our call center, call our office number [phone], and listen to the options.  After listening to all English options, select the behavioral health option (option 1).  This should get you to our front office.    You may receive a survey regarding the care you received during your visit.  Your input is valuable to us.  We encourage you to complete and return your survey. We hope you will choose us in the future for your healthcare needs.

## 2025-02-14 ENCOUNTER — OFFICE VISIT (OUTPATIENT)
Dept: FAMILY MEDICINE CLINIC | Age: 65
End: 2025-02-14
Payer: COMMERCIAL

## 2025-02-14 VITALS
BODY MASS INDEX: 33.13 KG/M2 | SYSTOLIC BLOOD PRESSURE: 122 MMHG | HEIGHT: 63 IN | DIASTOLIC BLOOD PRESSURE: 72 MMHG | WEIGHT: 187 LBS

## 2025-02-14 DIAGNOSIS — E11.9 TYPE 2 DIABETES MELLITUS WITHOUT COMPLICATION, WITHOUT LONG-TERM CURRENT USE OF INSULIN (HCC): Primary | ICD-10-CM

## 2025-02-14 DIAGNOSIS — M51.362 DEGENERATION OF INTERVERTEBRAL DISC OF LUMBAR REGION WITH DISCOGENIC BACK PAIN AND LOWER EXTREMITY PAIN: ICD-10-CM

## 2025-02-14 DIAGNOSIS — E11.9 TYPE 2 DIABETES MELLITUS WITHOUT COMPLICATION, WITHOUT LONG-TERM CURRENT USE OF INSULIN (HCC): ICD-10-CM

## 2025-02-14 LAB — HBA1C MFR BLD: 5.6 %

## 2025-02-14 PROCEDURE — 3074F SYST BP LT 130 MM HG: CPT

## 2025-02-14 PROCEDURE — 3044F HG A1C LEVEL LT 7.0%: CPT

## 2025-02-14 PROCEDURE — G2211 COMPLEX E/M VISIT ADD ON: HCPCS

## 2025-02-14 PROCEDURE — 83036 HEMOGLOBIN GLYCOSYLATED A1C: CPT

## 2025-02-14 PROCEDURE — 99214 OFFICE O/P EST MOD 30 MIN: CPT

## 2025-02-14 PROCEDURE — 3078F DIAST BP <80 MM HG: CPT

## 2025-02-14 RX ORDER — HYDROCODONE BITARTRATE AND ACETAMINOPHEN 7.5; 325 MG/1; MG/1
1-1.5 TABLET ORAL NIGHTLY
Qty: 40 TABLET | Refills: 0 | Status: SHIPPED | OUTPATIENT
Start: 2025-02-14 | End: 2025-03-13

## 2025-02-14 RX ORDER — HYDROCODONE BITARTRATE AND ACETAMINOPHEN 7.5; 325 MG/1; MG/1
1-1.5 TABLET ORAL NIGHTLY
Qty: 40 TABLET | Refills: 0 | Status: SHIPPED | OUTPATIENT
Start: 2025-04-15 | End: 2025-05-15

## 2025-02-14 RX ORDER — HYDROCODONE BITARTRATE AND ACETAMINOPHEN 7.5; 325 MG/1; MG/1
1-1.5 TABLET ORAL NIGHTLY
Qty: 40 TABLET | Refills: 0 | Status: SHIPPED | OUTPATIENT
Start: 2025-04-15 | End: 2025-02-14 | Stop reason: SDUPTHER

## 2025-02-14 RX ORDER — HYDROCODONE BITARTRATE AND ACETAMINOPHEN 7.5; 325 MG/1; MG/1
1-1.5 TABLET ORAL NIGHTLY
Qty: 40 TABLET | Refills: 0 | Status: SHIPPED | OUTPATIENT
Start: 2025-03-16 | End: 2025-04-15

## 2025-02-14 SDOH — ECONOMIC STABILITY: FOOD INSECURITY: WITHIN THE PAST 12 MONTHS, YOU WORRIED THAT YOUR FOOD WOULD RUN OUT BEFORE YOU GOT MONEY TO BUY MORE.: NEVER TRUE

## 2025-02-14 SDOH — ECONOMIC STABILITY: FOOD INSECURITY: WITHIN THE PAST 12 MONTHS, THE FOOD YOU BOUGHT JUST DIDN'T LAST AND YOU DIDN'T HAVE MONEY TO GET MORE.: NEVER TRUE

## 2025-02-14 ASSESSMENT — ENCOUNTER SYMPTOMS
DIARRHEA: 0
ABDOMINAL PAIN: 0
NAUSEA: 0
WHEEZING: 0
SHORTNESS OF BREATH: 0
BACK PAIN: 1
ABDOMINAL DISTENTION: 0
COUGH: 0
CONSTIPATION: 0
PHOTOPHOBIA: 0
VOMITING: 0
COLOR CHANGE: 0
CHEST TIGHTNESS: 0

## 2025-02-14 NOTE — TELEPHONE ENCOUNTER
Patient was calling to see if we were going to call in her meds. She called the pharmacy and they didn't have them ready.     Hydrocodone 7.5-325, 1x daily, 40 pills a month 3 months     [facility]   Phone number         Mounjaro  2.5MG, 1x weekly    [facility]   Phone number     Can we please give her a call

## 2025-02-14 NOTE — PROGRESS NOTES
Rebeka Jeffrey (:  1960) is a 64 y.o. female,Established patient, here for evaluation of the following chief complaint(s):  Diabetes (Follow up for DM, A1C MICRO DUE ) and Discuss Medications (Discuss Ozempic med )         Assessment & Plan  Type 2 diabetes mellitus without complication, without long-term current use of insulin (HCC)   Chronic, at goal (stable),  no longer having benefit of appetite suppression from Ozempic  -Will begin taper to Mounjaro  -Treatment options discussed.  Mounjaro 5 mg weekly is being sent to the pharmacy.   The medication uses and side effects were discussed with the patient.  Patient verbalized understanding and agrees to the plan.  -After 1 month, increase to 7.5 mg weekly  -Limit carbs in diet: Sweets, breads, rice, 4P's (potatoes, pizza, pasta, pop). Limit carbohydrates to 200 g daily, 15 to 20 g per snack, 45 to 60 g per meal; Drink at least 64 ounces of water a day, engage in cardiac exercise at least 150 minutes/week, walk more than 7,000 steps daily, sleep between 7 to 9 hours nightly, consume approximately 2,000 calories daily, consume all calories within a 10-hour window daily, try not to eat within 1 to 3 hours of bedtime or within 1 to 3 hours of waking up, limit fatty, fried, and ultra-processed foods in the diet, and limit toxins in the diet (alcohol, drugs, smoking). Consider carb counting.  -Emphasis on less sweets, more intentional/exertional physical activity  -Follow-up in 3 months    Diabetic foot exam:   Left Foot:   Visual Exam: normal   Pulse DP: 2+ (normal)   Filament test: 6/6     Right Foot:   Visual Exam: normal   Pulse DP: 2+ (normal)   Filament test: 6/6       Orders:     DIABETES FOOT EXAM    Albumin/Creatinine Ratio, Urine; Future    POCT glycosylated hemoglobin (Hb A1C)    Albumin/Creatinine Ratio, Urine    Tirzepatide 5 MG/0.5ML SOAJ; Inject 5 mg into the skin every 7 days    Degeneration of intervertebral disc of lumbar region with

## 2025-02-15 LAB
CREAT UR-MCNC: 164 MG/DL (ref 28–259)
MICROALBUMIN UR DL<=1MG/L-MCNC: <1.2 MG/DL
MICROALBUMIN/CREAT UR: NORMAL MG/G (ref 0–30)

## 2025-02-17 ENCOUNTER — TELEPHONE (OUTPATIENT)
Dept: ADMINISTRATIVE | Age: 65
End: 2025-02-17

## 2025-02-17 NOTE — TELEPHONE ENCOUNTER
Thank you for creating a prior authorization request using PromptPA.   The prior authorization department will contact you if additional information is needed. Once a decision is made you will be notified of the decision.  You can check the status of your request using the Check Status link. Please note down (Prior Auth EOC ID) to check status.      Prior Authorization request details:   Prior Auth (EOC) ID: [id number] Drug/Service Name: MOUNJARO 5 MG/0.5 ML PEN   Patient: GOLDEN CRUZ Date Requested: 2025 3:16:53 PM     MemberID: [id number] : 1960

## 2025-02-18 ENCOUNTER — PATIENT MESSAGE (OUTPATIENT)
Dept: FAMILY MEDICINE CLINIC | Age: 65
End: 2025-02-18

## 2025-02-20 NOTE — TELEPHONE ENCOUNTER
The medication is APPROVED THROUGH 02/18/2026.    If this requires a response please respond to the pool ( P [facility] MEDICATION PRE-AUTH).      Thank you please advise patient.

## 2025-03-29 DIAGNOSIS — E11.9 TYPE 2 DIABETES MELLITUS WITHOUT COMPLICATION, WITHOUT LONG-TERM CURRENT USE OF INSULIN: ICD-10-CM

## 2025-03-31 ENCOUNTER — PATIENT MESSAGE (OUTPATIENT)
Dept: FAMILY MEDICINE CLINIC | Age: 65
End: 2025-03-31

## 2025-03-31 DIAGNOSIS — E11.9 TYPE 2 DIABETES MELLITUS WITHOUT COMPLICATION, WITHOUT LONG-TERM CURRENT USE OF INSULIN: ICD-10-CM

## 2025-04-01 ENCOUNTER — RESULTS FOLLOW-UP (OUTPATIENT)
Dept: FAMILY MEDICINE CLINIC | Age: 65
End: 2025-04-01

## 2025-04-01 ENCOUNTER — OFFICE VISIT (OUTPATIENT)
Dept: FAMILY MEDICINE CLINIC | Age: 65
End: 2025-04-01
Payer: COMMERCIAL

## 2025-04-01 VITALS
WEIGHT: 190 LBS | HEIGHT: 63 IN | BODY MASS INDEX: 33.66 KG/M2 | SYSTOLIC BLOOD PRESSURE: 142 MMHG | DIASTOLIC BLOOD PRESSURE: 78 MMHG

## 2025-04-01 DIAGNOSIS — M54.50 ACUTE LEFT-SIDED LOW BACK PAIN WITHOUT SCIATICA: ICD-10-CM

## 2025-04-01 DIAGNOSIS — R35.0 URINATION FREQUENCY: Primary | ICD-10-CM

## 2025-04-01 LAB
BILIRUBIN, POC: ABNORMAL
BLOOD URINE, POC: ABNORMAL
CLARITY, POC: ABNORMAL
COLOR, POC: ABNORMAL
GLUCOSE URINE, POC: ABNORMAL
KETONES, POC: ABNORMAL
LEUKOCYTE EST, POC: ABNORMAL
NITRITE, POC: ABNORMAL
PH, POC: ABNORMAL
PROTEIN, POC: ABNORMAL MG/DL
SPECIFIC GRAVITY, POC: ABNORMAL
UROBILINOGEN, POC: ABNORMAL

## 2025-04-01 PROCEDURE — 81002 URINALYSIS NONAUTO W/O SCOPE: CPT

## 2025-04-01 PROCEDURE — 99213 OFFICE O/P EST LOW 20 MIN: CPT

## 2025-04-01 PROCEDURE — 3077F SYST BP >= 140 MM HG: CPT

## 2025-04-01 PROCEDURE — 3078F DIAST BP <80 MM HG: CPT

## 2025-04-01 RX ORDER — NITROFURANTOIN 25; 75 MG/1; MG/1
100 CAPSULE ORAL 2 TIMES DAILY
Qty: 20 CAPSULE | Refills: 0 | Status: SHIPPED | OUTPATIENT
Start: 2025-04-01 | End: 2025-04-11

## 2025-04-01 RX ORDER — METHOCARBAMOL 750 MG/1
750 TABLET, FILM COATED ORAL 4 TIMES DAILY
Qty: 40 TABLET | Refills: 0 | Status: SHIPPED | OUTPATIENT
Start: 2025-04-01 | End: 2025-04-11

## 2025-04-01 ASSESSMENT — ENCOUNTER SYMPTOMS
COLOR CHANGE: 0
TROUBLE SWALLOWING: 0
SHORTNESS OF BREATH: 0
VOMITING: 0
BACK PAIN: 1
BLOOD IN STOOL: 0
COUGH: 0
ABDOMINAL DISTENTION: 0
ANAL BLEEDING: 0
CHEST TIGHTNESS: 0
CONSTIPATION: 0
RECTAL PAIN: 0
ABDOMINAL PAIN: 0
NAUSEA: 0
DIARRHEA: 0
SORE THROAT: 0

## 2025-04-01 NOTE — PATIENT INSTRUCTIONS
You may receive a survey regarding the care you received during your visit.  Your input is valuable to us.  We encourage you to complete and return your survey. We hope you will choose us in the future for your healthcare needs.

## 2025-04-01 NOTE — PROGRESS NOTES
Rebeka Jeffrey (:  1960) is a 64 y.o. female,Established patient, here for evaluation of the following chief complaint(s):  Pain (Pt C/O Lower Back pain off and on x 2 week, urination frequency x 1 day )      Assessment & Plan  Urination frequency   Acute condition, new,  urinalysis completed  -Urine dipstick shows positive for leukocytes, protein.  -Concern for possible UTI  -Will send urine for culture  -Treatment options discussed.  Macrobid is being sent to the pharmacy.   The medication uses and side effects were discussed with the patient.  Patient verbalized understanding and agrees to the plan.  -Increase hydration, consider cranberry juice versus tablets  -Follow-up based on result  Orders:    POCT Urinalysis no Micro    Culture, Urine; Future    nitrofurantoin, macrocrystal-monohydrate, (MACROBID) 100 MG capsule; Take 1 capsule by mouth 2 times daily for 10 days    Acute left-sided low back pain without sciatica   Acute condition, new, concern for low back strain  -Encourage stretching, RICE, heat  -Can continue to use OTCs  -Treatment options discussed.  Robaxin is being sent to the pharmacy.   The medication uses and side effects were discussed with the patient.  Patient verbalized understanding and agrees to the plan.  -Encourage caution with other sedating medications  -Follow-up if no improvement, can consider steroids  Orders:    methocarbamol (ROBAXIN-750) 750 MG tablet; Take 1 tablet by mouth 4 times daily for 10 days      Return if symptoms worsen or fail to improve.    Subjective       HPI  -lifted a 40 lb container of alex litter out of her cart, plus 40 count water bottles  -1-2 days later lower back worse  -yesterday better  -worse every night, severe pain  -has been taking lots of ibuprofen and tylenol plus baseline pain meds, pain ongoing  -last night went to the bathroom a lot last night  -ongoing x2 weeks  -not able to reproduce pain w/ ptatioopns, reaching  -pain across back

## 2025-04-02 LAB — BACTERIA UR CULT: NORMAL

## 2025-04-14 ENCOUNTER — PATIENT MESSAGE (OUTPATIENT)
Dept: FAMILY MEDICINE CLINIC | Age: 65
End: 2025-04-14

## 2025-04-22 DIAGNOSIS — I10 ESSENTIAL HYPERTENSION: ICD-10-CM

## 2025-04-22 DIAGNOSIS — F41.1 GENERALIZED ANXIETY DISORDER: ICD-10-CM

## 2025-04-22 DIAGNOSIS — R00.2 PALPITATIONS: ICD-10-CM

## 2025-04-23 RX ORDER — METOPROLOL SUCCINATE 25 MG/1
TABLET, EXTENDED RELEASE ORAL
Qty: 270 TABLET | Refills: 3 | OUTPATIENT
Start: 2025-04-23

## 2025-04-23 RX ORDER — DESVENLAFAXINE 100 MG/1
100 TABLET, EXTENDED RELEASE ORAL NIGHTLY
Qty: 90 TABLET | Refills: 0 | Status: SHIPPED | OUTPATIENT
Start: 2025-04-23

## 2025-05-19 ENCOUNTER — OFFICE VISIT (OUTPATIENT)
Dept: FAMILY MEDICINE CLINIC | Age: 65
End: 2025-05-19
Payer: COMMERCIAL

## 2025-05-19 VITALS
SYSTOLIC BLOOD PRESSURE: 120 MMHG | DIASTOLIC BLOOD PRESSURE: 60 MMHG | OXYGEN SATURATION: 96 % | WEIGHT: 190 LBS | HEIGHT: 63 IN | BODY MASS INDEX: 33.66 KG/M2 | HEART RATE: 54 BPM

## 2025-05-19 DIAGNOSIS — E78.00 PURE HYPERCHOLESTEROLEMIA: ICD-10-CM

## 2025-05-19 DIAGNOSIS — G47.9 SLEEP DISTURBANCE: ICD-10-CM

## 2025-05-19 DIAGNOSIS — I10 ESSENTIAL HYPERTENSION: ICD-10-CM

## 2025-05-19 DIAGNOSIS — F51.04 PSYCHOPHYSIOLOGIC INSOMNIA: ICD-10-CM

## 2025-05-19 DIAGNOSIS — R00.2 PALPITATIONS: ICD-10-CM

## 2025-05-19 DIAGNOSIS — M51.362 DEGENERATION OF INTERVERTEBRAL DISC OF LUMBAR REGION WITH DISCOGENIC BACK PAIN AND LOWER EXTREMITY PAIN: ICD-10-CM

## 2025-05-19 DIAGNOSIS — E11.9 TYPE 2 DIABETES MELLITUS WITHOUT COMPLICATION, WITHOUT LONG-TERM CURRENT USE OF INSULIN (HCC): Primary | ICD-10-CM

## 2025-05-19 DIAGNOSIS — F41.1 GENERALIZED ANXIETY DISORDER: ICD-10-CM

## 2025-05-19 LAB — HBA1C MFR BLD: 5.3 %

## 2025-05-19 PROCEDURE — 99214 OFFICE O/P EST MOD 30 MIN: CPT | Performed by: NURSE PRACTITIONER

## 2025-05-19 PROCEDURE — 1123F ACP DISCUSS/DSCN MKR DOCD: CPT | Performed by: NURSE PRACTITIONER

## 2025-05-19 PROCEDURE — G2211 COMPLEX E/M VISIT ADD ON: HCPCS | Performed by: NURSE PRACTITIONER

## 2025-05-19 PROCEDURE — 3078F DIAST BP <80 MM HG: CPT | Performed by: NURSE PRACTITIONER

## 2025-05-19 PROCEDURE — 3074F SYST BP LT 130 MM HG: CPT | Performed by: NURSE PRACTITIONER

## 2025-05-19 PROCEDURE — 3044F HG A1C LEVEL LT 7.0%: CPT | Performed by: NURSE PRACTITIONER

## 2025-05-19 PROCEDURE — 83036 HEMOGLOBIN GLYCOSYLATED A1C: CPT | Performed by: NURSE PRACTITIONER

## 2025-05-19 RX ORDER — HYDROXYZINE HYDROCHLORIDE 25 MG/1
25-50 TABLET, FILM COATED ORAL NIGHTLY
Qty: 180 TABLET | Refills: 0 | Status: SHIPPED | OUTPATIENT
Start: 2025-05-19 | End: 2025-08-17

## 2025-05-19 RX ORDER — ATORVASTATIN CALCIUM 40 MG/1
TABLET, FILM COATED ORAL
Qty: 90 TABLET | Refills: 3 | Status: SHIPPED | OUTPATIENT
Start: 2025-05-19

## 2025-05-19 RX ORDER — METOPROLOL SUCCINATE 25 MG/1
TABLET, EXTENDED RELEASE ORAL
Qty: 270 TABLET | Refills: 3 | Status: SHIPPED | OUTPATIENT
Start: 2025-05-19

## 2025-05-19 RX ORDER — DESVENLAFAXINE 100 MG/1
100 TABLET, EXTENDED RELEASE ORAL NIGHTLY
Qty: 90 TABLET | Refills: 3 | Status: SHIPPED | OUTPATIENT
Start: 2025-05-19

## 2025-05-19 RX ORDER — HYDROCODONE BITARTRATE AND ACETAMINOPHEN 7.5; 325 MG/1; MG/1
1-1.5 TABLET ORAL NIGHTLY
Qty: 40 TABLET | Refills: 0 | Status: SHIPPED | OUTPATIENT
Start: 2025-05-19 | End: 2025-06-18

## 2025-05-19 RX ORDER — HYDROCODONE BITARTRATE AND ACETAMINOPHEN 7.5; 325 MG/1; MG/1
1-1.5 TABLET ORAL NIGHTLY
Qty: 40 TABLET | Refills: 0 | Status: SHIPPED | OUTPATIENT
Start: 2025-06-18 | End: 2025-07-18

## 2025-05-19 RX ORDER — HYDROCODONE BITARTRATE AND ACETAMINOPHEN 7.5; 325 MG/1; MG/1
1-1.5 TABLET ORAL NIGHTLY
Qty: 40 TABLET | Refills: 0 | Status: SHIPPED | OUTPATIENT
Start: 2025-07-18 | End: 2025-08-17

## 2025-05-19 ASSESSMENT — ENCOUNTER SYMPTOMS
EYE REDNESS: 0
ABDOMINAL DISTENTION: 0
EYE DISCHARGE: 0
SINUS PAIN: 0
VOMITING: 0
WHEEZING: 0
NAUSEA: 0
SINUS PRESSURE: 0
SORE THROAT: 0
EYE PAIN: 0
COUGH: 0
ABDOMINAL PAIN: 0
DIARRHEA: 0
SHORTNESS OF BREATH: 0

## 2025-05-19 NOTE — ASSESSMENT & PLAN NOTE
Controlled.  Continue metoprolol.  Now following with cardiology.  Just had Holter monitor which was reviewed.  Asymptomatic bradycardia.  Follow-up in 3 months, or sooner if needed.    Orders:    metoprolol succinate (TOPROL XL) 25 MG extended release tablet; TAKE ONE TABLET BY MOUTH EVERY MORNING AND TAKE ONE TO TWO TABLETS BY MOUTH EVERY EVENING AS DIRECTED

## 2025-05-19 NOTE — ASSESSMENT & PLAN NOTE
Controlled.  Continue atorvastatin.  Follow-up in 3 months, or sooner if needed.    Orders:    atorvastatin (LIPITOR) 40 MG tablet; 1/2 to 1  po daily

## 2025-05-19 NOTE — PROGRESS NOTES
Rebeka Jeffrey (:  1960) is a 65 y.o. female,Established patient, here for evaluation of the following chief complaint(s):  Diabetes (Follow up on diabetes, A1c due today ), Chronic Pain (Follow up on chronic pain, needs med refills, mc and uds up to date ), and Sleep Problem (Pt wants to discuss her sleeping issues)      Assessment & Plan  Type 2 diabetes mellitus without complication, without long-term current use of insulin (HCC)  Controlled.  A1c 5.3% today.  However, patient is plateauing with weight loss.  Increase Mounjaro to 10 mg weekly.  Follow-up in 3 months as already scheduled.  Encouraged diabetic eye exam.    Orders:    POCT glycosylated hemoglobin (Hb A1C)    Tirzepatide (MOUNJARO) 10 MG/0.5ML SOAJ pen; Inject 10 mg into the skin every 7 days    Degeneration of intervertebral disc of lumbar region with discogenic back pain and lower extremity pain    Controlled.  Continue Norco.  OARRS reviewed.  UDS and med contract up-to-date.  Follow-up in 3 months, or sooner if needed.    Orders:    HYDROcodone-acetaminophen (NORCO) 7.5-325 MG per tablet; Take 1-1.5 tablets by mouth nightly for 30 days. Max Daily Amount: 1.5 tablets    HYDROcodone-acetaminophen (NORCO) 7.5-325 MG per tablet; Take 1-1.5 tablets by mouth nightly for 30 days. Fill  or later Max Daily Amount: 1.5 tablets    HYDROcodone-acetaminophen (NORCO) 7.5-325 MG per tablet; Take 1-1.5 tablets by mouth nightly for 30 days. Fill  Max Daily Amount: 1.5 tablets    Generalized anxiety disorder    Controlled.  Continue Pristiq.  Follow-up in 3 months, or sooner if needed.    Orders:    desvenlafaxine succinate (PRISTIQ) 100 MG TB24 extended release tablet; Take 1 tablet by mouth nightly    Essential hypertension    Controlled.  Continue metoprolol.  Now following with cardiology.  Just had Holter monitor which was reviewed.  Asymptomatic bradycardia.  Follow-up in 3 months, or sooner if needed.    Orders:    metoprolol succinate

## 2025-05-19 NOTE — ASSESSMENT & PLAN NOTE
Controlled.  Just had Holter monitor which was fairly unremarkable.  Continue with metoprolol and following with cardiology.  Follow-up in 3 months.    Orders:    metoprolol succinate (TOPROL XL) 25 MG extended release tablet; TAKE ONE TABLET BY MOUTH EVERY MORNING AND TAKE ONE TO TWO TABLETS BY MOUTH EVERY EVENING AS DIRECTED

## 2025-05-19 NOTE — ASSESSMENT & PLAN NOTE
Controlled.  Continue Pristiq.  Follow-up in 3 months, or sooner if needed.    Orders:    desvenlafaxine succinate (PRISTIQ) 100 MG TB24 extended release tablet; Take 1 tablet by mouth nightly

## 2025-06-26 DIAGNOSIS — E11.9 TYPE 2 DIABETES MELLITUS WITHOUT COMPLICATION, WITHOUT LONG-TERM CURRENT USE OF INSULIN (HCC): ICD-10-CM

## 2025-08-05 DIAGNOSIS — F51.04 PSYCHOPHYSIOLOGIC INSOMNIA: ICD-10-CM

## 2025-08-06 RX ORDER — HYDROXYZINE HYDROCHLORIDE 25 MG/1
TABLET, FILM COATED ORAL
Qty: 180 TABLET | Refills: 0 | Status: SHIPPED | OUTPATIENT
Start: 2025-08-06

## 2025-08-18 ENCOUNTER — OFFICE VISIT (OUTPATIENT)
Dept: FAMILY MEDICINE CLINIC | Age: 65
End: 2025-08-18
Payer: COMMERCIAL

## 2025-08-18 ENCOUNTER — HOSPITAL ENCOUNTER (OUTPATIENT)
Dept: MAMMOGRAPHY | Age: 65
Discharge: HOME | End: 2025-08-23
Payer: COMMERCIAL

## 2025-08-18 VITALS — BODY MASS INDEX: 34.2 KG/M2 | WEIGHT: 193 LBS | HEIGHT: 63 IN

## 2025-08-18 VITALS
WEIGHT: 193 LBS | HEART RATE: 74 BPM | BODY MASS INDEX: 34.2 KG/M2 | DIASTOLIC BLOOD PRESSURE: 62 MMHG | OXYGEN SATURATION: 94 % | HEIGHT: 63 IN | SYSTOLIC BLOOD PRESSURE: 110 MMHG

## 2025-08-18 DIAGNOSIS — E78.00 PURE HYPERCHOLESTEROLEMIA: ICD-10-CM

## 2025-08-18 DIAGNOSIS — G47.9 SLEEP DISTURBANCE: ICD-10-CM

## 2025-08-18 DIAGNOSIS — Z12.31 VISIT FOR SCREENING MAMMOGRAM: ICD-10-CM

## 2025-08-18 DIAGNOSIS — M79.604 PAIN IN BOTH LOWER EXTREMITIES: ICD-10-CM

## 2025-08-18 DIAGNOSIS — M79.605 PAIN IN BOTH LOWER EXTREMITIES: ICD-10-CM

## 2025-08-18 DIAGNOSIS — E11.9 TYPE 2 DIABETES MELLITUS WITHOUT COMPLICATION, WITHOUT LONG-TERM CURRENT USE OF INSULIN (HCC): Primary | ICD-10-CM

## 2025-08-18 DIAGNOSIS — R22.42 LOCALIZED SWELLING OF LEFT FOOT: ICD-10-CM

## 2025-08-18 DIAGNOSIS — R00.2 PALPITATIONS: ICD-10-CM

## 2025-08-18 DIAGNOSIS — M51.362 DEGENERATION OF INTERVERTEBRAL DISC OF LUMBAR REGION WITH DISCOGENIC BACK PAIN AND LOWER EXTREMITY PAIN: ICD-10-CM

## 2025-08-18 LAB — HBA1C MFR BLD: 5.6 %

## 2025-08-18 PROCEDURE — 3044F HG A1C LEVEL LT 7.0%: CPT | Performed by: NURSE PRACTITIONER

## 2025-08-18 PROCEDURE — 3078F DIAST BP <80 MM HG: CPT | Performed by: NURSE PRACTITIONER

## 2025-08-18 PROCEDURE — 99214 OFFICE O/P EST MOD 30 MIN: CPT | Performed by: NURSE PRACTITIONER

## 2025-08-18 PROCEDURE — G2211 COMPLEX E/M VISIT ADD ON: HCPCS | Performed by: NURSE PRACTITIONER

## 2025-08-18 PROCEDURE — 77063 BREAST TOMOSYNTHESIS BI: CPT

## 2025-08-18 PROCEDURE — 3074F SYST BP LT 130 MM HG: CPT | Performed by: NURSE PRACTITIONER

## 2025-08-18 PROCEDURE — 1123F ACP DISCUSS/DSCN MKR DOCD: CPT | Performed by: NURSE PRACTITIONER

## 2025-08-18 PROCEDURE — 83036 HEMOGLOBIN GLYCOSYLATED A1C: CPT | Performed by: NURSE PRACTITIONER

## 2025-08-18 RX ORDER — HYDROCODONE BITARTRATE AND ACETAMINOPHEN 7.5; 325 MG/1; MG/1
1-1.5 TABLET ORAL NIGHTLY
Qty: 40 TABLET | Refills: 0 | Status: SHIPPED | OUTPATIENT
Start: 2025-11-16 | End: 2025-12-16

## 2025-08-18 RX ORDER — HYDROCODONE BITARTRATE AND ACETAMINOPHEN 7.5; 325 MG/1; MG/1
1-1.5 TABLET ORAL NIGHTLY
Qty: 40 TABLET | Refills: 0 | Status: SHIPPED | OUTPATIENT
Start: 2025-09-17 | End: 2025-10-17

## 2025-08-18 RX ORDER — HYDROCODONE BITARTRATE AND ACETAMINOPHEN 7.5; 325 MG/1; MG/1
1-1.5 TABLET ORAL NIGHTLY
Qty: 40 TABLET | Refills: 0 | Status: SHIPPED | OUTPATIENT
Start: 2025-10-17 | End: 2025-11-16

## 2025-08-18 RX ORDER — HYDROCODONE BITARTRATE AND ACETAMINOPHEN 7.5; 325 MG/1; MG/1
1-1.5 TABLET ORAL NIGHTLY
Qty: 40 TABLET | Refills: 0 | Status: SHIPPED | OUTPATIENT
Start: 2025-08-18 | End: 2025-09-17

## 2025-08-18 ASSESSMENT — ENCOUNTER SYMPTOMS
SINUS PAIN: 0
ABDOMINAL PAIN: 0
WHEEZING: 0
EYE DISCHARGE: 0
EYE PAIN: 0
EYE REDNESS: 0
COUGH: 0
NAUSEA: 0
SINUS PRESSURE: 0
BACK PAIN: 1
ABDOMINAL DISTENTION: 0
DIARRHEA: 0
SORE THROAT: 0
VOMITING: 0
SHORTNESS OF BREATH: 0